# Patient Record
Sex: FEMALE | Race: WHITE | NOT HISPANIC OR LATINO | Employment: OTHER | URBAN - METROPOLITAN AREA
[De-identification: names, ages, dates, MRNs, and addresses within clinical notes are randomized per-mention and may not be internally consistent; named-entity substitution may affect disease eponyms.]

---

## 2017-01-05 ENCOUNTER — OFFICE VISIT (OUTPATIENT)
Dept: INTERNAL MEDICINE | Facility: CLINIC | Age: 67
End: 2017-01-05
Payer: MEDICARE

## 2017-01-05 ENCOUNTER — TELEPHONE (OUTPATIENT)
Dept: INTERNAL MEDICINE | Facility: CLINIC | Age: 67
End: 2017-01-05

## 2017-01-05 VITALS
WEIGHT: 175.25 LBS | BODY MASS INDEX: 29.92 KG/M2 | HEART RATE: 70 BPM | SYSTOLIC BLOOD PRESSURE: 120 MMHG | HEIGHT: 64 IN | DIASTOLIC BLOOD PRESSURE: 66 MMHG

## 2017-01-05 DIAGNOSIS — Z23 NEED FOR VACCINATION WITH 13-POLYVALENT PNEUMOCOCCAL CONJUGATE VACCINE: ICD-10-CM

## 2017-01-05 DIAGNOSIS — R42 LOSS OF EQUILIBRIUM: ICD-10-CM

## 2017-01-05 DIAGNOSIS — M81.0 OSTEOPOROSIS: ICD-10-CM

## 2017-01-05 DIAGNOSIS — E66.9 OBESITY (BMI 30.0-34.9): ICD-10-CM

## 2017-01-05 DIAGNOSIS — Z12.31 ENCOUNTER FOR SCREENING MAMMOGRAM FOR BREAST CANCER: ICD-10-CM

## 2017-01-05 DIAGNOSIS — Z71.84 TRAVEL ADVICE ENCOUNTER: ICD-10-CM

## 2017-01-05 DIAGNOSIS — E11.9 TYPE 2 DIABETES MELLITUS WITHOUT RETINOPATHY: Primary | ICD-10-CM

## 2017-01-05 DIAGNOSIS — Z23 NEED FOR TDAP VACCINATION: ICD-10-CM

## 2017-01-05 PROCEDURE — 99213 OFFICE O/P EST LOW 20 MIN: CPT | Mod: PBBFAC | Performed by: INTERNAL MEDICINE

## 2017-01-05 PROCEDURE — 99204 OFFICE O/P NEW MOD 45 MIN: CPT | Mod: S$PBB,,, | Performed by: INTERNAL MEDICINE

## 2017-01-05 PROCEDURE — 90670 PCV13 VACCINE IM: CPT | Mod: PBBFAC | Performed by: INTERNAL MEDICINE

## 2017-01-05 PROCEDURE — 99999 PR PBB SHADOW E&M-EST. PATIENT-LVL III: CPT | Mod: PBBFAC,,, | Performed by: INTERNAL MEDICINE

## 2017-01-05 NOTE — MR AVS SNAPSHOT
[facility]  [address]  [facility] [address]  Phone: [phone]  Fax: [phone]                  Linda Fong   2017 10:40 AM   Office Visit    Description:  Female : 1950   Provider:  Manuel Yanes MD   Department:  [facility]           Reason for Visit     Establish Care           Diagnoses this Visit        Comments    Need for Tdap vaccination    -  Primary     Need for vaccination with 13-polyvalent pneumococcal conjugate vaccine         Osteoporosis         Encounter for screening mammogram for breast cancer         Obesity (BMI 30.0-34.9)         Type 2 diabetes mellitus without retinopathy                To Do List           Future Appointments        Provider Department Dept Phone    2/10/2017 2:00 PM LOVE Bautista,ANP-[facility] [phone]      Goals (5 Years of Data)     None      Follow-Up and Disposition     Return in about 6 months (around 2017).       These Medications        Disp Refills Start End    diphth,pertus,acell,,tetanus (BOOSTRIX) 2.5-8-5 Lf-mcg-Lf/0.5mL Susp 0.5 mL 0 2017    Inject 0.5 mLs into the muscle once. - Intramuscular    Pharmacy: [facility] - [address] #: [phone]         [facility] On Call     [facility] On Call Nurse Care Line - / Assistance  Registered nurses in the [facility] On Call Center provide clinical advisement, health education, appointment booking, and other advisory services.  Call for this free service at [phone].             Medications           Message regarding Medications     Verify the changes and/or additions to your medication regime listed below are the same as discussed with your clinician today.  If any of these changes or additions are incorrect, please notify your healthcare provider.        START taking these NEW medications        Refills    diphth,pertus,acell,,tetanus  "(BOOSTRIX) 2.5-8-5 Lf-mcg-Lf/0.5mL Susp 0    Sig: Inject 0.5 mLs into the muscle once.    Class: Print    Route: Intramuscular      STOP taking these medications     labetalol (NORMODYNE) 100 MG tablet TAKE 1 TABLET DAILY           Verify that the below list of medications is an accurate representation of the medications you are currently taking.  If none reported, the list may be blank. If incorrect, please contact your healthcare provider. Carry this list with you in case of emergency.           Current Medications     aspirin (ECOTRIN) 81 MG EC tablet Take 81 mg by mouth once daily.      atorvastatin (LIPITOR) 20 MG tablet TAKE 1 TABLET DAILY    BD INSULIN PEN NEEDLE UF MINI 31 gauge x 3/16" Ndle USE FOUR TIMES A DAY WITH MEALS AND NIGHTLY    blood sugar diagnostic Strp 1 strip by Misc.(Non-Drug; Combo Route) route 4 (four) times daily before meals and nightly. 2-3 times daily    insulin glargine (LANTUS SOLOSTAR) 100 unit/mL (3 mL) InPn pen Inject 34 Units into the skin every evening.    losartan-hydrochlorothiazide 50-12.5 mg (HYZAAR) 50-12.5 mg per tablet TAKE 1 TABLET DAILY    metformin (GLUCOPHAGE) 1000 MG tablet TAKE 1 TABLET TWICE A DAY WITH MEALS    multivitamin capsule Take 1 capsule by mouth once daily.      NOVOLOG FLEXPEN 100 unit/mL InPn pen 10 units with meals    VITAMIN D2 50,000 unit capsule TAKE 1 CAPSULE ONCE A MONTH    ciclopirox (PENLAC) 8 % Soln Apply to toenails daily and remove every 3 days with alcohol and filing.    diclofenac sodium 1 % Gel Apply 2 g topically once daily.    diphth,pertus,acell,,tetanus (BOOSTRIX) 2.5-8-5 Lf-mcg-Lf/0.5mL Susp Inject 0.5 mLs into the muscle once.           Clinical Reference Information           Vital Signs - Last Recorded  Most recent update: 1/5/2017 10:50 AM by Libertad Hernandez MA    BP Pulse Ht Wt BMI    120/66 (BP Location: Right arm, Patient Position: Sitting, BP Method: Manual) 70 5' 4" (1.626 m) 79.5 kg (175 lb 4.3 oz) 30.08 kg/m2      Blood " Pressure          Most Recent Value    BP  120/66      Allergies as of 1/5/2017     Penicillins      Immunizations Administered on Date of Encounter - 1/5/2017     Name Date Dose VIS Date Route    Pneumococcal Conjugate - 13 Valent  Incomplete 0.5 mL 11/5/2015 Intramuscular      Orders Placed During Today's Visit      Normal Orders This Visit    Ambulatory Referral to [facility]     Pneumococcal Conjugate Vaccine (13 Valent) (IM)     Future Labs/Procedures Expected by Expires    DXA Bone Density Spine And Hip_Axial Skeleton  1/5/2017 4/5/2017    Mammo Digital Screening Bilat with CAD  1/5/2017 (Approximate) 3/6/2018

## 2017-01-05 NOTE — TELEPHONE ENCOUNTER
..Patient referred by Dr. Yanes for Health coaching (weight loss, lifestyle changes).  Called patient and gave an explanation about Health Coaching program and invited participation.   Patient states she would like to participate.  Set appointment for 1/25/17 at 1000.   Gave direct contact number to call if he/ she has any questions [phone].   Mago Jenkins RN  Health

## 2017-01-05 NOTE — TELEPHONE ENCOUNTER
----- Message from Manuel Yanes MD sent at 1/5/2017 11:54 AM CST -----  Regarding: Please contact for health coaching  Evan Mercedes,    Please contact this patient to set up an appointment to meet for Health Coaching. Their major goal is weight loss; her  Juan Jose would like to meet with you as well. Please let me know if there are other questions. Thanks!    Manuel

## 2017-01-05 NOTE — PROGRESS NOTES
"Subjective:       Patient ID: Linda Fong is a 66 y.o. female.    Chief Complaint: Establish Care    HPI    Patient is accompanied by her .    Seen in past by Endocrinology and Optometry. Seen in past by Neurology for history of TBI. Upcoming appointment with Endocrinology.    DM is well controlled with metformin and insulin. occasionally feels off balance but no traumatic falls.    Review of Systems   All other systems reviewed and are negative.     Objective:      Physical Exam   Constitutional: She is oriented to person, place, and time. No distress.    man whose Body mass index is 30.08 kg/(m^2).    HENT:   Head: Atraumatic.   Right Ear: Tympanic membrane normal.   Left Ear: Tympanic membrane normal.   Mouth/Throat: Oropharynx is clear and moist. No oropharyngeal exudate.   Eyes: Pupils are equal, round, and reactive to light. Right eye exhibits no discharge. Left eye exhibits no discharge.   Neck: No thyromegaly present.   Cardiovascular: Normal rate, regular rhythm and normal heart sounds.    Pulmonary/Chest: Effort normal and breath sounds normal. She has no wheezes. She has no rales.   Abdominal: Soft. She exhibits no distension and no mass. There is no hepatosplenomegaly. There is no tenderness. There is no guarding and negative Raygoza's sign.   Musculoskeletal: She exhibits no edema or tenderness.   Lymphadenopathy:     She has no cervical adenopathy.   Neurological: She is alert and oriented to person, place, and time.   Skin: Skin is warm and dry. No rash noted.   Psychiatric: She has a normal mood and affect. Her behavior is normal.   Nursing note and vitals reviewed.      Vitals:    01/05/17 1048   BP: 120/66   BP Location: Right arm   Patient Position: Sitting   BP Method: Manual   Pulse: 70   Weight: 79.5 kg (175 lb 4.3 oz)   Height: 5' 4" (1.626 m)     Body mass index is 30.08 kg/(m^2).    RESULTS: Reviewed labs from last 6 months    Assessment:       1. Type 2 diabetes mellitus " without retinopathy    2. Osteoporosis    3. Loss of equilibrium    4. Need for Tdap vaccination    5. Need for vaccination with 13-polyvalent pneumococcal conjugate vaccine    6. Encounter for screening mammogram for breast cancer    7. Obesity (BMI 30.0-34.9)    8. Travel advice encounter        Plan:   Linda was seen today for establish care.    Diagnoses and all orders for this visit:    Type 2 diabetes mellitus without retinopathy:  Prior diagnosis, not well controlled on current management. continue follow up with Endocrinology. Increase exercise, meet with Med fitness.  -     Ambulatory Referral to Medical Fitness    Osteoporosis:  Increase exercise. Continue Vit D supplement. Recheck DXA.  -     DXA Bone Density Spine And Hip_Axial Skeleton; Future    Loss of equilibrium:  Discussed PT for balance training, both she and  defer at this at this time because of expense concerns.  If falls begin to occur will reevaluate.    Need for Tdap vaccination  -     diphth,pertus,acell,,tetanus (BOOSTRIX) 2.5-8-5 Lf-mcg-Lf/0.5mL Susp; Inject 0.5 mLs into the muscle once.    Need for vaccination with 13-polyvalent pneumococcal conjugate vaccine  -     Pneumococcal Conjugate Vaccine (13 Valent) (IM)    Encounter for screening mammogram for breast cancer  -     Mammo Digital Screening Bilat with CAD; Future    Obesity (BMI 30.0-34.9):  Also refer to Health .  -     Ambulatory Referral to Medical [facility]    Travel advice encounter:  Will review vaccinations for Darrick and Vivek and [place].    Return in about 6 months (around 7/5/2017).     Manuel Yanes MD  Internal Medicine    Portions of this note were completed using Access[employer] dictation software. Please excuse typographical or syntax errors.

## 2017-01-09 ENCOUNTER — PATIENT MESSAGE (OUTPATIENT)
Dept: INTERNAL MEDICINE | Facility: CLINIC | Age: 67
End: 2017-01-09

## 2017-01-16 ENCOUNTER — CLINICAL SUPPORT (OUTPATIENT)
Dept: INFECTIOUS DISEASES | Facility: CLINIC | Age: 67
End: 2017-01-16
Payer: MEDICARE

## 2017-01-16 ENCOUNTER — TELEPHONE (OUTPATIENT)
Dept: INTERNAL MEDICINE | Facility: CLINIC | Age: 67
End: 2017-01-16

## 2017-01-16 DIAGNOSIS — Z23 NEED FOR HEPATITIS VACCINATION: Primary | ICD-10-CM

## 2017-01-16 PROCEDURE — 90636 HEP A/HEP B VACC ADULT IM: CPT | Mod: PBBFAC

## 2017-01-16 PROCEDURE — 99999 PR PBB SHADOW E&M-EST. PATIENT-LVL I: CPT | Mod: PBBFAC,,,

## 2017-01-16 PROCEDURE — 99211 OFF/OP EST MAY X REQ PHY/QHP: CPT | Mod: PBBFAC

## 2017-01-16 NOTE — TELEPHONE ENCOUNTER
----- Message from Nissa Mejia sent at 1/14/2017 11:17 AM CST -----  Contact: patient [phone]  Marvin Osorio is calling in response to 's email message about vaccines. She said that you will know what she is referring to. Please call pt.

## 2017-01-16 NOTE — TELEPHONE ENCOUNTER
She wants to get the vaccine that you mentioned except the rabies vaccine. She will need to be scheduled in the I.D. Dept. Please place the orders.

## 2017-01-18 ENCOUNTER — TELEPHONE (OUTPATIENT)
Dept: INTERNAL MEDICINE | Facility: CLINIC | Age: 67
End: 2017-01-18

## 2017-01-18 ENCOUNTER — PATIENT MESSAGE (OUTPATIENT)
Dept: INTERNAL MEDICINE | Facility: CLINIC | Age: 67
End: 2017-01-18

## 2017-01-18 NOTE — TELEPHONE ENCOUNTER
----- Message from Danny Oliver sent at 1/17/2017  2:02 PM CST -----  Contact: Self [phone]  The above called and stated, can your assistant please read the reply to a  message you send me, please advice    Thanks

## 2017-01-25 ENCOUNTER — CLINICAL SUPPORT (OUTPATIENT)
Dept: INTERNAL MEDICINE | Facility: CLINIC | Age: 67
End: 2017-01-25
Payer: MEDICARE

## 2017-01-25 VITALS — BODY MASS INDEX: 30.16 KG/M2 | WEIGHT: 175.69 LBS

## 2017-01-25 NOTE — PROGRESS NOTES
Date:  1/25/17                    Time: 1000  Initial Health  Contact - [x]Clinic visit  []  Phone Visit  ASSEMENT: Information obtained through combination of chart review prior to seeing patient, patient self-report.   Primary Referral diagnosis/reason for referral:    weight loss     Other:    (See physician progress note for complete list of diagnoses.)  PCP:    Dr Yanes    Referent :  [x] PCP       [] Transition Navigator    []  E.DEstrella    []  APRN  []  Other:     Supports:   Juan Jose       Preferred Learning Style  (may be a combination)  [x]  Visual      [] Auditory     [x]  Reading    [x]  Hands-on    []  1:1    []  Group        []  Alone       []  No preference   []  Combination  []  Other:         Presenting issues from patients point of view:  []  Improve nutrition/increase healthy choices.                    [x]  Improve /maintain current functioning.  []  Obtain and maintain healthy blood pressure.                  []  Stop smoking.  [x]  Improved weight management.                                       []  Lower cholesterol.  [x]  Improved blood glucose management.                            []  Improve breathing.  [x]  Increase energy level.                                                      []  Increase/maintain independence.  []  Improve sleep.                                                                []  Decrease stress.  []  Improve pain management.                                             []  Improve mood.  []  Other:                  Pt. Education Level:   14         Disease specific education services  attended:    Diabetes Education class       Patient Employed:  []yes    [x] No  Days and Hours:                Current Self-help Behaviors  []  Checks glucose level        times a day.  [x]  Tries to modify meals so more healthy.  [x]  Exercises by working around the house        []  Takes BP        times a       (insert frequency)  []  Weighs self         (how often)  [x]  Takes all  medications as prescribed.  []  Rarely misses health care appointments.  [x]  Sleeps 8 hours without waking more than twice.  [x]  Consistently wears/uses functioning aids as recommended  (ie glasses, hearing  aid, prosthesis,  walker,  wheelchair etc.)  []  Regularly engages in stress management activities      I.e.:  []  Quit smoking   []  Purposefully educates self about health issues.  []  Pt did bring glucose log with him/her.  []  Other  (explain)           []  Comments:       []  Reported Blood Sugar Readings:          Health screenings   Last PCP visit:   17   GYN/Pap:   12                       MM/5/12                                   DEXA: scheduled               CMP:           Colon: 08   HgA1C: 10/12/16   Urine Microalbumin-Creatinine:  14           Eye Exam:   5/3/16   Foot Exam:  16   Lipids: 16    Strengths:  [x]  Confident     []  Creative    []  Determined/persistent    []  Enthusiastic         [x]  Flexible  []  Good problem solving         [x]  Good self-control            []  Hard working        [x]  Hopeful/optimistic               []  Intelligent                         []  Pos support network  []  Self-aware        []  Sense of humor                                []  Open/willing       [x]  Spiritual            [x] Values health    []  Successful overcoming difficult challenges in the past.                  []   Health literate (The degree to which individuals have the capacity to obtain, process, and    understand basic health information and services needed to make appropriate health decisions.- Dept. of Health and Human services.)  []   Other Comments:             Change Markers  Scale 1-10 with 10 representing most important, most confident.     [] NA at this time.   Readiness:  10   ;  Importance: 10    ;  Confidence:  8      INTERVENTIONS:  [x] Given an explanation about health coaching program and invited participation.  [x]  Listened reflectively; provided support, encouragement, and validation; respected  and maintained patient self-direction; explored pros/cons of change; personalized health risks of maintaining the status quo; and facilitated recognition of discrepancy between current behaviors and patients goals and values.  [x] Assessed stage of change and employed appropriate motivational interviewing strategies to facilitate movement toward the next stage of change and healthy behavior modification.  [x] Normalized occurrence of relapse, helped patient recognize outcome of new self-learning as a result of the relapse such as triggers, and helped focus on factors to reduce chances of returning to previous behaviors .  [x] Used readiness scale ratings to guide assessment of importance and confidence of successfully reaching goals.  [x] Assisted patient to develop goal, action plan, and address potential barriers to goal     achievement.  [] Patient was given a new (insert glucose meter or other supplies), taught to use, and      successfully demonstrated ability to carry out essential steps of process.    [] Rx for ( monitor/supplies, pen needles, etc.) was obtained.  [x] Provided educational review, written materials/handouts (Meal Planning Counting Carbs, Healthy Plate, 10 Rules for Eating Safely for Life, 10 Tips to Cutting Your Cravings).   [x] Topics discussed/provided:    GI of foods and how it affects your metabolism and helps you burn fat, carb counting    [x] Facilitated completion of needed exams and screenings by reviewing Diabetic/Health Maintenance Report Card with patient.  [x] Provided pt with my business card.  [x] Informed of/reviewed how to access health  and after hours assistance.  [x] Discussed, planned, and scheduled future contacts.  [x]Challenges/Barriers identified discussed as identified by patient.  [x] Needs identified by this Health :    Education and support     [] Other:            For  additional care management support patient was referred to:        [x]  Community resources for they are going to [place] for medical evaluation                       []  Medication assistance programs               []  Dietician              []  Diabetes  [employer]                                        []  Mental health services                           []                  []  Diabetes [place]                                              []  Home health services                                []  Complex case management              []  PCP/covering provider due to                 []  Emergency Dept.                                  []  GYN              []  Optometrist/ Ophthalmologist                          []  Podiatrist                                                      [x]  N/A        []  Other:             RESPONSE/IMPRESSION  Behavior is consistent with the following stage of change:  []  Pre-contemplation  []  Contemplation  [x]  Preparation  []  Action  []  Maintenance  []  Relapse    Level of participation:  []  Refused to participate  []  Guarded / resistant  []  Passive participant  [x]  Active participant/interactive  [x]  Interested/receptive  [x]  Self-motivated  []  Other          Goal developed with patient today:    My ultimate goal is to be more energetic, agile and healthy  Follow glycemic load- lose weight  Exercise with weights- gain strength  Follow/ monitor blood sugar more closely    Plan (needed actions to accomplish goal):          [x] Pt will work on action plan as stated above.        [x] Pt will follow up with Health  on 2/17/17 @  730      [x] Health  will remain available.  [x] Health  will maintain regular contacts with patient to educate, support, encourage; help problem-solve; assist with development of self-advocacy/increasing independent health management; and provide resources as needed.  [] Pt has declined Health  Program at this time.  Provided pt with Health  Program information should they decide to participate at a later time.        [] Pt to facilitate contact with Health        [] Health  to facilitate contact with patient.        [] Other:          Notes:   Met with patient and her , she is motivated to lose weight her main reasons are to be healthier, decrease bulk in abdomen to increase balance and to have more energy.   She is insulin dependent diabetic. Her goal is to get to around 130 lbs where she felt good at.   Goals set with patient and will work with patient to assist to reach her goals.     Best Method of Contact:  [] email:   [x] Phone:   [] Mail  [] Office     Mago Jenkins RN

## 2017-02-10 ENCOUNTER — HOSPITAL ENCOUNTER (OUTPATIENT)
Dept: RADIOLOGY | Facility: HOSPITAL | Age: 67
Discharge: HOME | End: 2017-02-10
Attending: INTERNAL MEDICINE
Payer: MEDICARE

## 2017-02-10 ENCOUNTER — HOSPITAL ENCOUNTER (OUTPATIENT)
Dept: RADIOLOGY | Facility: CLINIC | Age: 67
Discharge: HOME | End: 2017-02-10
Attending: INTERNAL MEDICINE
Payer: MEDICARE

## 2017-02-10 ENCOUNTER — OFFICE VISIT (OUTPATIENT)
Dept: ENDOCRINOLOGY | Facility: CLINIC | Age: 67
End: 2017-02-10
Payer: MEDICARE

## 2017-02-10 VITALS
SYSTOLIC BLOOD PRESSURE: 113 MMHG | HEART RATE: 82 BPM | WEIGHT: 177 LBS | DIASTOLIC BLOOD PRESSURE: 74 MMHG | HEIGHT: 64 IN | BODY MASS INDEX: 30.22 KG/M2

## 2017-02-10 DIAGNOSIS — M81.0 OSTEOPOROSIS: ICD-10-CM

## 2017-02-10 DIAGNOSIS — I15.2 HYPERTENSION ASSOCIATED WITH DIABETES: ICD-10-CM

## 2017-02-10 DIAGNOSIS — E11.69 HYPERLIPIDEMIA ASSOCIATED WITH TYPE 2 DIABETES MELLITUS: ICD-10-CM

## 2017-02-10 DIAGNOSIS — Z79.4 TYPE 2 DIABETES MELLITUS WITH DIABETIC POLYNEUROPATHY, WITH LONG-TERM CURRENT USE OF INSULIN: Primary | ICD-10-CM

## 2017-02-10 DIAGNOSIS — Z12.31 ENCOUNTER FOR SCREENING MAMMOGRAM FOR BREAST CANCER: ICD-10-CM

## 2017-02-10 DIAGNOSIS — E55.9 VITAMIN D DEFICIENCY DISEASE: ICD-10-CM

## 2017-02-10 DIAGNOSIS — E11.59 HYPERTENSION ASSOCIATED WITH DIABETES: ICD-10-CM

## 2017-02-10 DIAGNOSIS — E66.9 OBESITY (BMI 30.0-34.9): ICD-10-CM

## 2017-02-10 DIAGNOSIS — E11.42 TYPE 2 DIABETES MELLITUS WITH DIABETIC POLYNEUROPATHY, WITH LONG-TERM CURRENT USE OF INSULIN: Primary | ICD-10-CM

## 2017-02-10 DIAGNOSIS — E78.5 HYPERLIPIDEMIA ASSOCIATED WITH TYPE 2 DIABETES MELLITUS: ICD-10-CM

## 2017-02-10 LAB
CREAT UR-MCNC: 65 MG/DL
MICROALBUMIN UR DL<=1MG/L-MCNC: 13 UG/ML
MICROALBUMIN/CREATININE RATIO: 20 UG/MG

## 2017-02-10 PROCEDURE — 99214 OFFICE O/P EST MOD 30 MIN: CPT | Mod: S$PBB,,, | Performed by: NURSE PRACTITIONER

## 2017-02-10 PROCEDURE — 99999 PR PBB SHADOW E&M-EST. PATIENT-LVL III: CPT | Mod: PBBFAC,,, | Performed by: NURSE PRACTITIONER

## 2017-02-10 PROCEDURE — 99213 OFFICE O/P EST LOW 20 MIN: CPT | Mod: PBBFAC | Performed by: NURSE PRACTITIONER

## 2017-02-10 PROCEDURE — 77080 DXA BONE DENSITY AXIAL: CPT | Mod: 26,,, | Performed by: INTERNAL MEDICINE

## 2017-02-10 PROCEDURE — 77067 SCR MAMMO BI INCL CAD: CPT | Mod: 26,,, | Performed by: RADIOLOGY

## 2017-02-10 PROCEDURE — 82570 ASSAY OF URINE CREATININE: CPT

## 2017-02-10 RX ORDER — ATORVASTATIN CALCIUM 20 MG/1
20 TABLET, FILM COATED ORAL DAILY
Qty: 90 TABLET | Refills: 2
Start: 2017-02-10 | End: 2017-03-25 | Stop reason: SDUPTHER

## 2017-02-10 NOTE — PROGRESS NOTES
"CC: Patient is here for management of Type 2 diabetes and review of medical conditions as listed in visit diagnosis.      HPI: Ms. Linda Fong is a 66 y.o. White female who was diagnosed with Type 2 in 1993. She has never been hospitalized r/t DM. She suffered a ICH after a fall years ago and has had decreased memory since then.     At her last visit with me in June 2016, her Lantus dose was increased. We discussed a V-Go, but she had no interest in this. Today she expresses interest in trying new medications as she is frustrated with her weight gain with insulin.     Monitors BG 1-2x/day. She brought logs from home today for review.   FBG- 120-160, 73, 89, 90  RANJANA- occasional checks- variable 72,106,185  DIN-rarely checks 144,183  Bedtime-119,113, 180-190s.     No hypoglycemia. Lowest BG was 73. S/s with BG in the 60's.     Denies missed doses of medications or insulin.     Eats 3 meals per day Snacks between meals and bedtime in the morning and afternoon (fruit or popcorn).     CURRENT DIABETIC MEDS: Lantus 34 units nightly, Novolog 10 units AC, Metformin 1,000 mg bid  Uses Vials or Pens: Pens  Type of Glucose Meter: Accu-Chek Compact    Last Eye Exam: May 2016  Last Podiatry Exam: January 2015    REVIEW OF SYSTEMS  General: no weakness or fatigue.   Eyes: no visual disturbances.   Cardiac: no chest pain.   Respiratory: no cough or dyspnea.   GI: no abdominal pain or nausea.   Skin: no rashes or itching.   Neuro: no numbness or tingling.   Endocrine: no polyuria, polydipsia, polyphagia.   Psych: sleeping well, no anxiety or depression.   Denies personal history of pancreatitis or pancreatic cancer. Denies family hx of thyroid cancer.     Vital Signs  Visit Vitals    /74    Pulse 82    Ht 5' 4" (1.626 m)    Wt 80.3 kg (177 lb)    BMI 30.38 kg/m2       Hemoglobin A1C   Date Value Ref Range Status   10/12/2016 8.6 (H) 4.5 - 6.2 % Final     Comment:     According to ADA guidelines, hemoglobin A1C <7.0% " represents  optimal control in non-pregnant diabetic patients.  Different  metrics may apply to specific populations.   Standards of Medical Care in Diabetes - 2016.  For the purpose of screening for the presence of diabetes:  <5.7%     Consistent with the absence of diabetes  5.7-6.4%  Consistent with increasing risk for diabetes   (prediabetes)  >or=6.5%  Consistent with diabetes  Currently no consensus exists for use of hemoglobin A1C  for diagnosis of diabetes for children.     05/30/2016 8.3 (H) 4.5 - 6.2 % Final   02/16/2016 9.2 (H) 4.5 - 6.2 % Final       Chemistry        Component Value Date/Time     10/12/2016 0931    K 4.8 10/12/2016 0931     10/12/2016 0931    CO2 31 (H) 10/12/2016 0931    BUN 14 10/12/2016 0931    CREATININE 0.7 10/12/2016 0931     (H) 10/12/2016 0931        Component Value Date/Time    CALCIUM 9.3 10/12/2016 0931    ALKPHOS 77 10/12/2016 0931    AST 21 10/12/2016 0931    ALT 23 10/12/2016 0931    BILITOT 0.9 10/12/2016 0931          Lab Results   Component Value Date    CHOL 151 02/16/2016    CHOL 152 03/17/2015    CHOL 114 (L) 12/03/2013     Lab Results   Component Value Date    HDL 52 02/16/2016    HDL 52 03/17/2015    HDL 34 (L) 12/03/2013     Lab Results   Component Value Date    LDLCALC 69.0 02/16/2016    LDLCALC 74.4 03/17/2015    LDLCALC 50.4 (L) 12/03/2013     Lab Results   Component Value Date    TRIG 150 02/16/2016    TRIG 128 03/17/2015    TRIG 148 12/03/2013     Lab Results   Component Value Date    CHOLHDL 34.4 02/16/2016    CHOLHDL 34.2 03/17/2015    CHOLHDL 29.8 12/03/2013     Lab Results   Component Value Date    TSH 2.286 02/16/2016     Lab Results   Component Value Date    MICALBCREAT 20.0 11/21/2014     Component      Latest Ref Rng 2/16/2016   Vit D, 1,25-Dihydroxy      20-79 pg/mL 35     PHYSICAL EXAMINATION  Constitutional: Appears well, no distress  Neck: Supple, trachea midline.   Respiratory: CTA without wheezes, even and  unlabored.  Cardiovascular: RRR, S1 and S2 with no murmurs or carotid bruits.  Lymph: DP pulses  2+ bilaterally; no edema.   Skin: warm and dry; no insulin site reactions observed.  Neuro: patient alert and cooperative.  Feet: no open wounds. Appropriate footwear.       Assessment/Plan    1. Diabetic peripheral neuropathy associated with type 2 diabetes mellitus   -A1c today.  -Discussed diagnosis of DM, progression of disease, long term complications and tx options such as GLP1 and SGLT2.  -Reviewed A1c and BG goals. Reviewed hypoglycemia, s/s and appropriate tx options. Instructed to monitor BG as directed, bring logs/ meter to clinic visits.     -Continue Metformin 1000 mg BID.     -Start Trulicity 0.75 mg weekly. Discussed MOA, side effects, including n/v/pancreatitis/medullary thyroid cancer. Instructed patient to notify me of any n/v/abdominal pain. Discussed proper dosing and administration. Discussed with patient that GLP-1 agonists and prandial insulin have not been studied together. Patient aware that the combination may increase the risk of hypoglycemia and agrees to proceed with current treatment regimen. Send BG Logs in 2-3 weeks after starting Trulicity for possible insulin dose changes.     -For now, Continue Lantus 34 units qhs and Novolog 10 units AC.     -Monitor BG 4x/day. Bring logs to next visit.     -Urine MAC today.      -On ASA/Statin/ARB     2. Essential hypertension: BP at goal. Continue Hyzaar.      3. Hyperlipidemia:LDL at goal. LFTs WNL.  continue Atorvastatin 20 mg daily.      4 Vitamin D deficiency - Continue supplement. Check Vitamin D level with RTC.   5 Obesity - Body mass index is 30.38 kg/(m^2). Discussed diet and exercise. Increases insulin resistance.        FOLLOW UP  Return in about 3 months (around 5/10/2017).     Orders Placed This Encounter   Procedures    Hemoglobin A1c     Standing Status:   Future     Standing Expiration Date:   4/11/2018    Hemoglobin A1c     Standing  Status:   Future     Standing Expiration Date:   4/11/2018    Vitamin D     Standing Status:   Future     Standing Expiration Date:   4/11/2018    TSH     Standing Status:   Future     Standing Expiration Date:   4/11/2018    Microalbumin/creatinine urine ratio     Order Specific Question:   Specimen Source     Answer:   Urine    Lipid panel     Standing Status:   Future     Standing Expiration Date:   4/11/2018     Pt seen in conjunction with JEANNETTE Rueda

## 2017-02-10 NOTE — MR AVS SNAPSHOT
[facility]  [address] [facility] [address]  Phone: [phone]                  Linda Fong   2/10/2017 2:00 PM   Office Visit    Description:  Female : 1950   Provider:  LOVE Bautista,ANP-C   Department:  [facility] - Endocrinology           Reason for Visit     Diabetes Mellitus           Diagnoses this Visit        Comments    Type 2 diabetes mellitus with diabetic polyneuropathy, with long-term current use of insulin    -  Primary     Hypertension associated with diabetes         Hyperlipidemia associated with type 2 diabetes mellitus         Vitamin D deficiency disease         Obesity (BMI 30.0-34.9)                To Do List           Future Appointments        Provider Department Dept Phone    2017 7:30 AM [facility] - Internal Medicine [phone]    2017 9:00 AM INJECTION, INFECTIOUS DISEASES [facility] ID Injection Room [phone]    5/15/2017 7:10 AM LAB, APPOINTMENT [place] [facility] [phone]    2017 11:00 AM LOVE Bautista,ANP-C [facility] Endocrinology [phone]    2017 8:40 AM INJECTION, INFECTIOUS DISEASES [facility] Injection Room [phone]      Goals (5 Years of Data)     None      Follow-Up and Disposition     Return in about 3 months (around 5/10/2017).       These Medications        Disp Refills Start End    atorvastatin (LIPITOR) 20 MG tablet 90 tablet 2 2/10/2017     Take 1 tablet (20 mg total) by mouth once daily. - Oral    Pharmacy: [facility] - [address] Ph #: [phone]       dulaglutide (TRULICITY) 0.75 mg/0.5 mL PnIj 12 Syringe 2 2/10/2017     Inject 0.75 mg into the skin once a week. - Subcutaneous    Pharmacy: [facility] - [address] Ph #: [phone]         [facility]     [facility] Nurse Care Line -  Assistance  Registered  "nurses in the [facility] provide clinical advisement, health education, appointment booking, and other advisory services.  Call for this free service at [phone].             Medications           Message regarding Medications     Verify the changes and/or additions to your medication regime listed below are the same as discussed with your clinician today.  If any of these changes or additions are incorrect, please notify your healthcare provider.        START taking these NEW medications        Refills    dulaglutide (TRULICITY) 0.75 mg/0.5 mL PnIj 2    Sig: Inject 0.75 mg into the skin once a week.    Class: Normal    Route: Subcutaneous      CHANGE how you are taking these medications     Start Taking Instead of    atorvastatin (LIPITOR) 20 MG tablet atorvastatin (LIPITOR) 20 MG tablet    Dosage:  Take 1 tablet (20 mg total) by mouth once daily. Dosage:  TAKE 1 TABLET DAILY           Verify that the below list of medications is an accurate representation of the medications you are currently taking.  If none reported, the list may be blank. If incorrect, please contact your healthcare provider. Carry this list with you in case of emergency.           Current Medications     aspirin (ECOTRIN) 81 MG EC tablet Take 81 mg by mouth once daily.      atorvastatin (LIPITOR) 20 MG tablet Take 1 tablet (20 mg total) by mouth once daily.    BD INSULIN PEN NEEDLE UF MINI 31 gauge x 3/16" Ndle USE FOUR TIMES A DAY WITH MEALS AND NIGHTLY    blood sugar diagnostic Strp 1 strip by Misc.(Non-Drug; Combo Route) route 4 (four) times daily before meals and nightly. 2-3 times daily    ciclopirox (PENLAC) 8 % Soln Apply to toenails daily and remove every 3 days with alcohol and filing.    diclofenac sodium 1 % Gel Apply 2 g topically once daily.    insulin glargine (LANTUS SOLOSTAR) 100 unit/mL (3 mL) InPn pen Inject 34 Units into the skin every evening.    losartan-hydrochlorothiazide 50-12.5 mg (HYZAAR) 50-12.5 mg " "per tablet TAKE 1 TABLET DAILY    metformin (GLUCOPHAGE) 1000 MG tablet TAKE 1 TABLET TWICE A DAY WITH MEALS    multivitamin capsule Take 1 capsule by mouth once daily.      NOVOLOG FLEXPEN 100 unit/mL InPn pen 10 units with meals    VITAMIN D2 50,000 unit capsule TAKE 1 CAPSULE ONCE A MONTH    dulaglutide (TRULICITY) 0.75 mg/0.5 mL PnIj Inject 0.75 mg into the skin once a week.           Clinical Reference Information           Your Vitals Were     BP Pulse Height Weight BMI    113/74 82 5' 4" (1.626 m) 80.3 kg (177 lb) 30.38 kg/m2      Blood Pressure          Most Recent Value    BP  113/74      Allergies as of 2/10/2017     Penicillins      Immunizations Administered on Date of Encounter - 2/10/2017     None      Orders Placed During Today's Visit      Normal Orders This Visit    Microalbumin/creatinine urine ratio     Future Labs/Procedures Expected by Expires    Hemoglobin A1c  2/10/2017 4/11/2018    Hemoglobin A1c  2/10/2017 4/11/2018    Lipid panel  2/10/2017 4/11/2018    TSH  2/10/2017 4/11/2018    Vitamin D  2/10/2017 4/11/2018      Instructions      Dulaglutide injection  What is this medicine?  DULAGLUTIDE (DOO la GLOO tide) is used to improve blood sugar control in adults with type 2 diabetes. This medicine may be used with other oral diabetes medicines.  How should I use this medicine?  This medicine is for injection under the skin of your upper leg (thigh), stomach area, or upper arm. It is usually given once every week (every 7 days). You will be taught how to prepare and give this medicine. Use exactly as directed. Take your medicine at regular intervals. Do not take it more often than directed.  If you use this medicine with insulin, you should inject this medicine and the insulin separately. Do not mix them together. Do not give the injections right next to each other. Change (rotate) injection sites with each injection.  It is important that you put your used needles and syringes in a special " sharps container. Do not put them in a trash can. If you do not have a sharps container, call your pharmacist or healthcare provider to get one.  A special MedGuide will be given to you by the pharmacist with each prescription and refill. Be sure to read this information carefully each time.  Talk to your pediatrician regarding the use of this medicine in children. Special care may be needed.  What side effects may I notice from receiving this medicine?  Side effects that you should report to your doctor or health care professional as soon as possible:  · allergic reactions like skin rash, itching or hives, swelling of the face, lips, or tongue  · breathing problems  · signs and symptoms of low blood sugar such as feeling anxious, confusion, dizziness, increased hunger, unusually weak or tired, sweating, shakiness, cold, irritable, headache, blurred vision, fast heartbeat, loss of consciousness  · unusual stomach upset or pain  · vomiting  Side effects that usually do not require medical attention (Report these to your doctor or health care professional if they continue or are bothersome.):diarrhea  · heartburn  · loss of appetite  · nausea  · pain, redness, or irritation at site where injected  What may interact with this medicine?  Do not take this medicine with any of the following medications:  · gatifloxacin  Many medications may cause changes in blood sugar, these include:  · alcohol containing beverages  · aspirin and aspirin-like drugs  · chloramphenicol  · chromium  · diuretics  · female hormones, such as estrogens or progestins, birth control pills  · heart medicines  · isoniazid  · male hormones or anabolic steroids  · medications for weight loss  · medicines for allergies, asthma, cold, or cough  · medicines for mental problems  · medicines called MAO inhibitors - Nardil, Parnate, Marplan, Eldepryl  · niacin  · NSAIDS, such as ibuprofen  · pentamidine  · phenytoin  · probenecid  · quinolone antibiotics  such as ciprofloxacin, levofloxacin, ofloxacin  · some herbal dietary supplements  · steroid medicines such as prednisone or cortisone  · thyroid hormonesSome medications can hide the warning symptoms of low blood sugar (hypoglycemia). You may need to monitor your blood sugar more closely if you are taking one of these medications. These include:  · beta-blockers, often used for high blood pressure or heart problems (examples include atenolol, metoprolol, propranolol)  · clonidine  · guanethidine  · reserpine  What if I miss a dose?  If you miss a dose, take it as soon as you can within 3 days after the missed dose. Then take your next dose at your regular weekly time. If it has been longer than 3 days after the missed dose, do not take the missed dose. Take the next dose at your regular time. Do not take double or extra doses. If you have questions about a missed dose, contact your health care provider for advice.  Where should I keep my medicine?  Keep out of the reach of children.  Store this medicine in a refrigerator between 2 and 8 degrees C (36 and 46 degrees F). Do not freeze or use if the medicine has been frozen. Protect from light and excessive heat. Each single-dose pen or prefilled syringe can be kept at room temperature, not to exceed 30 degrees C (86 degrees F) for a total of 14 days, if needed. Store in the carton until use. Throw away any unused medicine after the expiration date.  What should I tell my health care provider before I take this medicine?  They need to know if you have any of these conditions:  · endocrine tumors (MEN 2) or if someone in your family had these tumors  · history of pancreatitis  · kidney disease  · liver disease  · stomach problems  · thyroid cancer or if someone in your family had thyroid cancer  · an unusual or allergic reaction to dulaglutide, other medicines, foods, dyes, or preservatives  · pregnant or trying to get pregnant  · breast-feeding  What should I watch  for while using this medicine?  Visit your doctor or health care professional for regular checks on your progress.  A test called the HbA1C (A1C) will be monitored. This is a simple blood test. It measures your blood sugar control over the last 2 to 3 months. You will receive this test every 3 to 6 months.  Learn how to check your blood sugar. Learn the symptoms of low and high blood sugar and how to manage them.  Always carry a quick-source of sugar with you in case you have symptoms of low blood sugar. Examples include hard sugar candy or glucose tablets. Make sure others know that you can choke if you eat or drink when you develop serious symptoms of low blood sugar, such as seizures or unconsciousness. They must get medical help at once.  Tell your doctor or health care professional if you have high blood sugar. You might need to change the dose of your medicine. If you are sick or exercising more than usual, you might need to change the dose of your medicine.  Do not skip meals. Ask your doctor or health care professional if you should avoid alcohol. Many nonprescription cough and cold products contain sugar or alcohol. These can affect blood sugar.  Wear a medical ID bracelet or chain, and carry a card that describes your disease and details of your medicine and dosage times.  Date Last Reviewed:   NOTE:This sheet is a summary. It may not cover all possible information. If you have questions about this medicine, talk to your doctor, pharmacist, or health care provider. Copyright© 2016 [employer]             Language Assistance Services     ATTENTION: Language assistance services are available, free of charge. Please call [phone].      ATENCIÓN: Si habla español, tiene a mckeon disposición servicios gratuitos de asistencia lingüística. Llame al [phone].     [facility] Ý: N?u b?n nói Ti?ng Vi?t, có các d?ch v? h? tr? ngôn ng? mi?n phí dành cho b?n. G?i s? [phone].         [facility]  complies with applicable Federal civil rights laws and does not discriminate on the basis of race, color, national origin, age, disability, or sex.

## 2017-02-15 ENCOUNTER — PATIENT MESSAGE (OUTPATIENT)
Dept: ENDOCRINOLOGY | Facility: CLINIC | Age: 67
End: 2017-02-15

## 2017-02-15 DIAGNOSIS — Z79.4 TYPE 2 DIABETES MELLITUS WITH DIABETIC POLYNEUROPATHY, WITH LONG-TERM CURRENT USE OF INSULIN: ICD-10-CM

## 2017-02-15 DIAGNOSIS — E11.42 TYPE 2 DIABETES MELLITUS WITH DIABETIC POLYNEUROPATHY, WITH LONG-TERM CURRENT USE OF INSULIN: ICD-10-CM

## 2017-02-16 ENCOUNTER — PATIENT MESSAGE (OUTPATIENT)
Dept: ENDOCRINOLOGY | Facility: CLINIC | Age: 67
End: 2017-02-16

## 2017-02-17 ENCOUNTER — CLINICAL SUPPORT (OUTPATIENT)
Dept: INTERNAL MEDICINE | Facility: CLINIC | Age: 67
End: 2017-02-17
Payer: MEDICARE

## 2017-02-17 ENCOUNTER — CLINICAL SUPPORT (OUTPATIENT)
Dept: INFECTIOUS DISEASES | Facility: CLINIC | Age: 67
End: 2017-02-17
Payer: MEDICARE

## 2017-02-17 VITALS — BODY MASS INDEX: 30.92 KG/M2 | WEIGHT: 180.13 LBS

## 2017-02-17 PROCEDURE — 99211 OFF/OP EST MAY X REQ PHY/QHP: CPT | Mod: PBBFAC

## 2017-02-17 PROCEDURE — 99999 PR PBB SHADOW E&M-EST. PATIENT-LVL I: CPT | Mod: PBBFAC,,,

## 2017-02-17 PROCEDURE — 90636 HEP A/HEP B VACC ADULT IM: CPT | Mod: PBBFAC

## 2017-02-17 NOTE — PROGRESS NOTES
Health  Follow-Up Note   Date: 2/17/17                  Time: 0730  [x] Office  [] Phone  Notes from previous session reviewed.   [x] Previous Session Goals unchanged.   [] Patient/Caregiver Change in Goals.  Goals added or changed by Patient/Caregiver since program participation:  1.     Started [place] going to meet with      Additional/Changed support that patient/caregiver has experienced/sought?  (Indicate readiness, support from family, friends, others, community groups, etc)  1.   and [place]    Additional/Changed obstacles that could prevent patient/caregiver from reaching their goals?  1.  Not enough exercise    Feedback provided:  1.  Praised for continued effort    Diagnostic values/Desriptors for follow-up as needed for chronic condition(s)   Weight: 81.7 kg 180 lb 1.9 oz  Blood Glucose: starting on Trulicity stopping Novalog on Saturday per FARZANEH Tomas    Interventions:   1. Health  listened reflectively, validated thoughts and feelings, offered support and encouragement.   2. Allowed patient to express themselves in a non-biased atmosphere.  3. Health  assisted pt to problem-solve obstacles such as being in a challenging environment and dealing with these challenges.   4. Motivational Interviewed interventions utilized (OARS).   5. Patient responded favorably to interventions and remained actively engaged in the session.   6. Health  will remain available and connected for patient by phone and/or office visits.   7. Positive reinforcement, emotional support and encouragement provided.   8. Focused Education: MI, processed foods, increase exercise, checking glucose after eating to see what foods making elevated    Plan:  [x] Pt will work on goals as stated above.   [x] Pt will contact Health  for any questions, concerns or needs.  [x] Pt will follow up with Health  in office on 3/16/17 at 0930.        [] Pt will follow up with Health  on phone in:        [x]  Health  will remain available.   [] Health  will contact patient by phone in:        [] Health  will consult:        [] Health  will inform Provider via EPIC messaging.     Impression:  1. Behavior is consistent with  Action     Stage of Change.   2. Participation level:       [x] Receptive      [x] Interactive      [] Guarded and Resistant      [x] Self Motivated      [] Refused/Declined to participate   3. [x] Pt voiced understanding of all information presented.       [] Pt voiced needing further information/education. This will be arranged.       [x] Pt would benefit from further education/information as identified by this health . This will be arranged.     Mago Jenkins RN HC

## 2017-03-16 ENCOUNTER — CLINICAL SUPPORT (OUTPATIENT)
Dept: INTERNAL MEDICINE | Facility: CLINIC | Age: 67
End: 2017-03-16
Payer: MEDICARE

## 2017-03-16 VITALS — BODY MASS INDEX: 30.35 KG/M2 | WEIGHT: 176.81 LBS

## 2017-03-16 NOTE — PROGRESS NOTES
Health  Follow-Up Note   [x] Office  [] Phone  Notes from previous session reviewed.   [x] Previous Session Goals unchanged.   [] Patient/Caregiver Change in Goals.  Goals added or changed by Patient/Caregiver since program participation:  1.     Started on Trulicity 1 x week last month     Additional/Changed support that patient/caregiver has experienced/sought?  (Indicate readiness, support from family, friends, others, community groups, etc)  1.  [employer] gym 3 x week    Additional/Changed obstacles that could prevent patient/caregiver from reaching their goals?  1.  sugar    Feedback provided:  1.  Praised for great job down 3 lbs    Diagnostic values/Desriptors for follow-up as needed for chronic condition(s)   Weight: 80.2 176 lb 12.9 oz  Blood Glucose:     Interventions:   1. Health  listened reflectively, validated thoughts and feelings, offered support and encouragement.   2. Allowed patient to express themselves in a non-biased atmosphere.  3. Health  assisted pt to problem-solve obstacles such as being in a challenging environment and dealing with these challenges.   4. Motivational Interviewed interventions utilized (OARS).   5. Patient responded favorably to interventions and remained actively engaged in the session.   6. Health  will remain available and connected for patient by phone and/or office visits.   7. Positive reinforcement, emotional support and encouragement provided.   8. Focused Education: MI, sugar    Plan:  [x] Pt will work on goals as stated above.   [x] Pt will contact Health  for any questions, concerns or needs.  [x] Pt will follow up with Health  in office on 4/13/17 at 0930.        [] Pt will follow up with Health  on phone in:        [x] Health  will remain available.   [] Health  will contact patient by phone in:        [] Health  will consult:        [] Health  will inform Provider via EPIC messaging.      Impression:  1. Behavior is consistent with  Action     Stage of Change.   2. Participation level:       [x] Receptive      [x] Interactive      [] Guarded and Resistant      [x] Self Motivated      [] Refused/Declined to participate   3. [x] Pt voiced understanding of all information presented.       [] Pt voiced needing further information/education. This will be arranged.       [x] Pt would benefit from further education/information as identified by this health . This will be arranged.     Mago Jenkins RN HC

## 2017-03-27 RX ORDER — ATORVASTATIN CALCIUM 20 MG/1
TABLET, FILM COATED ORAL
Qty: 90 TABLET | Refills: 3 | Status: SHIPPED | OUTPATIENT
Start: 2017-03-27 | End: 2018-09-17 | Stop reason: SDUPTHER

## 2017-03-27 RX ORDER — PEN NEEDLE, DIABETIC 31 GX5/16"
NEEDLE, DISPOSABLE MISCELLANEOUS
Qty: 400 EACH | Refills: 3 | Status: SHIPPED | OUTPATIENT
Start: 2017-03-27 | End: 2020-03-13 | Stop reason: SDUPTHER

## 2017-03-27 RX ORDER — LABETALOL 100 MG/1
TABLET, FILM COATED ORAL
Qty: 90 TABLET | Refills: 3 | Status: SHIPPED | OUTPATIENT
Start: 2017-03-27 | End: 2017-05-08

## 2017-04-07 NOTE — PATIENT INSTRUCTIONS
Dulaglutide injection  What is this medicine?  DULAGLUTIDE (DOO la GLOO tide) is used to improve blood sugar control in adults with type 2 diabetes. This medicine may be used with other oral diabetes medicines.  How should I use this medicine?  This medicine is for injection under the skin of your upper leg (thigh), stomach area, or upper arm. It is usually given once every week (every 7 days). You will be taught how to prepare and give this medicine. Use exactly as directed. Take your medicine at regular intervals. Do not take it more often than directed.  If you use this medicine with insulin, you should inject this medicine and the insulin separately. Do not mix them together. Do not give the injections right next to each other. Change (rotate) injection sites with each injection.  It is important that you put your used needles and syringes in a special sharps container. Do not put them in a trash can. If you do not have a sharps container, call your pharmacist or healthcare provider to get one.  A special MedGuide will be given to you by the pharmacist with each prescription and refill. Be sure to read this information carefully each time.  Talk to your pediatrician regarding the use of this medicine in children. Special care may be needed.  What side effects may I notice from receiving this medicine?  Side effects that you should report to your doctor or health care professional as soon as possible:  · allergic reactions like skin rash, itching or hives, swelling of the face, lips, or tongue  · breathing problems  · signs and symptoms of low blood sugar such as feeling anxious, confusion, dizziness, increased hunger, unusually weak or tired, sweating, shakiness, cold, irritable, headache, blurred vision, fast heartbeat, loss of consciousness  · unusual stomach upset or pain  · vomiting  Side effects that usually do not require medical attention (Report these to your doctor or health care professional if they  continue or are bothersome.):diarrhea  · heartburn  · loss of appetite  · nausea  · pain, redness, or irritation at site where injected  What may interact with this medicine?  Do not take this medicine with any of the following medications:  · gatifloxacin  Many medications may cause changes in blood sugar, these include:  · alcohol containing beverages  · aspirin and aspirin-like drugs  · chloramphenicol  · chromium  · diuretics  · female hormones, such as estrogens or progestins, birth control pills  · heart medicines  · isoniazid  · male hormones or anabolic steroids  · medications for weight loss  · medicines for allergies, asthma, cold, or cough  · medicines for mental problems  · medicines called MAO inhibitors - Nardil, Parnate, Marplan, Eldepryl  · niacin  · NSAIDS, such as ibuprofen  · pentamidine  · phenytoin  · probenecid  · quinolone antibiotics such as ciprofloxacin, levofloxacin, ofloxacin  · some herbal dietary supplements  · steroid medicines such as prednisone or cortisone  · thyroid hormonesSome medications can hide the warning symptoms of low blood sugar (hypoglycemia). You may need to monitor your blood sugar more closely if you are taking one of these medications. These include:  · beta-blockers, often used for high blood pressure or heart problems (examples include atenolol, metoprolol, propranolol)  · clonidine  · guanethidine  · reserpine  What if I miss a dose?  If you miss a dose, take it as soon as you can within 3 days after the missed dose. Then take your next dose at your regular weekly time. If it has been longer than 3 days after the missed dose, do not take the missed dose. Take the next dose at your regular time. Do not take double or extra doses. If you have questions about a missed dose, contact your health care provider for advice.  Where should I keep my medicine?  Keep out of the reach of children.  Store this medicine in a refrigerator between 2 and 8 degrees C (36 and 46  degrees F). Do not freeze or use if the medicine has been frozen. Protect from light and excessive heat. Each single-dose pen or prefilled syringe can be kept at room temperature, not to exceed 30 degrees C (86 degrees F) for a total of 14 days, if needed. Store in the carton until use. Throw away any unused medicine after the expiration date.  What should I tell my health care provider before I take this medicine?  They need to know if you have any of these conditions:  · endocrine tumors (MEN 2) or if someone in your family had these tumors  · history of pancreatitis  · kidney disease  · liver disease  · stomach problems  · thyroid cancer or if someone in your family had thyroid cancer  · an unusual or allergic reaction to dulaglutide, other medicines, foods, dyes, or preservatives  · pregnant or trying to get pregnant  · breast-feeding  What should I watch for while using this medicine?  Visit your doctor or health care professional for regular checks on your progress.  A test called the HbA1C (A1C) will be monitored. This is a simple blood test. It measures your blood sugar control over the last 2 to 3 months. You will receive this test every 3 to 6 months.  Learn how to check your blood sugar. Learn the symptoms of low and high blood sugar and how to manage them.  Always carry a quick-source of sugar with you in case you have symptoms of low blood sugar. Examples include hard sugar candy or glucose tablets. Make sure others know that you can choke if you eat or drink when you develop serious symptoms of low blood sugar, such as seizures or unconsciousness. They must get medical help at once.  Tell your doctor or health care professional if you have high blood sugar. You might need to change the dose of your medicine. If you are sick or exercising more than usual, you might need to change the dose of your medicine.  Do not skip meals. Ask your doctor or health care professional if you should avoid alcohol. Many  nonprescription cough and cold products contain sugar or alcohol. These can affect blood sugar.  Wear a medical ID bracelet or chain, and carry a card that describes your disease and details of your medicine and dosage times.  Date Last Reviewed:   NOTE:This sheet is a summary. It may not cover all possible information. If you have questions about this medicine, talk to your doctor, pharmacist, or health care provider. Copyright© 2016 [employer]         n/a

## 2017-04-17 ENCOUNTER — PATIENT MESSAGE (OUTPATIENT)
Dept: ENDOCRINOLOGY | Facility: CLINIC | Age: 67
End: 2017-04-17

## 2017-04-17 ENCOUNTER — TELEPHONE (OUTPATIENT)
Dept: ENDOCRINOLOGY | Facility: CLINIC | Age: 67
End: 2017-04-17

## 2017-04-17 NOTE — TELEPHONE ENCOUNTER
Pt came in lobby regards her Trulicity is cost her 250$. Pt cant josem coupon cause she have Medicare part d and [place] in [place]. Pt want a know if you want to go back on Novolog and lantus . Other brand name does cover but reason of [place]  Cost is high. Please advise.

## 2017-04-26 ENCOUNTER — TELEPHONE (OUTPATIENT)
Dept: INTERNAL MEDICINE | Facility: CLINIC | Age: 67
End: 2017-04-26

## 2017-04-26 NOTE — TELEPHONE ENCOUNTER
Pt is going to [place] at the end of next month  She has been having rt hip problems and wants to know if you would give her a handicap parking tag for her car?

## 2017-04-28 NOTE — TELEPHONE ENCOUNTER
I didn't discuss hip pain with her at our visit, and don't see any visits with Orthopedics or PT. I would like her to be seen in clinic prior to determining if a handicap sticker is indicated.

## 2017-05-08 ENCOUNTER — OFFICE VISIT (OUTPATIENT)
Dept: INTERNAL MEDICINE | Facility: CLINIC | Age: 67
End: 2017-05-08
Payer: MEDICARE

## 2017-05-08 VITALS
HEIGHT: 63 IN | HEART RATE: 64 BPM | SYSTOLIC BLOOD PRESSURE: 120 MMHG | WEIGHT: 168.88 LBS | DIASTOLIC BLOOD PRESSURE: 76 MMHG | BODY MASS INDEX: 29.92 KG/M2

## 2017-05-08 DIAGNOSIS — M70.61 TROCHANTERIC BURSITIS, RIGHT HIP: Primary | ICD-10-CM

## 2017-05-08 PROCEDURE — 99999 PR PBB SHADOW E&M-EST. PATIENT-LVL III: CPT | Mod: PBBFAC,,, | Performed by: INTERNAL MEDICINE

## 2017-05-08 PROCEDURE — 99213 OFFICE O/P EST LOW 20 MIN: CPT | Mod: S$PBB,,, | Performed by: INTERNAL MEDICINE

## 2017-05-08 PROCEDURE — 99213 OFFICE O/P EST LOW 20 MIN: CPT | Mod: PBBFAC | Performed by: INTERNAL MEDICINE

## 2017-05-08 NOTE — MR AVS SNAPSHOT
[facility] - [facility] Medicine  [address]  [facility] [address]  Phone: [phone]  Fax: [phone]                  Linda Fong   2017 11:40 AM   Office Visit    Description:  Female : 1950   Provider:  Manuel Yanes MD   Department:  [facility]           Reason for Visit     Follow-up           Diagnoses this Visit        Comments    Trochanteric bursitis, right hip    -  Primary            To Do List           Future Appointments        Provider Department Dept Phone    5/15/2017 7:10 AM LAB, APPOINTMENT [place] [facility] [phone]    2017 11:00 AM LOVE Bautista,ANP-[facility] - Endocrinology [phone]    2017 8:40 AM INJECTION, INFECTIOUS DISEASES [facility] ID Injection Room [phone]      Goals (5 Years of Data)     None      Follow-Up and Disposition     Return in about 8 weeks (around 7/3/2017).      [facility]     [facility] -  Assistance  Unless otherwise directed by your provider, please contact [facility], our nurse care line that is available for  assistance.     Registered nurses in the [facility] provide: appointment scheduling, clinical advisement, health education, and other advisory services.  Call: [phone] (toll free)               Medications           Message regarding Medications     Verify the changes and/or additions to your medication regime listed below are the same as discussed with your clinician today.  If any of these changes or additions are incorrect, please notify your healthcare provider.        STOP taking these medications     labetalol (NORMODYNE) 100 MG tablet TAKE 1 TABLET DAILY           Verify that the below list of medications is an accurate representation of the medications you are currently taking.  If none reported, the list may be blank. If incorrect, please contact your healthcare provider. Carry this list  "with you in case of emergency.           Current Medications     ACCU-CHEK COMPACT TEST Strp     aspirin (ECOTRIN) 81 MG EC tablet Take 81 mg by mouth once daily.      atorvastatin (LIPITOR) 20 MG tablet TAKE 1 TABLET DAILY    BD INSULIN PEN NEEDLE UF MINI 31 gauge x 3/16" Ndle USE FOUR TIMES A DAY WITH MEALS AND NIGHTLY    blood sugar diagnostic Strp 1 strip by Misc.(Non-Drug; Combo Route) route 4 (four) times daily before meals and nightly. 2-3 times daily    diclofenac sodium 1 % Gel Apply 2 g topically once daily.    dulaglutide (TRULICITY) 0.75 mg/0.5 mL PnIj Inject 0.75 mg into the skin once a week.    insulin glargine (LANTUS SOLOSTAR) 100 unit/mL (3 mL) InPn pen Inject 34 Units into the skin every evening.    losartan-hydrochlorothiazide 50-12.5 mg (HYZAAR) 50-12.5 mg per tablet TAKE 1 TABLET DAILY    metformin (GLUCOPHAGE) 1000 MG tablet TAKE 1 TABLET TWICE A DAY WITH MEALS    multivitamin capsule Take 1 capsule by mouth once daily.      NOVOLOG FLEXPEN 100 unit/mL InPn pen 10 units with meals    VITAMIN D2 50,000 unit capsule TAKE 1 CAPSULE ONCE A MONTH    ciclopirox (PENLAC) 8 % Soln Apply to toenails daily and remove every 3 days with alcohol and filing.           Clinical Reference Information           Your Vitals Were     BP Pulse Height Weight BMI    120/76 (BP Location: Left arm, Patient Position: Sitting, BP Method: Manual) 64 5' 3" (1.6 m) 76.6 kg (168 lb 14 oz) 29.91 kg/m2      Blood Pressure          Most Recent Value    BP  120/76      Allergies as of 5/8/2017     Penicillins      Immunizations Administered on Date of Encounter - 5/8/2017     None      Language Assistance Services     ATTENTION: Language assistance services are available, free of charge. Please call [phone].      ATENCIÓN: Si lionel gould, tiene a mckeon disposición servicios gratuitos de asistencia lingüística. Llame al [phone].     CHÚ Ý: N?u b?n nói Ti?ng Vi?t, có các d?ch v? h? tr? ngôn ng? mi?n phí dành cho b?n. " G?i s? [phone].         [facility] complies with applicable Federal civil rights laws and does not discriminate on the basis of race, color, national origin, age, disability, or sex.

## 2017-05-08 NOTE — PROGRESS NOTES
"Subjective:       Patient ID: Linda Fong is a 67 y.o. female.    Chief Complaint: Follow-up    HPI    Patient is accompanied by her .  For about 1 month patient was having right hip pain.  She reports pain when sleeping on that side and when moving the leg.  Started up after doing regular exercising at the gym.  A few days ago the pain resolved.  Did not use Tylenol or ibuprofen while the pain was going on.  She was still able to complete exercises at the gym.    Review of Systems   Constitutional: Negative for activity change and unexpected weight change.   HENT: Negative for hearing loss, rhinorrhea and trouble swallowing.    Eyes: Negative for discharge and visual disturbance.   Respiratory: Negative for chest tightness and wheezing.    Cardiovascular: Negative for chest pain and palpitations.   Gastrointestinal: Negative for blood in stool, constipation, diarrhea and vomiting.   Endocrine: Negative for polydipsia and polyuria.   Genitourinary: Negative for difficulty urinating, dysuria, hematuria and menstrual problem.   Musculoskeletal: Positive for arthralgias. Negative for joint swelling.   Neurological: Negative for weakness and headaches.   Psychiatric/Behavioral: Negative for confusion and dysphoric mood.       Objective:      Physical Exam   Constitutional: No distress.    woman whose Body mass index is 29.91 kg/(m^2).    Musculoskeletal: Normal range of motion. She exhibits no tenderness.   No tenderness to palpation over right trochanter.   Nursing note and vitals reviewed.      Vitals:    05/08/17 1201   BP: 120/76   BP Location: Left arm   Patient Position: Sitting   BP Method: Manual   Pulse: 64   Weight: 76.6 kg (168 lb 14 oz)   Height: 5' 3" (1.6 m)     Body mass index is 29.91 kg/(m^2).    Assessment:       1. Trochanteric bursitis, right hip        Plan:   Linda was seen today for follow-up.    Diagnoses and all orders for this visit:    Trochanteric bursitis, right hip:  " Improving, discussed with  at gym for modifications of exercise and stretches to help limit hip pain.  Today completed temporary handicap parking sticker for use on upcoming travel.  If pain continues, I will refer to physical therapy.  Use of ibuprofen or Aleve as needed.    Return in about 8 weeks (around 7/3/2017). Routine follow up visit.  Manuel Yanes MD  Internal Medicine    Portions of this note were completed using Dragon medical dictation software. Please excuse typographical or syntax errors.

## 2017-05-15 ENCOUNTER — LAB VISIT (OUTPATIENT)
Dept: LAB | Facility: HOSPITAL | Age: 67
End: 2017-05-15
Payer: MEDICARE

## 2017-05-15 DIAGNOSIS — E55.9 VITAMIN D DEFICIENCY DISEASE: ICD-10-CM

## 2017-05-15 DIAGNOSIS — E11.42 TYPE 2 DIABETES MELLITUS WITH DIABETIC POLYNEUROPATHY, WITH LONG-TERM CURRENT USE OF INSULIN: ICD-10-CM

## 2017-05-15 DIAGNOSIS — Z79.4 TYPE 2 DIABETES MELLITUS WITH DIABETIC POLYNEUROPATHY, WITH LONG-TERM CURRENT USE OF INSULIN: ICD-10-CM

## 2017-05-15 LAB
25(OH)D3+25(OH)D2 SERPL-MCNC: 45 NG/ML
CHOLEST/HDLC SERPL: 2.9 {RATIO}
ESTIMATED AVG GLUCOSE: 197 MG/DL
HBA1C MFR BLD HPLC: 8.5 %
HDL/CHOLESTEROL RATIO: 34.5 %
HDLC SERPL-MCNC: 142 MG/DL
HDLC SERPL-MCNC: 49 MG/DL
LDLC SERPL CALC-MCNC: 64.2 MG/DL
NONHDLC SERPL-MCNC: 93 MG/DL
T4 FREE SERPL-MCNC: 0.99 NG/DL
TRIGL SERPL-MCNC: 144 MG/DL
TSH SERPL DL<=0.005 MIU/L-ACNC: 4.41 UIU/ML

## 2017-05-15 PROCEDURE — 82306 VITAMIN D 25 HYDROXY: CPT

## 2017-05-15 PROCEDURE — 80061 LIPID PANEL: CPT

## 2017-05-15 PROCEDURE — 83036 HEMOGLOBIN GLYCOSYLATED A1C: CPT

## 2017-05-15 PROCEDURE — 36415 COLL VENOUS BLD VENIPUNCTURE: CPT

## 2017-05-15 PROCEDURE — 84443 ASSAY THYROID STIM HORMONE: CPT

## 2017-05-15 PROCEDURE — 84439 ASSAY OF FREE THYROXINE: CPT

## 2017-05-22 ENCOUNTER — OFFICE VISIT (OUTPATIENT)
Dept: ENDOCRINOLOGY | Facility: CLINIC | Age: 67
End: 2017-05-22
Payer: MEDICARE

## 2017-05-22 VITALS
WEIGHT: 172 LBS | SYSTOLIC BLOOD PRESSURE: 128 MMHG | HEIGHT: 62 IN | HEART RATE: 82 BPM | DIASTOLIC BLOOD PRESSURE: 82 MMHG | BODY MASS INDEX: 31.65 KG/M2

## 2017-05-22 DIAGNOSIS — E11.42 DIABETIC PERIPHERAL NEUROPATHY ASSOCIATED WITH TYPE 2 DIABETES MELLITUS: Primary | ICD-10-CM

## 2017-05-22 DIAGNOSIS — E11.69 HYPERLIPIDEMIA ASSOCIATED WITH TYPE 2 DIABETES MELLITUS: ICD-10-CM

## 2017-05-22 DIAGNOSIS — E66.9 OBESITY (BMI 30.0-34.9): ICD-10-CM

## 2017-05-22 DIAGNOSIS — E78.5 HYPERLIPIDEMIA ASSOCIATED WITH TYPE 2 DIABETES MELLITUS: ICD-10-CM

## 2017-05-22 DIAGNOSIS — E11.59 HYPERTENSION ASSOCIATED WITH DIABETES: ICD-10-CM

## 2017-05-22 DIAGNOSIS — I15.2 HYPERTENSION ASSOCIATED WITH DIABETES: ICD-10-CM

## 2017-05-22 PROCEDURE — 99999 PR PBB SHADOW E&M-EST. PATIENT-LVL III: CPT | Mod: PBBFAC,,, | Performed by: NURSE PRACTITIONER

## 2017-05-22 PROCEDURE — 99214 OFFICE O/P EST MOD 30 MIN: CPT | Mod: S$PBB,,, | Performed by: NURSE PRACTITIONER

## 2017-05-22 PROCEDURE — 99213 OFFICE O/P EST LOW 20 MIN: CPT | Mod: PBBFAC | Performed by: NURSE PRACTITIONER

## 2017-05-22 NOTE — PROGRESS NOTES
"CC: Patient is here for management of Type 2 diabetes and review of medical conditions as listed in visit diagnosis.      HPI: Ms. Linda Fong is a 67 y.o. White female who was diagnosed with Type 2 in 1993. She has never been hospitalized r/t DM. She suffered a ICH after a fall years ago and has had decreased memory since then.     At her last visit she was started on Trulicity. However it was stopped temporarily r/t cost for a couple of weeks. She has been back on the Trulicity for the past 3 weeks.   A1C has trended up.   On Trulicity fasting readings are 220s, with Novolog only, reports readings are ~280s.   States overall she feels better when on Trulicity.   No hypoglycemia  Eats 3 meals per day Snacks on fruit or granola bars.     Exercises 3x/week. Member of [facility] [employer].     CURRENT DIABETIC MEDS: Lantus 36 units nightly, Metformin 1,000 mg bid, Trulicity 0.75 mg weekly  Uses Vials or Pens: Pens  Type of Glucose Meter: Accu-Chek Compact    Last Eye Exam: May 2016  Last Podiatry Exam: January 2015    REVIEW OF SYSTEMS  General: no weakness or fatigue.   Eyes: no blurry vision or eye pain.    Cardiac: no chest pain or palpitations.   Respiratory: no cough or dyspnea.   GI: no abdominal pain or nausea.   Skin: no rashes, wounds, or insulin injection site reactions.   Neuro: no numbness or tingling.   Endocrine: no polyuria, polydipsia, polyphagia.   Psych: sleeping well, no anxiety or depression.   Denies personal history of pancreatitis or pancreatic cancer. Denies family hx of thyroid cancer.     Vital Signs  /82   Pulse 82   Ht 5' 2" (1.575 m)   Wt 78 kg (172 lb)   BMI 31.46 kg/m²      Hemoglobin A1C   Date Value Ref Range Status   05/15/2017 8.5 (H) 4.5 - 6.2 % Final     Comment:     According to ADA guidelines, hemoglobin A1C <7.0% represents  optimal control in non-pregnant diabetic patients.  Different  metrics may apply to specific populations.   Standards of Medical Care in " Diabetes - 2016.  For the purpose of screening for the presence of diabetes:  <5.7%     Consistent with the absence of diabetes  5.7-6.4%  Consistent with increasing risk for diabetes   (prediabetes)  >or=6.5%  Consistent with diabetes  Currently no consensus exists for use of hemoglobin A1C  for diagnosis of diabetes for children.     02/10/2017 8.3 (H) 4.5 - 6.2 % Final     Comment:     According to ADA guidelines, hemoglobin A1C <7.0% represents  optimal control in non-pregnant diabetic patients.  Different  metrics may apply to specific populations.   Standards of Medical Care in Diabetes - 2016.  For the purpose of screening for the presence of diabetes:  <5.7%     Consistent with the absence of diabetes  5.7-6.4%  Consistent with increasing risk for diabetes   (prediabetes)  >or=6.5%  Consistent with diabetes  Currently no consensus exists for use of hemoglobin A1C  for diagnosis of diabetes for children.     10/12/2016 8.6 (H) 4.5 - 6.2 % Final     Comment:     According to ADA guidelines, hemoglobin A1C <7.0% represents  optimal control in non-pregnant diabetic patients.  Different  metrics may apply to specific populations.   Standards of Medical Care in Diabetes - 2016.  For the purpose of screening for the presence of diabetes:  <5.7%     Consistent with the absence of diabetes  5.7-6.4%  Consistent with increasing risk for diabetes   (prediabetes)  >or=6.5%  Consistent with diabetes  Currently no consensus exists for use of hemoglobin A1C  for diagnosis of diabetes for children.         Chemistry        Component Value Date/Time     10/12/2016 0931    K 4.8 10/12/2016 0931     10/12/2016 0931    CO2 31 (H) 10/12/2016 0931    BUN 14 10/12/2016 0931    CREATININE 0.7 10/12/2016 0931     (H) 10/12/2016 0931        Component Value Date/Time    CALCIUM 9.3 10/12/2016 0931    ALKPHOS 77 10/12/2016 0931    AST 21 10/12/2016 0931    ALT 23 10/12/2016 0931    BILITOT 0.9 10/12/2016 0931           Lab Results   Component Value Date    CHOL 142 05/15/2017    CHOL 151 02/16/2016    CHOL 152 03/17/2015     Lab Results   Component Value Date    HDL 49 05/15/2017    HDL 52 02/16/2016    HDL 52 03/17/2015     Lab Results   Component Value Date    LDLCALC 64.2 05/15/2017    LDLCALC 69.0 02/16/2016    LDLCALC 74.4 03/17/2015     Lab Results   Component Value Date    TRIG 144 05/15/2017    TRIG 150 02/16/2016    TRIG 128 03/17/2015     Lab Results   Component Value Date    CHOLHDL 34.5 05/15/2017    CHOLHDL 34.4 02/16/2016    CHOLHDL 34.2 03/17/2015     Lab Results   Component Value Date    TSH 4.408 (H) 05/15/2017     Lab Results   Component Value Date    MICALBCREAT 20.0 02/10/2017     Component      Latest Ref Rng 2/16/2016   Vit D, 1,25-Dihydroxy      20-79 pg/mL 35     PHYSICAL EXAMINATION  Constitutional: Appears well, no distress  Neck: Supple, trachea midline.   Respiratory: CTA without wheezes, even and unlabored.  Cardiovascular: RRR, S1 and S2 with no murmurs or carotid bruits.  Lymph: no edema.   Skin: warm and dry; no insulin site reactions observed.  Neuro: patient alert and cooperative.  Feet: refused foot exam today. Appropriate footwear.     Assessment and Plan  Problem List Items Addressed This Visit     Diabetic peripheral neuropathy associated with type 2 diabetes mellitus - Primary     DM uncontrolled.   Suspect r/t lack of Trulicity.   Despite cost of Trulicity she prefers to continue this.   Discussed increasing Trulicity to 1.5 mg dose, but she declines.   Continue current insulin doses as she is going out of town and will be engaging in more physical activity.   Anticipate lowered BG with physical activity.   Monitor BG 4x/day. Send logs in 2 weeks for review.                   Relevant Orders    Hemoglobin A1c    Comprehensive metabolic panel    Hypertension associated with diabetes     BP at goal. Continue Hyzaar.          Hyperlipidemia associated with type 2 diabetes mellitus      Continue Atorvastatin 20 mg daily.          Obesity (BMI 30.0-34.9)     Body mass index is 31.46 kg/m².   Discussed diet and exercise.   Increases insulin resistance.   Plans to increase physical activity.            Other Visit Diagnoses    None.         FOLLOW UP  Return in about 3 months (around 8/22/2017).

## 2017-05-22 NOTE — ASSESSMENT & PLAN NOTE
DM uncontrolled.   Suspect r/t lack of Trulicity.   Despite cost of Trulicity she prefers to continue this.   Discussed increasing Trulicity to 1.5 mg dose, but she declines.   Continue current insulin doses as she is going out of town and will be engaging in more physical activity.   Anticipate lowered BG with physical activity.   Monitor BG 4x/day. Send logs in 2 weeks for review.

## 2017-05-22 NOTE — ASSESSMENT & PLAN NOTE
Body mass index is 31.46 kg/m².   Discussed diet and exercise.   Increases insulin resistance.   Plans to increase physical activity.

## 2017-06-05 RX ORDER — LOSARTAN POTASSIUM AND HYDROCHLOROTHIAZIDE 12.5; 5 MG/1; MG/1
TABLET ORAL
Qty: 90 TABLET | Refills: 2 | Status: SHIPPED | OUTPATIENT
Start: 2017-06-05 | End: 2018-03-01 | Stop reason: SDUPTHER

## 2017-06-25 RX ORDER — METFORMIN HYDROCHLORIDE 1000 MG/1
TABLET ORAL
Qty: 180 TABLET | Refills: 2 | Status: SHIPPED | OUTPATIENT
Start: 2017-06-25 | End: 2019-02-06 | Stop reason: SDUPTHER

## 2017-07-06 ENCOUNTER — TELEPHONE (OUTPATIENT)
Dept: ENDOCRINOLOGY | Facility: CLINIC | Age: 67
End: 2017-07-06

## 2017-07-06 ENCOUNTER — OFFICE VISIT (OUTPATIENT)
Dept: ENDOCRINOLOGY | Facility: CLINIC | Age: 67
End: 2017-07-06
Payer: MEDICARE

## 2017-07-06 VITALS
DIASTOLIC BLOOD PRESSURE: 78 MMHG | HEIGHT: 64 IN | WEIGHT: 168.88 LBS | BODY MASS INDEX: 28.83 KG/M2 | SYSTOLIC BLOOD PRESSURE: 124 MMHG | HEART RATE: 80 BPM

## 2017-07-06 DIAGNOSIS — E66.3 OVERWEIGHT (BMI 25.0-29.9): ICD-10-CM

## 2017-07-06 DIAGNOSIS — E11.42 DIABETIC PERIPHERAL NEUROPATHY ASSOCIATED WITH TYPE 2 DIABETES MELLITUS: ICD-10-CM

## 2017-07-06 DIAGNOSIS — E11.59 HYPERTENSION ASSOCIATED WITH DIABETES: ICD-10-CM

## 2017-07-06 DIAGNOSIS — E78.5 HYPERLIPIDEMIA ASSOCIATED WITH TYPE 2 DIABETES MELLITUS: ICD-10-CM

## 2017-07-06 DIAGNOSIS — E55.9 VITAMIN D DEFICIENCY DISEASE: ICD-10-CM

## 2017-07-06 DIAGNOSIS — M85.80 OSTEOPENIA, UNSPECIFIED LOCATION: ICD-10-CM

## 2017-07-06 DIAGNOSIS — Z79.4 TYPE 2 DIABETES MELLITUS WITH HYPERGLYCEMIA, WITH LONG-TERM CURRENT USE OF INSULIN: Primary | ICD-10-CM

## 2017-07-06 DIAGNOSIS — I15.2 HYPERTENSION ASSOCIATED WITH DIABETES: ICD-10-CM

## 2017-07-06 DIAGNOSIS — R41.3 IMPAIRED MEMORY: ICD-10-CM

## 2017-07-06 DIAGNOSIS — E11.69 HYPERLIPIDEMIA ASSOCIATED WITH TYPE 2 DIABETES MELLITUS: ICD-10-CM

## 2017-07-06 DIAGNOSIS — E11.65 TYPE 2 DIABETES MELLITUS WITH HYPERGLYCEMIA, WITH LONG-TERM CURRENT USE OF INSULIN: Primary | ICD-10-CM

## 2017-07-06 PROCEDURE — 4010F ACE/ARB THERAPY RXD/TAKEN: CPT | Mod: ,,, | Performed by: INTERNAL MEDICINE

## 2017-07-06 PROCEDURE — 1126F AMNT PAIN NOTED NONE PRSNT: CPT | Mod: ,,, | Performed by: INTERNAL MEDICINE

## 2017-07-06 PROCEDURE — 99213 OFFICE O/P EST LOW 20 MIN: CPT | Mod: PBBFAC | Performed by: INTERNAL MEDICINE

## 2017-07-06 PROCEDURE — 99999 PR PBB SHADOW E&M-EST. PATIENT-LVL III: CPT | Mod: PBBFAC,,, | Performed by: INTERNAL MEDICINE

## 2017-07-06 PROCEDURE — 1159F MED LIST DOCD IN RCRD: CPT | Mod: ,,, | Performed by: INTERNAL MEDICINE

## 2017-07-06 PROCEDURE — 3045F PR MOST RECENT HEMOGLOBIN A1C LEVEL 7.0-9.0%: CPT | Mod: ,,, | Performed by: INTERNAL MEDICINE

## 2017-07-06 PROCEDURE — 99214 OFFICE O/P EST MOD 30 MIN: CPT | Mod: S$PBB,,, | Performed by: INTERNAL MEDICINE

## 2017-07-06 RX ORDER — INSULIN GLARGINE 100 [IU]/ML
38 INJECTION, SOLUTION SUBCUTANEOUS NIGHTLY
Qty: 3 BOX | Refills: 3 | Status: SHIPPED | OUTPATIENT
Start: 2017-07-06 | End: 2017-08-28 | Stop reason: SDUPTHER

## 2017-07-06 RX ORDER — INSULIN GLARGINE 100 [IU]/ML
38 INJECTION, SOLUTION SUBCUTANEOUS NIGHTLY
Qty: 3 BOX | Refills: 3 | Status: SHIPPED | OUTPATIENT
Start: 2017-07-06 | End: 2017-07-06 | Stop reason: SDUPTHER

## 2017-07-06 NOTE — PROGRESS NOTES
"CC: Patient is here for an urgent visit with c/o hyperglycemia which started this am     HPI: Ms. Linda Fong is a 67 y.o. White female who was diagnosed with Type 2 in 1993. She has never been hospitalized r/t DM. She suffered a ICH after a fall years ago and has had decreased memory since then.    She is a prior patient of FARZANEH Tomas NP with last documented office visit in 05/2017   This is the patient's initial visit with me today     Current Regimen:  Trulicity 0.75 mg once weekly   Lantus 36 units at bedtime   Metformin 1000 mg bid     Type of Glucose Meter: Accu-Chek Compact    This morning she reports eating a banana, bowl of sugar frosted cheerios with milk and coffee with milk and artificial sweetner. She checked BG after eating and it was over 400 mg/dL. In office glucose was 330 mg/dL     She states fasting sugars are normally between 125-130's     She denies missing or skipping doses of her medication     She denies polyuria, polyphagia or blurred vision      She denies nausea, vomiting or abdominal pain     Denies recent exposure to steroids   Denies UTI   Denies recent viral illness   Denies fever      Exercises 3x/week. Member of [employer] [employer].       Last Eye Exam: May 2016  Last Podiatry Exam: January 2015    REVIEW OF SYSTEMS  General: no weakness or fatigue.   Eyes: no blurry vision or eye pain.    Cardiac: no chest pain or palpitations.   Respiratory: no cough or dyspnea.   GI: no abdominal pain or nausea.   Skin: no rashes, wounds, or insulin injection site reactions.   Neuro: no numbness or tingling.   Endocrine: no polyuria, polydipsia, polyphagia.   Psych: sleeping well, no anxiety or depression.   Denies personal history of pancreatitis or pancreatic cancer. Denies family hx of thyroid cancer.     Vital Signs  /78   Pulse 80   Ht 5' 4" (1.626 m)   Wt 76.6 kg (168 lb 14 oz)   BMI 28.99 kg/m²     Hemoglobin A1C   Date Value Ref Range Status   05/15/2017 8.5 (H) 4.5 - 6.2 % " Final     Comment:     According to ADA guidelines, hemoglobin A1C <7.0% represents  optimal control in non-pregnant diabetic patients.  Different  metrics may apply to specific populations.   Standards of Medical Care in Diabetes - 2016.  For the purpose of screening for the presence of diabetes:  <5.7%     Consistent with the absence of diabetes  5.7-6.4%  Consistent with increasing risk for diabetes   (prediabetes)  >or=6.5%  Consistent with diabetes  Currently no consensus exists for use of hemoglobin A1C  for diagnosis of diabetes for children.     02/10/2017 8.3 (H) 4.5 - 6.2 % Final     Comment:     According to ADA guidelines, hemoglobin A1C <7.0% represents  optimal control in non-pregnant diabetic patients.  Different  metrics may apply to specific populations.   Standards of Medical Care in Diabetes - 2016.  For the purpose of screening for the presence of diabetes:  <5.7%     Consistent with the absence of diabetes  5.7-6.4%  Consistent with increasing risk for diabetes   (prediabetes)  >or=6.5%  Consistent with diabetes  Currently no consensus exists for use of hemoglobin A1C  for diagnosis of diabetes for children.     10/12/2016 8.6 (H) 4.5 - 6.2 % Final     Comment:     According to ADA guidelines, hemoglobin A1C <7.0% represents  optimal control in non-pregnant diabetic patients.  Different  metrics may apply to specific populations.   Standards of Medical Care in Diabetes - 2016.  For the purpose of screening for the presence of diabetes:  <5.7%     Consistent with the absence of diabetes  5.7-6.4%  Consistent with increasing risk for diabetes   (prediabetes)  >or=6.5%  Consistent with diabetes  Currently no consensus exists for use of hemoglobin A1C  for diagnosis of diabetes for children.         Chemistry        Component Value Date/Time     10/12/2016 0931    K 4.8 10/12/2016 0931     10/12/2016 0931    CO2 31 (H) 10/12/2016 0931    BUN 14 10/12/2016 0931    CREATININE 0.7  10/12/2016 0931     (H) 10/12/2016 0931        Component Value Date/Time    CALCIUM 9.3 10/12/2016 0931    ALKPHOS 77 10/12/2016 0931    AST 21 10/12/2016 0931    ALT 23 10/12/2016 0931    BILITOT 0.9 10/12/2016 0931          Lab Results   Component Value Date    CHOL 142 05/15/2017    CHOL 151 02/16/2016    CHOL 152 03/17/2015     Lab Results   Component Value Date    HDL 49 05/15/2017    HDL 52 02/16/2016    HDL 52 03/17/2015     Lab Results   Component Value Date    LDLCALC 64.2 05/15/2017    LDLCALC 69.0 02/16/2016    LDLCALC 74.4 03/17/2015     Lab Results   Component Value Date    TRIG 144 05/15/2017    TRIG 150 02/16/2016    TRIG 128 03/17/2015     Lab Results   Component Value Date    CHOLHDL 34.5 05/15/2017    CHOLHDL 34.4 02/16/2016    CHOLHDL 34.2 03/17/2015     Lab Results   Component Value Date    TSH 4.408 (H) 05/15/2017     Lab Results   Component Value Date    MICALBCREAT 20.0 02/10/2017     Component      Latest Ref Rng 2/16/2016   Vit D, 1,25-Dihydroxy      20-79 pg/mL 35     Physical Exam   Constitutional: She appears well-developed. No distress.   Neck: No thyromegaly present.   Cardiovascular: Normal rate and regular rhythm.    Pulses:       Dorsalis pedis pulses are 2+ on the right side, and 2+ on the left side.   Pulmonary/Chest: Effort normal.   Abdominal: Soft.   Musculoskeletal: She exhibits no edema.        Right foot: There is no deformity.        Left foot: There is no deformity.   Feet:   Right Foot:   Protective Sensation: 6 sites tested. 6 sites sensed.   Skin Integrity: Negative for ulcer, blister, skin breakdown, callus or dry skin.   Left Foot:   Protective Sensation: 6 sites tested. 6 sites sensed.   Skin Integrity: Negative for ulcer, blister, skin breakdown, callus or dry skin.   Neurological: She is alert.   Vibratory sensation intact bilaterally    Skin: Skin is warm and dry.   injection sites are ok. No lipo hypertropthy or atrophy      Feet   Shoes  appropriate  sensation intact to vibration and monofilament     Psychiatric: She has a normal mood and affect.   Nursing note and vitals reviewed.    Assessment and Plan  1. T2DM with hyperglycemia   - increase Lantus 38 units once daily   - continue Metformin and Trulicity   - notify office on Monday with glucose readings   - ER prompts reviewed   - advised to stay well hydrated   - discuss use of Novolog, pt declined at this time   - continue dietary and lifestyle modifications     2.. Impaired memory   - refer to Neurology for further evaluation     3. Hypertension   - stable and controlled   - follow a low salt diet   - followed by PCP     4. Osteopenia   - stable BMD   - encouraged fall safety and fall precautions   - weight bearing exercises as tolerated   - RDA of calcium and vitamin d , calcium from food sources are preferred       Problem List Items Addressed This Visit        Neuro    Impaired memory    Relevant Orders    Ambulatory consult to Neurology       Cardiac    Hypertension associated with diabetes       Endocrine    Type 2 diabetes mellitus with hyperglycemia, with long-term current use of insulin - Primary    Relevant Medications    insulin glargine (LANTUS SOLOSTAR) 100 unit/mL (3 mL) InPn pen       Fluids/Electrolytes/Nutrition/GI    Hyperlipidemia associated with type 2 diabetes mellitus    Vitamin D deficiency disease    Overweight (BMI 25.0-29.9)       Musculoskeletal and Integument    Osteopenia      Other Visit Diagnoses    None.          FOLLOW UP  Keep follow up appointment scheduled with Diabetes NP

## 2017-07-06 NOTE — TELEPHONE ENCOUNTER
Left a message that FARZANEH Tomas is out of the office today , but we do have an appointment with Becca RIVERA Today at 1:30 if she could come in.

## 2017-07-06 NOTE — TELEPHONE ENCOUNTER
----- Message from Fariha Godinez sent at 7/6/2017  9:42 AM CDT -----  Contact: Linda   tel:  [phone]  Caller says :   Wants to see Clarence today.   Sugar is 400 and something .   It was ok last night.    Asking if you can see her today.    Pls call .

## 2017-07-06 NOTE — TELEPHONE ENCOUNTER
----- Message from Fariha Godinez sent at 7/6/2017  9:42 AM CDT -----  Contact: Linda   tel:  [phone]  Caller says :   Wants to see Clarence today.   Sugar is 400 and something .   It was ok last night.    Asking if you can see her today.    Pls call .

## 2017-07-27 ENCOUNTER — CLINICAL SUPPORT (OUTPATIENT)
Dept: INFECTIOUS DISEASES | Facility: CLINIC | Age: 67
End: 2017-07-27
Payer: MEDICARE

## 2017-07-27 DIAGNOSIS — Z23 NEED FOR VACCINATION: Primary | ICD-10-CM

## 2017-07-27 PROCEDURE — 99212 OFFICE O/P EST SF 10 MIN: CPT | Mod: PBBFAC

## 2017-07-27 PROCEDURE — 99999 PR PBB SHADOW E&M-EST. PATIENT-LVL II: CPT | Mod: PBBFAC,,,

## 2017-07-27 PROCEDURE — 90636 HEP A/HEP B VACC ADULT IM: CPT | Mod: PBBFAC

## 2017-08-22 ENCOUNTER — LAB VISIT (OUTPATIENT)
Dept: LAB | Facility: HOSPITAL | Age: 67
End: 2017-08-22
Payer: MEDICARE

## 2017-08-22 DIAGNOSIS — E11.42 DIABETIC PERIPHERAL NEUROPATHY ASSOCIATED WITH TYPE 2 DIABETES MELLITUS: ICD-10-CM

## 2017-08-22 LAB
ALBUMIN SERPL BCP-MCNC: 3.4 G/DL
ALP SERPL-CCNC: 80 U/L
ALT SERPL W/O P-5'-P-CCNC: 21 U/L
ANION GAP SERPL CALC-SCNC: 11 MMOL/L
AST SERPL-CCNC: 21 U/L
BILIRUB SERPL-MCNC: 1.7 MG/DL
BUN SERPL-MCNC: 15 MG/DL
CALCIUM SERPL-MCNC: 9.8 MG/DL
CHLORIDE SERPL-SCNC: 101 MMOL/L
CO2 SERPL-SCNC: 29 MMOL/L
CREAT SERPL-MCNC: 0.8 MG/DL
EST. GFR  (AFRICAN AMERICAN): >60 ML/MIN/1.73 M^2
EST. GFR  (NON AFRICAN AMERICAN): >60 ML/MIN/1.73 M^2
ESTIMATED AVG GLUCOSE: 186 MG/DL
GLUCOSE SERPL-MCNC: 128 MG/DL
HBA1C MFR BLD HPLC: 8.1 %
POTASSIUM SERPL-SCNC: 4.6 MMOL/L
PROT SERPL-MCNC: 7.2 G/DL
SODIUM SERPL-SCNC: 141 MMOL/L

## 2017-08-22 PROCEDURE — 83036 HEMOGLOBIN GLYCOSYLATED A1C: CPT

## 2017-08-22 PROCEDURE — 36415 COLL VENOUS BLD VENIPUNCTURE: CPT

## 2017-08-22 PROCEDURE — 80053 COMPREHEN METABOLIC PANEL: CPT

## 2017-08-28 ENCOUNTER — OFFICE VISIT (OUTPATIENT)
Dept: ENDOCRINOLOGY | Facility: CLINIC | Age: 67
End: 2017-08-28
Payer: MEDICARE

## 2017-08-28 VITALS
HEART RATE: 80 BPM | BODY MASS INDEX: 28.88 KG/M2 | HEIGHT: 64 IN | DIASTOLIC BLOOD PRESSURE: 76 MMHG | SYSTOLIC BLOOD PRESSURE: 119 MMHG | WEIGHT: 169.19 LBS

## 2017-08-28 DIAGNOSIS — E78.5 HYPERLIPIDEMIA ASSOCIATED WITH TYPE 2 DIABETES MELLITUS: ICD-10-CM

## 2017-08-28 DIAGNOSIS — E11.59 HYPERTENSION ASSOCIATED WITH DIABETES: ICD-10-CM

## 2017-08-28 DIAGNOSIS — I15.2 HYPERTENSION ASSOCIATED WITH DIABETES: ICD-10-CM

## 2017-08-28 DIAGNOSIS — E11.42 DIABETIC PERIPHERAL NEUROPATHY ASSOCIATED WITH TYPE 2 DIABETES MELLITUS: Primary | ICD-10-CM

## 2017-08-28 DIAGNOSIS — E11.69 HYPERLIPIDEMIA ASSOCIATED WITH TYPE 2 DIABETES MELLITUS: ICD-10-CM

## 2017-08-28 PROCEDURE — 99214 OFFICE O/P EST MOD 30 MIN: CPT | Mod: S$PBB,,, | Performed by: NURSE PRACTITIONER

## 2017-08-28 PROCEDURE — 3074F SYST BP LT 130 MM HG: CPT | Mod: ,,, | Performed by: NURSE PRACTITIONER

## 2017-08-28 PROCEDURE — 4010F ACE/ARB THERAPY RXD/TAKEN: CPT | Mod: ,,, | Performed by: NURSE PRACTITIONER

## 2017-08-28 PROCEDURE — 1126F AMNT PAIN NOTED NONE PRSNT: CPT | Mod: ,,, | Performed by: NURSE PRACTITIONER

## 2017-08-28 PROCEDURE — 99999 PR PBB SHADOW E&M-EST. PATIENT-LVL IV: CPT | Mod: PBBFAC,,, | Performed by: NURSE PRACTITIONER

## 2017-08-28 PROCEDURE — 99214 OFFICE O/P EST MOD 30 MIN: CPT | Mod: PBBFAC | Performed by: NURSE PRACTITIONER

## 2017-08-28 PROCEDURE — 1159F MED LIST DOCD IN RCRD: CPT | Mod: ,,, | Performed by: NURSE PRACTITIONER

## 2017-08-28 PROCEDURE — 3045F PR MOST RECENT HEMOGLOBIN A1C LEVEL 7.0-9.0%: CPT | Mod: ,,, | Performed by: NURSE PRACTITIONER

## 2017-08-28 PROCEDURE — 3078F DIAST BP <80 MM HG: CPT | Mod: ,,, | Performed by: NURSE PRACTITIONER

## 2017-08-28 RX ORDER — INSULIN GLARGINE 100 [IU]/ML
42 INJECTION, SOLUTION SUBCUTANEOUS NIGHTLY
Qty: 3 BOX | Refills: 3
Start: 2017-08-28 | End: 2018-08-29

## 2017-08-28 NOTE — PROGRESS NOTES
"CC: Patient is here for management of Type 2 diabetes and review of medical conditions as listed in visit diagnosis.      HPI: Ms. Linda oFng is a 67 y.o. White female who was diagnosed with Type 2 in 1993. She has never been hospitalized r/t DM. She suffered a ICH after a fall years ago and has had decreased memory since then.Denies personal history of pancreatitis or pancreatic cancer. Denies family hx of thyroid cancer.      Since last visit she reports having symptoms of confusion and memory loss with hyperglycemia in 400s. Saw ELADIO Irby NP at that time and is also scheduled to see Neurology in October 2017.     BG logs presented for review.     Average . Logs reviewed show readings of 119-143 in the past week, but previous readings this month have been up to 200s. States in July she had BGs in 300s and 400s, which was during her episode of confusion and memory loss as documented. These symptoms have since resolved.     No missed doses of diabetes medications.     Good appetite.     Exercises at [employer] [employer] [facility].     CURRENT DIABETIC MEDS: Lantus 42 units nightly, Metformin 1,000 mg bid, Trulicity 0.75 mg weekly  Uses Vials or Pens: Pens  Type of Glucose Meter: Accu-Chek Compact    Last Eye Exam: 2016  Last Podiatry Exam: 2015    REVIEW OF SYSTEMS  General: no weakness or fatigue.   Eyes: no blurry vision or eye pain.    Cardiac: no chest pain or palpitations.   Respiratory: no cough or dyspnea.   GI: no abdominal pain or nausea.   Skin: no rashes, wounds, or insulin injection site reactions.   Neuro: no numbness or tingling; no dizziness  Endocrine: no polyuria, polydipsia, polyphagia.   Psych: sleeping well, no anxiety or depression.       Vital Signs  /76 (BP Location: Right arm, Patient Position: Sitting)   Pulse 80   Ht 5' 4" (1.626 m)   Wt 76.7 kg (169 lb 3.2 oz)   BMI 29.04 kg/m²     Hemoglobin A1C   Date Value Ref Range Status   08/22/2017 8.1 (H) 4.0 - 5.6 % Final    "  Comment:     According to ADA guidelines, hemoglobin A1c <7.0% represents  optimal control in non-pregnant diabetic patients. Different  metrics may apply to specific patient populations.   Standards of Medical Care in Diabetes-2016.  For the purpose of screening for the presence of diabetes:  <5.7%     Consistent with the absence of diabetes  5.7-6.4%  Consistent with increasing risk for diabetes   (prediabetes)  >or=6.5%  Consistent with diabetes  Currently, no consensus exists for use of hemoglobin A1c  for diagnosis of diabetes for children.  This Hemoglobin A1c assay has significant interference with fetal   hemoglobin   (HbF). The results are invalid for patients with abnormal amounts of   HbF,   including those with known Hereditary Persistence   of Fetal Hemoglobin. Heterozygous hemoglobin variants (HbAS, HbAC,   HbAD, HbAE, HbA2) do not significantly interfere with this assay;   however, presence of multiple variants in a sample may impact the %   interference.     05/15/2017 8.5 (H) 4.5 - 6.2 % Final     Comment:     According to ADA guidelines, hemoglobin A1C <7.0% represents  optimal control in non-pregnant diabetic patients.  Different  metrics may apply to specific populations.   Standards of Medical Care in Diabetes - 2016.  For the purpose of screening for the presence of diabetes:  <5.7%     Consistent with the absence of diabetes  5.7-6.4%  Consistent with increasing risk for diabetes   (prediabetes)  >or=6.5%  Consistent with diabetes  Currently no consensus exists for use of hemoglobin A1C  for diagnosis of diabetes for children.     02/10/2017 8.3 (H) 4.5 - 6.2 % Final     Comment:     According to ADA guidelines, hemoglobin A1C <7.0% represents  optimal control in non-pregnant diabetic patients.  Different  metrics may apply to specific populations.   Standards of Medical Care in Diabetes - 2016.  For the purpose of screening for the presence of diabetes:  <5.7%     Consistent with the absence  of diabetes  5.7-6.4%  Consistent with increasing risk for diabetes   (prediabetes)  >or=6.5%  Consistent with diabetes  Currently no consensus exists for use of hemoglobin A1C  for diagnosis of diabetes for children.         Chemistry        Component Value Date/Time     08/22/2017 1014    K 4.6 08/22/2017 1014     08/22/2017 1014    CO2 29 08/22/2017 1014    BUN 15 08/22/2017 1014    CREATININE 0.8 08/22/2017 1014     (H) 08/22/2017 1014        Component Value Date/Time    CALCIUM 9.8 08/22/2017 1014    ALKPHOS 80 08/22/2017 1014    AST 21 08/22/2017 1014    ALT 21 08/22/2017 1014    BILITOT 1.7 (H) 08/22/2017 1014          Lab Results   Component Value Date    CHOL 142 05/15/2017    CHOL 151 02/16/2016    CHOL 152 03/17/2015     Lab Results   Component Value Date    HDL 49 05/15/2017    HDL 52 02/16/2016    HDL 52 03/17/2015     Lab Results   Component Value Date    LDLCALC 64.2 05/15/2017    LDLCALC 69.0 02/16/2016    LDLCALC 74.4 03/17/2015     Lab Results   Component Value Date    TRIG 144 05/15/2017    TRIG 150 02/16/2016    TRIG 128 03/17/2015     Lab Results   Component Value Date    CHOLHDL 34.5 05/15/2017    CHOLHDL 34.4 02/16/2016    CHOLHDL 34.2 03/17/2015     Lab Results   Component Value Date    TSH 4.408 (H) 05/15/2017     Lab Results   Component Value Date    MICALBCREAT 20.0 02/10/2017     Component      Latest Ref Rng 2/16/2016   Vit D, 1,25-Dihydroxy      20-79 pg/mL 35     PHYSICAL EXAMINATION  Constitutional: Appears well, no distress  Neck: Supple, trachea midline.   Respiratory: CTA without wheezes, even and unlabored.  Cardiovascular: RRR, S1 and S2 with no murmurs or carotid bruits.  Lymph: no edema.   Skin: warm and dry; no insulin site reactions observed.  Neuro: patient alert and cooperative.  Feet:  Protective Sensation (w/ 10 gram monofilament and tuning fork):  Right: Decreased  Left: Decreased    Visual Inspection:  Normal -  Bilateral    Pedal Pulses (DP):    Right: Present  Left: Present      Assessment and Plan    Diabetic peripheral neuropathy associated with Type 2 DM  DM improving  Increase Trulicity to 1.5 mg weekly--okay to start in 3 weeks when she is due for a refill  Continue current doses of Lantus and Metformin  Monitor 2x/daily and bring logs to next visit    Hypertension  BP at goal  Continue current medications    Hyperlipidemia  Lipids at goal  Continue statin        FOLLOW UP  Return in about 4 months (around 12/28/2017).

## 2017-09-05 ENCOUNTER — OFFICE VISIT (OUTPATIENT)
Dept: OPTOMETRY | Facility: CLINIC | Age: 67
End: 2017-09-05
Payer: MEDICARE

## 2017-09-05 DIAGNOSIS — H52.7 REFRACTIVE ERROR: ICD-10-CM

## 2017-09-05 DIAGNOSIS — Z79.4 TYPE 2 DIABETES MELLITUS WITH HYPERGLYCEMIA, WITH LONG-TERM CURRENT USE OF INSULIN: Primary | ICD-10-CM

## 2017-09-05 DIAGNOSIS — G43.109 OCULAR MIGRAINE: ICD-10-CM

## 2017-09-05 DIAGNOSIS — H04.123 BILATERAL DRY EYES: ICD-10-CM

## 2017-09-05 DIAGNOSIS — E11.65 TYPE 2 DIABETES MELLITUS WITH HYPERGLYCEMIA, WITH LONG-TERM CURRENT USE OF INSULIN: Primary | ICD-10-CM

## 2017-09-05 DIAGNOSIS — H25.13 NUCLEAR SCLEROTIC CATARACT OF BOTH EYES: ICD-10-CM

## 2017-09-05 DIAGNOSIS — Z79.84 LONG TERM CURRENT USE OF ORAL HYPOGLYCEMIC DRUG: ICD-10-CM

## 2017-09-05 PROCEDURE — 99212 OFFICE O/P EST SF 10 MIN: CPT | Mod: PBBFAC | Performed by: OPTOMETRIST

## 2017-09-05 PROCEDURE — 92014 COMPRE OPH EXAM EST PT 1/>: CPT | Mod: S$PBB,,, | Performed by: OPTOMETRIST

## 2017-09-05 PROCEDURE — 99999 PR PBB SHADOW E&M-EST. PATIENT-LVL II: CPT | Mod: PBBFAC,,, | Performed by: OPTOMETRIST

## 2017-09-05 NOTE — PATIENT INSTRUCTIONS
"DRY EYES     Dry eyes is a common condition. It can be caused by an insufficient volume of tears, or by tears that do not spread evenly over the cornea. An uneven tear film can also cause vision to blur intermittently.   Dry eyes are often associated with burning, stinging, and/or red eyes.As we age, the eyes usually produce fewer tears and result in decreased normal eye lubrication. Paradoxically, dry eye can make eyes water. When they water, that watery production actually makes the dry eye situation worse because the watery tears do not lubricate the eye well at all. Watery tears really serve to flush foreign material out of the eye, not to lubricate. Unfortunately, when the eyes get really dry they become irritated and stimulate the formation of watery tears.   Lubricants, in the form of drops or ointments, are the principal treatment for dry eyes. The following are recommendations for managing your dry eye condition:    1) Use over-the-counter artificial tears or lubricants (such as Systane Balance, Soothe XP, Refresh Optive, Blink, or Genteal), 1 drop in each eye 3 to 4 times a day. Please avoid drops that advertise redness relief since these can be unhealthy for your eyes and can cause permanent redness if used long-term.     It is best to take artificial tears on a schedule, like you would for a medication. Taking them routinely at breakfast, lunch, and dinner for example will provide better relief and better use of the tear drop than taking them whenever your eyes "feel" dry.    2) Optional use of over-the-counter lubricating gels (such as Genteal Gel, Systane Gel, or Refresh PM) applied to the inside of the lower lid of both eyes at bedtime. This gel is very thick and may cause blurred vision, so we recommend you use it before bedtime. In the morning you can gently wipe your eyes with a damp washcloth to remove any residual gel.    3) Warm compresses applied to the closed eyelids twice per day, followed " with lid massage.    4) Always drink an adequate amount of water daily (4-6 cups a day).    5) 1000 mg of good quality fish oil (labeled DHA and/or EPA). Flaxseed oil can also be beneficial. Do not take fish oil if you are currently taking a medication called warfarin or coumadin.    6) Use a humidifier in your bedroom.    7) If the dryness continues there may be additional options of punctal plugs, or prescription dry eye drops such as Restasis or Xiidra. Punctal plugs are medical devices inserted into the puncta or the drain of the eye to block some of your natural tears from draining off the eye. Restasis and Xiidra are prescription eye drops that, over time, may help your body make more tears naturally.    It is important to maintain this treatment plan until your symptoms have improved. Once improved, you can reduce the frequency of lubricants and warm compresses. If the symptoms recur, then apply as needed for comfort.    ==============================================    OCULAR MIGRAINE    While the word migraine conjures up thoughts of excruciating headaches (and rightfully so), an ophthalmic or ocular migraine is often unaccompanied by any pain. It is, however, a variety of migraine. Its hallmark is a set of unique visual disturbances.    First you might notice a shimmering around the periphery of your vision. This flickering progresses inward, often with jagged edges. The aura continues to shut in, closing off or blurring your side vision. Interestingly enough, you can see the aura even with your eyes closed. Eventually the disturbance begins to open back up until it is gone. Most episodes last 10 to 50 minutes, with full visual recovery. An ocular migraine may occur in one eye or both.    It has been suggested that ocular migraine may be triggered by stress, certain foods (for example:  chocolate, william, aged cheeses, cashew nuts), flashing lights, and hormonal changes. The visual display is not caused by  anything in the eye per se, but rather by disturbances in the vasculature of the brain (as are migraine headaches). No treatment is generally given unless the patient begins to experience headaches during or after the aura.    ==============================================    CATARACT    Symptoms and Signs:  A cataract starts out small, and at first has little effect on your vision. You may notice that your vision is blurred a little, like looking through a cloudy piece of glass or viewing an impressionist painting. A cataract may make light from the sun or a lamp seem too bright or glaring. Or you may notice when you drive at night that the oncoming headlights cause more glare than before. Colors may not appear as bright as they once did.  The type of cataract you have will affect exactly which symptoms you experience and how soon they will occur. When a nuclear cataract first develops it can bring about a temporary improvement in your near vision, called second sight. Unfortunately, the improved vision is short-lived and will disappear as the cataract worsens. Meanwhile, a sub-capsular cataract may not produce any symptoms until it's well-developed.    Causes:  No one knows for sure why the eye's lens changes as we age, forming cataracts. Researchers are gradually identifying factors that may cause cataracts - and information that may help to prevent them.  Many studies suggest that exposure to ultraviolet light is associated with cataracts, so eye care practitioners recommend wearing sunglasses and a wide-brimmed hat to lessen your exposure.  Other studies suggest people with diabetes are at risk for developing a cataract.   Some eye care practitioners believe that a diet high in antioxidants, such as beta-carotene (vitamin A), selenium and vitamins C and E, may forestall cataracts.  The most important of these is probably vitamin C; it might be helpful to supplement the diet with an extra Vitamin C tablet.   Meanwhile, eating a lot of salt may increase your risk.  Other risk factors include cigarette smoke, air pollution and heavy alcohol consumption.  We simply recommend that you be careful to use sunglasses and to take Vitamin C.    Treatment:  When symptoms begin to appear, we can improve your vision for a while using new glasses, strong bifocals, magnification, appropriate lighting or other visual aids.  This is true in your case; your cataract does not impact your vision very much at this time. If you experience any of the symptoms we described you can return at any time. Otherwise it is fine to see you in 1 year.

## 2017-09-05 NOTE — PROGRESS NOTES
HPI     Ms. Linda Fong is here today for her annual diabetic eye exam    Pt comes in today with the concerns of having what she thinks may have   been an ocular migraine yesterday. For about 30 minutes she saw a bright   white light in her central vision with no distortion in the periphery, pt   states that vision returned to normal after episode. Unsure if it was in   one or both eyes. No previous occurrences.     Pt also states that she broke her glasses and used her lens form the   prescription from 5/3/16 and put them into a new frame in Summer of 2017,   and since then she has had trouble with her vision, pt states it looks as   if everything has a halo around it.     (+)drops: used Visine a few times this week to relief the crusting in eyes   in the morning   (-)flashes  (-)floaters  (-)diplopia    Diabetic yes   She says blood glucose has been unstable and dose of Trulicity will be   increased in a few weeks.  Fasting blood glucose was 93 mg/dL this morning, but averages around 154   mg/dL.   Hemoglobin A1C       Date                     Value               Ref Range             Status                08/22/2017               8.1 (H)             4.0 - 5.6 %           Final                 05/15/2017               8.5 (H)             4.5 - 6.2 %           Final                 02/10/2017               8.3 (H)             4.5 - 6.2 %           Final                OCULAR HISTORY  Last Eye Exam 05/03/16 with Dr. Mcdoanld   (-)eye surgery   Cataracts OU  Mild background diabetic retinopathy OU (noted in 09/2014, no retinopathy   OU in 2016)    FAMILY HISTORY  (?)Glaucoma: not sure, pt's father may have had glaucoma       Last edited by Tiarra Mcdonald, OD on 9/5/2017  4:11 PM. (History)            Assessment /Plan     For exam results, see Encounter Report.    Type 2 diabetes mellitus with hyperglycemia, with long-term current use of insulin  Long term current use of oral hypoglycemic drug   No retinopathy noted OU.  Continue management of DM as directed by PCP. Monitor with DFE in 1 year, or RTC immediately with any vision changes.     Ocular migraine   Pt reassured. Monitor.    Nuclear sclerotic cataract of both eyes   Cause of halos. Surgery not yet indicated. Monitor yearly.    Bilateral dry eyes   Cause of crusting upon waking. Discontinue Visine. Recommended oil-based artificial tears BID-TID OU long-term.    Refractive error   Refraction deferred today due to high/unstable blood glucose (dose of Trulicity will be increased in a few weeks). Patient educated that refractive error changes with blood glucose fluctuations. RTC once blood glucose lower/stable for refraction in the future if needed.         RTC 1 year, or sooner prn

## 2017-09-11 RX ORDER — ERGOCALCIFEROL 1.25 MG/1
CAPSULE ORAL
Qty: 3 CAPSULE | Refills: 2 | Status: SHIPPED | OUTPATIENT
Start: 2017-09-11 | End: 2018-08-29 | Stop reason: SDUPTHER

## 2017-10-04 ENCOUNTER — OFFICE VISIT (OUTPATIENT)
Dept: NEUROLOGY | Facility: CLINIC | Age: 67
End: 2017-10-04
Payer: MEDICARE

## 2017-10-04 VITALS
HEART RATE: 87 BPM | DIASTOLIC BLOOD PRESSURE: 83 MMHG | BODY MASS INDEX: 29.28 KG/M2 | HEIGHT: 64 IN | WEIGHT: 171.5 LBS | SYSTOLIC BLOOD PRESSURE: 128 MMHG

## 2017-10-04 DIAGNOSIS — R41.89 SPELL OF ALTERED COGNITION: Primary | ICD-10-CM

## 2017-10-04 DIAGNOSIS — I67.89 OTHER CEREBROVASCULAR DISEASE: ICD-10-CM

## 2017-10-04 PROCEDURE — 99999 PR PBB SHADOW E&M-EST. PATIENT-LVL IV: CPT | Mod: PBBFAC,,, | Performed by: PSYCHIATRY & NEUROLOGY

## 2017-10-04 PROCEDURE — 99204 OFFICE O/P NEW MOD 45 MIN: CPT | Mod: S$PBB,,, | Performed by: PSYCHIATRY & NEUROLOGY

## 2017-10-04 PROCEDURE — 99214 OFFICE O/P EST MOD 30 MIN: CPT | Mod: PBBFAC | Performed by: PSYCHIATRY & NEUROLOGY

## 2017-10-04 NOTE — PROGRESS NOTES
"Name: Linda Fong  MRN: [id number]   CSN: [id number]      Date: 10/04/2017    Referring physician:  Carmel Irby, ALEXANDER  [address] [place] [address]    Chief Complaint / Interval History: No chief complaint on file.      History of Present Illness (HPI):    On , awoke and "felt confused".  Thinks awoke a little later than usual.  Usual night before, except she had been having more recent stressors (financially - having issues with the checkbook, worried about expenses).  Felt disoriented, "knew something was different".  Recognized herself and , and knew it was her house.  But looking at the checkbook, she couldn't figure it out.  Felt like words were not recognizable.  Did feel strong, no numbness.  Was afraid to drive.    The spell lasted for "a couple of days".  Didn't tell her  (Jaison).  Returned to her baseline then.  Feels about "normal" for age, except some mild baseline word-finding difficulties and some short-term issues (though can usually be reminded well).    On , she had "ocular migraine".  Was just sitting and talking to Jaison and there was a "bright light" in the center of her vision with both eyes open.  Did NOT think it was along one side of her meridian or the other.  Lasted for 30 minutes.  Had no headache.  On , optometry proposed ocular migraine.    Fell once in , anosmia since then.  Thinks it showed up on a scan.       50+ years!  Have three adults kids, nine grands.  Grandfather  in his 50s (presumed heart disease and skin disease), father had multiple strokes in his 70s.    Has insulin dependent diabetes.  Feels it is under good control.    Nonmotor/Premotor ROS:  Hyposmia (HENT)?Yes  RBD/sleep issues (Constitutional)?No  Depression/anxiety (Psychiatric)?No  Fatigue (Constitutional)?No  Constipation (GI)?No  Urinary issues ()?Yes - some stress incontinence, but usually can hold it  Sexual dysfunction ()?No - " "appropiate  Orthostasis (Cardiovascular)?No  Leg swelling (Cardiovascular)? Yes - left ankle is a bit more swollen, comes and goes  Falls (Musculoskeletal)?Yes - feels "her ear rocks get out of place" and she does the Epley maneuver  Cognitive impairment (Neurologic)?Yes  Psychoses (Psychiatric)?No  Pain/Paresthesia (Neurologic)?No  Visual changes (Eyes)?No  Moles / skin changes (Skin)?No  Stridor / SOB (Pulm)?No  Bruising (Heme)?No    Past Medical History: The patient  has a past medical history of Diabetic nephropathy; DJD (degenerative joint disease); Goiter; HTN (hypertension); Hyperlipidemia; Intracranial bleeding; Osteopenia; PN (peripheral neuropathy); Traumatic brain injury (Dec 2010); and Type II or unspecified type diabetes mellitus with renal manifestations, uncontrolled(250.42).    Social History: The patient  reports that she has never smoked. She has never used smokeless tobacco. She reports that she does not drink alcohol or use drugs.    Family History: Their family history includes Breast cancer in her mother; Cancer in her mother; Cataracts in her father; Diabetes in her father, maternal grandmother, mother, and paternal grandmother; Hypertension in her father, maternal grandfather, maternal grandmother, mother, paternal grandfather, and paternal grandmother; Stroke in her father, maternal grandfather, maternal grandmother, mother, paternal grandfather, and paternal grandmother; Thyroid disease in her daughter and mother.    Allergies: Penicillins     Meds:   Current Outpatient Prescriptions on File Prior to Visit   Medication Sig Dispense Refill    ACCU-CHEK COMPACT TEST Strp       aspirin (ECOTRIN) 81 MG EC tablet Take 81 mg by mouth once daily.        atorvastatin (LIPITOR) 20 MG tablet TAKE 1 TABLET DAILY 90 tablet 3    BD INSULIN PEN NEEDLE UF MINI 31 gauge x 3/16" Ndle USE FOUR TIMES A DAY WITH MEALS AND NIGHTLY 400 each 3    diclofenac sodium 1 % Gel Apply 2 g topically once daily. 100 " "g 2    dulaglutide (TRULICITY) 1.5 mg/0.5 mL PnIj Inject 1.5 mg into the skin every 7 days. 4 Syringe 11    insulin glargine (LANTUS SOLOSTAR) 100 unit/mL (3 mL) InPn pen Inject 42 Units into the skin every evening. 3 Box 3    losartan-hydrochlorothiazide 50-12.5 mg (HYZAAR) 50-12.5 mg per tablet TAKE 1 TABLET DAILY 90 tablet 2    metformin (GLUCOPHAGE) 1000 MG tablet TAKE 1 TABLET TWICE A DAY WITH MEALS 180 tablet 2    multivitamin capsule Take 1 capsule by mouth once daily.        VITAMIN D2 50,000 unit capsule TAKE 1 CAPSULE ONCE A MONTH 3 capsule 2    ciclopirox (PENLAC) 8 % Soln Apply to toenails daily and remove every 3 days with alcohol and filing. 1 Bottle 3    dulaglutide (TRULICITY) 0.75 mg/0.5 mL PnIj Inject 0.75 mg into the skin once a week. 12 Syringe 2     No current facility-administered medications on file prior to visit.        Exam:  /83   Pulse 87   Ht 5' 4" (1.626 m)   Wt 77.8 kg (171 lb 8.3 oz)   BMI 29.44 kg/m²     Constitutional  Well-developed, well-nourished, appears stated age   Ophthalmoscopic  No papilledema with no hemorrhages or exudates bilaterally   Cardiovascular  Radial pulses 2+ and symmetric, no LE edema bilaterally   Neurological    * Mental status  MOCA deferred, partial     - Orientation  Oriented to person, place, time, and situation     - Memory   Sl off recent and remote, 5/5 --> 2/5 (5/5 with hints)     - Attention/concentration  Attentive, vigilant during exam, TEMI, 49-63-12-88-85, makes 41 cents     - Language  Naming & repetition intact, +2-step commands     - Fund of knowledge  Aware of current events     - Executive  Well-organized thoughts     - Other     * Cranial nerves       - CN II  PERRL, visual fields full to confrontation     - CN III, IV, VI  Extraocular movements full, normal pursuits and saccades     - CN V  Sensation V1 - V3 intact     - CN VII  Face strong and symmetric bilaterally     - CN VIII  Hearing intact bilaterally     - CN IX, " X  Palate raises midline and symmetric     - CN XI  SCM and trapezius 5/5 bilaterally     - CN XII  Tongue midline   * Motor  Muscle bulk normal, strength 5/5 throughout   * Sensory   Intact to temperature and vibration throughout   * Coordination  No dysmetria with finger-to-nose or heel-to-shin   * Gait  See below.   * Deep tendon reflexes  2+ and symmetric throughout   Babinski downgoing bilaterally   * Specialized movement exam  No hypophonic speech.    No facial masking.   No cogwheel rigidity.     No bradykinesia.   No tremor with rest, posture, kinesis, or intention.    No other dystonia, chorea, athetosis, myoclonus, or tics.   No motor impersistence.   Normal-based gait.   No shortened stride length.   No abnormal arm swing.     No postural instability.      Laboratory/Radiological:  - Results:  Lab Visit on 08/22/2017   Component Date Value Ref Range Status    Hemoglobin A1C 08/22/2017 8.1* 4.0 - 5.6 % Final    Estimated Avg Glucose 08/22/2017 186* 68 - 131 mg/dL Final    Sodium 08/22/2017 141  136 - 145 mmol/L Final    Potassium 08/22/2017 4.6  3.5 - 5.1 mmol/L Final    Chloride 08/22/2017 101  95 - 110 mmol/L Final    CO2 08/22/2017 29  23 - 29 mmol/L Final    Glucose 08/22/2017 128* 70 - 110 mg/dL Final    BUN, Bld 08/22/2017 15  8 - 23 mg/dL Final    Creatinine 08/22/2017 0.8  0.5 - 1.4 mg/dL Final    Calcium 08/22/2017 9.8  8.7 - 10.5 mg/dL Final    Total Protein 08/22/2017 7.2  6.0 - 8.4 g/dL Final    Albumin 08/22/2017 3.4* 3.5 - 5.2 g/dL Final    Total Bilirubin 08/22/2017 1.7* 0.1 - 1.0 mg/dL Final    Alkaline Phosphatase 08/22/2017 80  55 - 135 U/L Final    AST 08/22/2017 21  10 - 40 U/L Final    ALT 08/22/2017 21  10 - 44 U/L Final    Anion Gap 08/22/2017 11  8 - 16 mmol/L Final    eGFR if African American 08/22/2017 >60.0  >60 mL/min/1.73 m^2 Final    eGFR if non African American 08/22/2017 >60.0  >60 mL/min/1.73 m^2 Final     - Independent review of  "images:    Diagnoses:          Query vascular event/s (TIA versus perfusion including second visual attack), TGA or noctural seizure  History of head trauma, DM => may have been provoked.  Do NOT see any signs of dementia.    Medical Decision Making:  - "vascular events" - could be from low flow or little clot event (both the confusion and vision spells)  - "electrical event (seizure)" - like a shutdown and reboot  - "migraine - a combo of vascular and electrical" - sometimes without headache    Plan:  - EEG  - MRI brain  - MRA brain/neck (blood vessels)    Follow-up in 4-6 weeks.    Toy Briones MD, MPH  Division of Movement and Memory Disorders  [facility]  [phone]    "

## 2017-10-04 NOTE — LETTER
October 10, 2017      Carmel Irby, NP  [address]  [place] [address]           [facility]  [address]  [facility] [address]  Phone: [phone]  Fax: [phone]          Patient: Linda Fong   MR Number: [id number]   YOB: 1950   Date of Visit: 10/4/2017       Dear Carmel Irby:    Thank you for referring Lnida Fong to me for evaluation. Attached you will find relevant portions of my assessment and plan of care.    If you have questions, please do not hesitate to call me. I look forward to following Linda Fong along with you.    Sincerely,    Toy Briones MD    Enclosure  CC:  No Recipients    If you would like to receive this communication electronically, please contact [email or web address] or [phone] to request more information on [facility] Link access.    For providers and/or their staff who would like to refer a patient to [facility], please contact us through our one-stop-shop provider referral line, [facility], at [phone].    If you feel you have received this communication in error or would no longer like to receive these types of communications, please e-mail [email or web address]

## 2017-10-04 NOTE — PATIENT INSTRUCTIONS
"Possibilities:    - "vascular events" - could be from low flow or little clot event (both the confusion and vision spells)  - "electrical event (seizure)" - like a shutdown and reboot  - "migraine - a combo of vascular and electrical" - sometimes without headache    Plan:  - EEG  - MRI brain  - MRA brain/neck (blood vessels)    Follow-up in 4-6 weeks.    * kegel exercises for bladder control  * your vision loss is called a "scotoma"   "

## 2017-10-11 ENCOUNTER — HOSPITAL ENCOUNTER (OUTPATIENT)
Dept: NEUROLOGY | Facility: CLINIC | Age: 67
Discharge: HOME | End: 2017-10-11
Payer: MEDICARE

## 2017-10-11 DIAGNOSIS — R41.89 SPELL OF ALTERED COGNITION: ICD-10-CM

## 2017-10-11 PROCEDURE — 95812 EEG 41-60 MINUTES: CPT | Mod: PBBFAC | Performed by: PSYCHIATRY & NEUROLOGY

## 2017-10-12 ENCOUNTER — HOSPITAL ENCOUNTER (OUTPATIENT)
Dept: RADIOLOGY | Facility: HOSPITAL | Age: 67
Discharge: HOME | End: 2017-10-12
Attending: PSYCHIATRY & NEUROLOGY
Payer: MEDICARE

## 2017-10-12 DIAGNOSIS — R41.89 SPELL OF ALTERED COGNITION: ICD-10-CM

## 2017-10-12 DIAGNOSIS — I67.89 OTHER CEREBROVASCULAR DISEASE: ICD-10-CM

## 2017-10-12 PROCEDURE — 70553 MRI BRAIN STEM W/O & W/DYE: CPT | Mod: TC

## 2017-10-12 PROCEDURE — 95819 PR EEG,W/AWAKE & ASLEEP RECORD: ICD-10-PCS | Mod: 26,S$PBB,, | Performed by: PSYCHIATRY & NEUROLOGY

## 2017-10-12 PROCEDURE — 95819 EEG AWAKE AND ASLEEP: CPT | Mod: 26,S$PBB,, | Performed by: PSYCHIATRY & NEUROLOGY

## 2017-10-12 PROCEDURE — A9585 GADOBUTROL INJECTION: HCPCS | Performed by: PSYCHIATRY & NEUROLOGY

## 2017-10-12 PROCEDURE — 70548 MR ANGIOGRAPHY NECK W/DYE: CPT | Mod: TC

## 2017-10-12 PROCEDURE — 70544 MR ANGIOGRAPHY HEAD W/O DYE: CPT | Mod: TC

## 2017-10-12 PROCEDURE — 70553 MRI BRAIN STEM W/O & W/DYE: CPT | Mod: 26,,, | Performed by: RADIOLOGY

## 2017-10-12 PROCEDURE — 70548 MR ANGIOGRAPHY NECK W/DYE: CPT | Mod: 26,,, | Performed by: RADIOLOGY

## 2017-10-12 PROCEDURE — 25500020 PHARM REV CODE 255: Performed by: PSYCHIATRY & NEUROLOGY

## 2017-10-12 RX ORDER — GADOBUTROL 604.72 MG/ML
10 INJECTION INTRAVENOUS
Status: COMPLETED | OUTPATIENT
Start: 2017-10-12 | End: 2017-10-12

## 2017-10-12 RX ADMIN — GADOBUTROL 10 ML: 604.72 INJECTION INTRAVENOUS at 12:10

## 2017-10-12 NOTE — PROCEDURES
DATE OF PROCEDURE:  10/12/2017    EEG #:  OC-.    REFERRING PHYSICIAN:  Dr. Briones.    This EEG was performed to assess for evidence of underlying epilepsy.    ELECTROENCEPHALOGRAM REPORT     METHODOLOGY:  Electroencephalographic (EEG) recording is recorded with   electrodes placed according to the International 10-20 placement system.  Thirty   two (32) channels of digital signal (sampling rate of 512/sec), including T1   and T2, were simultaneously recorded from the scalp and may include EKG, EMG,   and/or eye monitors.  Recording band pass was 0.1 to 512 Hz.  Digital video   recording of the patient is simultaneously recorded with the EEG.  The patient   is instructed to report clinical symptoms which may occur during the recording   session.  EEG and video recording are stored and archived in digital format.    Activation procedures, which include photic stimulation, hyperventilation and   instructing patients to perform simple tasks, are done in selected patients  The EEG is displayed on a monitor screen and can be reviewed using different   montages.  Computer assisted-analysis is employed to detect spike and   electrographic seizure activity.   The entire record is submitted for computer   analysis.  The entire recording is visually reviewed, and the times identified   by computer analysis as being spikes or seizures are reviewed again.    Compressed spectral analysis (CSA) is also performed on the activity recorded   from each individual channel.  This is displayed as a power display of   frequencies from 0 to 30 Hz over time.   The CSA is reviewed looking for   asymmetries in power between homologous areas of the scalp, then compared with   the original EEG recording.    [employer] software was also utilized in the review of this study.  This software   suite analyzes the EEG recording in multiple domains.  Coherence and rhythmicity   are computed to identify EEG sections which may contain organized  seizures.    Each channel undergoes analysis to detect the presence of spike and sharp waves   which have special and morphological characteristics of epileptic activity.  The   routine EEG recording is converted from special into frequency domain.  This is   then displayed comparing homologous areas to identify areas of significant   asymmetry.  Algorithm to identify non-cortically generated artifact is used to   separate artifact from the EEG.      EEG FINDINGS:  The recording was obtained with a number of standard bipolar and   referential montages during wakefulness, drowsiness and sleep.  In the alert   state, the posterior background rhythm was a symmetric, well-modulated 9 to 10   Hz alpha rhythm, which reacted symmetrically to eye opening.  Intermittent   photic stimulation evoked symmetric posterior driving responses.    Hyperventilation produced physiological slowing.  No abnormalities were   activated by photic stimulation or hyperventilation.  During drowsiness, the   background rhythm waxed and waned and there were periods of slowing.  During   stage II sleep, symmetric V waves, K complexes and sleep spindles were noted.    There were no focal abnormalities.  There were no interictal epileptiform   abnormalities and no clinical or electrographic seizures were recorded.  Of   note, the patient's sleep was fragmented.    The EKG channel revealed sinus rhythm.    IMPRESSION:  This is a normal EEG during wakefulness, drowsiness and sleep.    CLINICAL CORRELATION:  The patient is a 67-year-old female who is being   evaluated for episodes of confusion.  The patient is currently not maintained on   any anti-seizure medications.  This is a normal EEG during wakefulness,   drowsiness and sleep.  There is no evidence for either cortical dysfunction nor   an epileptic process on this recording.  No seizures were recorded during this   study.  Of note, the patient's sleep was significantly fragmented, which may    contribute to nonspecific cognitive symptoms.  Further correlation with a sleep   study is recommended.      FAK/HN  dd: 10/12/2017 14:40:35 (CDT)  td: 10/12/2017 14:56:02 (CDT)  Doc ID   #[id number]  Job ID #[id number]    CC:

## 2017-11-09 ENCOUNTER — OFFICE VISIT (OUTPATIENT)
Dept: NEUROLOGY | Facility: CLINIC | Age: 67
End: 2017-11-09
Payer: MEDICARE

## 2017-11-09 VITALS
SYSTOLIC BLOOD PRESSURE: 130 MMHG | HEIGHT: 64 IN | BODY MASS INDEX: 29.24 KG/M2 | DIASTOLIC BLOOD PRESSURE: 79 MMHG | HEART RATE: 96 BPM | WEIGHT: 171.31 LBS

## 2017-11-09 DIAGNOSIS — G45.4 TGA (TRANSIENT GLOBAL AMNESIA): Primary | ICD-10-CM

## 2017-11-09 PROCEDURE — 99213 OFFICE O/P EST LOW 20 MIN: CPT | Mod: PBBFAC | Performed by: PSYCHIATRY & NEUROLOGY

## 2017-11-09 PROCEDURE — 99999 PR PBB SHADOW E&M-EST. PATIENT-LVL III: CPT | Mod: PBBFAC,,, | Performed by: PSYCHIATRY & NEUROLOGY

## 2017-11-09 PROCEDURE — 99213 OFFICE O/P EST LOW 20 MIN: CPT | Mod: S$PBB,,, | Performed by: PSYCHIATRY & NEUROLOGY

## 2017-11-09 NOTE — LETTER
November 13, 2017      Manuel Yanes MD  [address]  [facility] [address]           [facility]  [address]  [facility] [address]  Phone: [phone]  Fax: [phone]          Patient: Linda Fong   MR Number: [id number]   YOB: 1950   Date of Visit: 11/9/2017       Dear Dr. Manuel Yanes:    Thank you for referring Linda Fong to me for evaluation. Attached you will find relevant portions of my assessment and plan of care.    If you have questions, please do not hesitate to call me. I look forward to following Linda Fong along with you.    Sincerely,    Jagdeep Reyes  CC:  No Recipients    If you would like to receive this communication electronically, please contact [email or web address] or [phone] to request more information on [email or web address] Link access.    For providers and/or their staff who would like to refer a patient to [facility], please contact us through our one-stop-shop provider referral line, [facility], at [phone].    If you feel you have received this communication in error or would no longer like to receive these types of communications, please e-mail [email or web address]

## 2017-11-09 NOTE — PROGRESS NOTES
"Name: Linda Fong  MRN: [id number]   CSN: [id number]      Date: 2017    Referring physician:  Manuel Yanes MD  [address] [place] [address]    Chief Complaint / Interval History:   - reviewed results of studies  - no new spells    Since last visit, son and oldest granddaughter have had worsening migraine headaches.      History of Present Illness (HPI):    On , awoke and "felt confused".  Thinks awoke a little later than usual.  Usual night before, except she had been having more recent stressors (financially - having issues with the checkbook, worried about expenses).  Felt disoriented, "knew something was different".  Recognized herself and , and knew it was her house.  But looking at the checkbook, she couldn't figure it out.  Felt like words were not recognizable.  Did feel strong, no numbness.  Was afraid to drive.    The spell lasted for "a couple of days".  Didn't tell her  (Jaiosn).  Returned to her baseline then.  Feels about "normal" for age, except some mild baseline word-finding difficulties and some short-term issues (though can usually be reminded well).    On , she had "ocular migraine".  Was just sitting and talking to Jaison and there was a "bright light" in the center of her vision with both eyes open.  Did NOT think it was along one side of her meridian or the other.  Lasted for 30 minutes.  Had no headache.  On , optometry proposed ocular migraine.    Fell once in , anosmia since then.  Thinks it showed up on a scan.       50+ years!  Have three adults kids, nine grands.  Grandfather  in his 50s (presumed heart disease and skin disease), father had multiple strokes in his 70s.    Has insulin dependent diabetes.  Feels it is under good control.    Nonmotor/Premotor ROS:  Hyposmia (HENT)?Yes  RBD/sleep issues (Constitutional)?No  Depression/anxiety (Psychiatric)?No  Fatigue (Constitutional)?No  Constipation (GI)?No  Urinary " "issues ()?Yes - some stress incontinence, but usually can hold it  Sexual dysfunction ()?No - appropiate  Orthostasis (Cardiovascular)?No  Leg swelling (Cardiovascular)? Yes - left ankle is a bit more swollen, comes and goes  Falls (Musculoskeletal)?Yes - feels "her ear rocks get out of place" and she does the Epley maneuver  Cognitive impairment (Neurologic)?Yes  Psychoses (Psychiatric)?No  Pain/Paresthesia (Neurologic)?No  Visual changes (Eyes)?No  Moles / skin changes (Skin)?No  Stridor / SOB (Pulm)?No  Bruising (Heme)?No    Past Medical History: The patient  has a past medical history of Diabetic nephropathy; DJD (degenerative joint disease); Goiter; HTN (hypertension); Hyperlipidemia; Intracranial bleeding; Osteopenia; PN (peripheral neuropathy); Traumatic brain injury (Dec 2010); and Type II or unspecified type diabetes mellitus with renal manifestations, uncontrolled(250.42).    Social History: The patient  reports that she has never smoked. She has never used smokeless tobacco. She reports that she does not drink alcohol or use drugs.    Family History: Their family history includes Breast cancer in her mother; Cancer in her mother; Cataracts in her father; Diabetes in her father, maternal grandmother, mother, and paternal grandmother; Hypertension in her father, maternal grandfather, maternal grandmother, mother, paternal grandfather, and paternal grandmother; Stroke in her father, maternal grandfather, maternal grandmother, mother, paternal grandfather, and paternal grandmother; Thyroid disease in her daughter and mother.    Allergies: Penicillins     Meds:   Current Outpatient Prescriptions on File Prior to Visit   Medication Sig Dispense Refill    ACCU-CHEK COMPACT TEST Strp       aspirin (ECOTRIN) 81 MG EC tablet Take 81 mg by mouth once daily.        atorvastatin (LIPITOR) 20 MG tablet TAKE 1 TABLET DAILY 90 tablet 3    BD INSULIN PEN NEEDLE UF MINI 31 gauge x 3/16" Ndle USE FOUR TIMES A DAY " "WITH MEALS AND NIGHTLY 400 each 3    dulaglutide (TRULICITY) 0.75 mg/0.5 mL PnIj Inject 0.75 mg into the skin once a week. 12 Syringe 2    dulaglutide (TRULICITY) 1.5 mg/0.5 mL PnIj Inject 1.5 mg into the skin every 7 days. 4 Syringe 11    insulin glargine (LANTUS SOLOSTAR) 100 unit/mL (3 mL) InPn pen Inject 42 Units into the skin every evening. 3 Box 3    losartan-hydrochlorothiazide 50-12.5 mg (HYZAAR) 50-12.5 mg per tablet TAKE 1 TABLET DAILY 90 tablet 2    metformin (GLUCOPHAGE) 1000 MG tablet TAKE 1 TABLET TWICE A DAY WITH MEALS 180 tablet 2    multivitamin capsule Take 1 capsule by mouth once daily.        VITAMIN D2 50,000 unit capsule TAKE 1 CAPSULE ONCE A MONTH 3 capsule 2    ciclopirox (PENLAC) 8 % Soln Apply to toenails daily and remove every 3 days with alcohol and filing. 1 Bottle 3    diclofenac sodium 1 % Gel Apply 2 g topically once daily. 100 g 2     No current facility-administered medications on file prior to visit.        Exam:  /79   Pulse 96   Ht 5' 4" (1.626 m)   Wt 77.7 kg (171 lb 4.8 oz)   BMI 29.40 kg/m²     Constitutional  Well-developed, well-nourished, appears stated age   Ophthalmoscopic  No papilledema with no hemorrhages or exudates bilaterally   Cardiovascular  Radial pulses 2+ and symmetric, no LE edema bilaterally   Neurological    * Mental status  MOCA deferred, partial     - Orientation  Oriented to person, place, time, and situation     - Memory   Sl off recent and remote, 5/5 --> 2/5 (5/5 with hints)     - Attention/concentration  Attentive, vigilant during exam, TEMI, 30-44-80-88-85, makes 41 cents     - Language  Naming & repetition intact, +2-step commands     - Fund of knowledge  Aware of current events     - Executive  Well-organized thoughts     - Other     * Cranial nerves       - CN II  PERRL, visual fields full to confrontation     - CN III, IV, VI  Extraocular movements full, normal pursuits and saccades     - CN V  Sensation V1 - V3 intact     - " CN VII  Face strong and symmetric bilaterally     - CN VIII  Hearing intact bilaterally     - CN IX, X  Palate raises midline and symmetric     - CN XI  SCM and trapezius 5/5 bilaterally     - CN XII  Tongue midline   * Motor  Muscle bulk normal, strength 5/5 throughout   * Sensory   Intact to temperature and vibration throughout   * Coordination  No dysmetria with finger-to-nose or heel-to-shin   * Gait  See below.   * Deep tendon reflexes  2+ and symmetric throughout   Babinski downgoing bilaterally   * Specialized movement exam  No hypophonic speech.    No facial masking.   No cogwheel rigidity.     No bradykinesia.   No tremor with rest, posture, kinesis, or intention.    No other dystonia, chorea, athetosis, myoclonus, or tics.   No motor impersistence.   Normal-based gait.   No shortened stride length.   No abnormal arm swing.     No postural instability.      Laboratory/Radiological:  - Results:  Lab Visit on 08/22/2017   Component Date Value Ref Range Status    Hemoglobin A1C 08/22/2017 8.1* 4.0 - 5.6 % Final    Estimated Avg Glucose 08/22/2017 186* 68 - 131 mg/dL Final    Sodium 08/22/2017 141  136 - 145 mmol/L Final    Potassium 08/22/2017 4.6  3.5 - 5.1 mmol/L Final    Chloride 08/22/2017 101  95 - 110 mmol/L Final    CO2 08/22/2017 29  23 - 29 mmol/L Final    Glucose 08/22/2017 128* 70 - 110 mg/dL Final    BUN, Bld 08/22/2017 15  8 - 23 mg/dL Final    Creatinine 08/22/2017 0.8  0.5 - 1.4 mg/dL Final    Calcium 08/22/2017 9.8  8.7 - 10.5 mg/dL Final    Total Protein 08/22/2017 7.2  6.0 - 8.4 g/dL Final    Albumin 08/22/2017 3.4* 3.5 - 5.2 g/dL Final    Total Bilirubin 08/22/2017 1.7* 0.1 - 1.0 mg/dL Final    Alkaline Phosphatase 08/22/2017 80  55 - 135 U/L Final    AST 08/22/2017 21  10 - 40 U/L Final    ALT 08/22/2017 21  10 - 44 U/L Final    Anion Gap 08/22/2017 11  8 - 16 mmol/L Final    eGFR if African American 08/22/2017 >60.0  >60 mL/min/1.73 m^2 Final    eGFR if non African  American 08/22/2017 >60.0  >60 mL/min/1.73 m^2 Final     - Independent review of images:    Diagnoses:          1) Spell of confusion, may have been a migrainous equivalent in the setting structural brain agnml    Plan:  - conservative management  - secondary stroke preevention already on boardw ith asa, statin, arb, and DM management  - could consider TPM as preventive for migraine/seizure, but HA u nder control and this single event is still not convincing for seizure    Toy Briones MD, MPH  Division of Movement and Memory Disorders  [facility]  [phone]

## 2017-11-16 ENCOUNTER — PATIENT MESSAGE (OUTPATIENT)
Dept: INTERNAL MEDICINE | Facility: CLINIC | Age: 67
End: 2017-11-16

## 2017-11-21 ENCOUNTER — LAB VISIT (OUTPATIENT)
Dept: LAB | Facility: HOSPITAL | Age: 67
End: 2017-11-21
Payer: MEDICARE

## 2017-11-21 DIAGNOSIS — E11.42 DIABETIC PERIPHERAL NEUROPATHY ASSOCIATED WITH TYPE 2 DIABETES MELLITUS: ICD-10-CM

## 2017-11-21 LAB
ESTIMATED AVG GLUCOSE: 183 MG/DL
HBA1C MFR BLD HPLC: 8 %

## 2017-11-21 PROCEDURE — 36415 COLL VENOUS BLD VENIPUNCTURE: CPT

## 2017-11-21 PROCEDURE — 83036 HEMOGLOBIN GLYCOSYLATED A1C: CPT

## 2017-12-01 ENCOUNTER — OFFICE VISIT (OUTPATIENT)
Dept: ENDOCRINOLOGY | Facility: CLINIC | Age: 67
End: 2017-12-01
Payer: MEDICARE

## 2017-12-01 VITALS
HEART RATE: 86 BPM | WEIGHT: 169.31 LBS | SYSTOLIC BLOOD PRESSURE: 110 MMHG | DIASTOLIC BLOOD PRESSURE: 64 MMHG | HEIGHT: 64 IN | BODY MASS INDEX: 28.91 KG/M2

## 2017-12-01 DIAGNOSIS — E78.5 HYPERLIPIDEMIA ASSOCIATED WITH TYPE 2 DIABETES MELLITUS: ICD-10-CM

## 2017-12-01 DIAGNOSIS — E11.42 DIABETIC PERIPHERAL NEUROPATHY ASSOCIATED WITH TYPE 2 DIABETES MELLITUS: Primary | ICD-10-CM

## 2017-12-01 DIAGNOSIS — E11.59 HYPERTENSION ASSOCIATED WITH DIABETES: ICD-10-CM

## 2017-12-01 DIAGNOSIS — I15.2 HYPERTENSION ASSOCIATED WITH DIABETES: ICD-10-CM

## 2017-12-01 DIAGNOSIS — E11.69 HYPERLIPIDEMIA ASSOCIATED WITH TYPE 2 DIABETES MELLITUS: ICD-10-CM

## 2017-12-01 PROCEDURE — 99999 PR PBB SHADOW E&M-EST. PATIENT-LVL III: CPT | Mod: PBBFAC,,, | Performed by: NURSE PRACTITIONER

## 2017-12-01 PROCEDURE — 99213 OFFICE O/P EST LOW 20 MIN: CPT | Mod: PBBFAC | Performed by: NURSE PRACTITIONER

## 2017-12-01 PROCEDURE — 99214 OFFICE O/P EST MOD 30 MIN: CPT | Mod: S$PBB,,, | Performed by: NURSE PRACTITIONER

## 2017-12-01 RX ORDER — GLIMEPIRIDE 1 MG/1
1 TABLET ORAL
Qty: 90 TABLET | Refills: 3 | Status: SHIPPED | OUTPATIENT
Start: 2017-12-01 | End: 2018-05-31

## 2017-12-01 NOTE — PROGRESS NOTES
"CC: Patient is here for management of Type 2 diabetes and review of medical conditions as listed in visit diagnosis.      HPI: Ms. Linda Fong is a 67 y.o. female who was diagnosed with Type 2 in 1993. She has never been hospitalized r/t DM. She suffered a ICH after a fall years ago and has had decreased memory since then.Denies personal history of pancreatitis or pancreatic cancer. Denies family hx of thyroid cancer.      Trulicity increased to 1.5 mg at last visit.     A1C remains uncontrolled.     Checks BG once daily, fasting only. States readings are 140s average, down to 120s.     No hypoglycemia.     No missed doses of diabetes medications.     Good appetite, but reports she has cut back.     Not currently exercising, but has plans to restart.     CURRENT DIABETIC MEDS: Lantus 42 units nightly, Metformin 1,000 mg bid, Trulicity 1.5 mg weekly  Uses Vials or Pens: Pens  Type of Glucose Meter: Accu-Chek Compact    Last Eye Exam: September 2017  Last Podiatry Exam: 2015    REVIEW OF SYSTEMS  General: no weakness or fatigue.   Eyes: no blurry vision or eye pain.    Cardiac: no chest pain or palpitations.   Respiratory: no cough or dyspnea.   GI: no abdominal pain or nausea.   Skin: no rashes, wounds, or insulin injection site reactions.   Neuro: no numbness or tingling; no dizziness  Endocrine: no polyuria, polydipsia, polyphagia.   Psych: sleeping well, no anxiety or depression.   MS: chronic right hip, lower back pain at times.     Vital Signs  /64 (BP Location: Left arm, Patient Position: Sitting, BP Method: Medium (Manual))   Pulse 86   Ht 5' 4" (1.626 m)   Wt 76.8 kg (169 lb 5 oz)   BMI 29.06 kg/m²     Hemoglobin A1C   Date Value Ref Range Status   11/21/2017 8.0 (H) 4.0 - 5.6 % Final     Comment:     According to ADA guidelines, hemoglobin A1c <7.0% represents  optimal control in non-pregnant diabetic patients. Different  metrics may apply to specific patient populations.   Standards of Medical " Care in Diabetes-2016.  For the purpose of screening for the presence of diabetes:  <5.7%     Consistent with the absence of diabetes  5.7-6.4%  Consistent with increasing risk for diabetes   (prediabetes)  >or=6.5%  Consistent with diabetes  Currently, no consensus exists for use of hemoglobin A1c  for diagnosis of diabetes for children.  This Hemoglobin A1c assay has significant interference with fetal   hemoglobin   (HbF). The results are invalid for patients with abnormal amounts of   HbF,   including those with known Hereditary Persistence   of Fetal Hemoglobin. Heterozygous hemoglobin variants (HbAS, HbAC,   HbAD, HbAE, HbA2) do not significantly interfere with this assay;   however, presence of multiple variants in a sample may impact the %   interference.     08/22/2017 8.1 (H) 4.0 - 5.6 % Final     Comment:     According to ADA guidelines, hemoglobin A1c <7.0% represents  optimal control in non-pregnant diabetic patients. Different  metrics may apply to specific patient populations.   Standards of Medical Care in Diabetes-2016.  For the purpose of screening for the presence of diabetes:  <5.7%     Consistent with the absence of diabetes  5.7-6.4%  Consistent with increasing risk for diabetes   (prediabetes)  >or=6.5%  Consistent with diabetes  Currently, no consensus exists for use of hemoglobin A1c  for diagnosis of diabetes for children.  This Hemoglobin A1c assay has significant interference with fetal   hemoglobin   (HbF). The results are invalid for patients with abnormal amounts of   HbF,   including those with known Hereditary Persistence   of Fetal Hemoglobin. Heterozygous hemoglobin variants (HbAS, HbAC,   HbAD, HbAE, HbA2) do not significantly interfere with this assay;   however, presence of multiple variants in a sample may impact the %   interference.     05/15/2017 8.5 (H) 4.5 - 6.2 % Final     Comment:     According to ADA guidelines, hemoglobin A1C <7.0% represents  optimal control in  non-pregnant diabetic patients.  Different  metrics may apply to specific populations.   Standards of Medical Care in Diabetes - 2016.  For the purpose of screening for the presence of diabetes:  <5.7%     Consistent with the absence of diabetes  5.7-6.4%  Consistent with increasing risk for diabetes   (prediabetes)  >or=6.5%  Consistent with diabetes  Currently no consensus exists for use of hemoglobin A1C  for diagnosis of diabetes for children.         Chemistry        Component Value Date/Time     08/22/2017 1014    K 4.6 08/22/2017 1014     08/22/2017 1014    CO2 29 08/22/2017 1014    BUN 15 08/22/2017 1014    CREATININE 0.8 08/22/2017 1014     (H) 08/22/2017 1014        Component Value Date/Time    CALCIUM 9.8 08/22/2017 1014    ALKPHOS 80 08/22/2017 1014    AST 21 08/22/2017 1014    ALT 21 08/22/2017 1014    BILITOT 1.7 (H) 08/22/2017 1014          Lab Results   Component Value Date    CHOL 142 05/15/2017    CHOL 151 02/16/2016    CHOL 152 03/17/2015     Lab Results   Component Value Date    HDL 49 05/15/2017    HDL 52 02/16/2016    HDL 52 03/17/2015     Lab Results   Component Value Date    LDLCALC 64.2 05/15/2017    LDLCALC 69.0 02/16/2016    LDLCALC 74.4 03/17/2015     Lab Results   Component Value Date    TRIG 144 05/15/2017    TRIG 150 02/16/2016    TRIG 128 03/17/2015     Lab Results   Component Value Date    CHOLHDL 34.5 05/15/2017    CHOLHDL 34.4 02/16/2016    CHOLHDL 34.2 03/17/2015     Lab Results   Component Value Date    TSH 4.408 (H) 05/15/2017     Lab Results   Component Value Date    MICALBCREAT 20.0 02/10/2017       PHYSICAL EXAMINATION  Constitutional: Appears well, no distress  Neck: Supple, trachea midline.   Respiratory: CTA without wheezes, even and unlabored.  Cardiovascular: RRR, S1 and S2 with no murmurs or carotid bruits.  Lymph: no edema.   Skin: warm and dry; no insulin site reactions observed.  Neuro: patient alert and cooperative.  Feet: appropriate footwear;  see foot exam from note dated 8/28/17    Assessment and Plan    Diabetic peripheral neuropathy associated with Type 2 DM  DM uncontrolled  Continue current doses as documented above  Add in Glimepiride 1 mg daily  Monitor 2x/daily and bring logs to next visit    Hypertension  BP at goal  Continue Losartan-HCTZ    Hyperlipidemia  Lipids stable  Continue Atorvastatin 20 mg daily        FOLLOW UP  Return in about 6 months (around 6/1/2018).

## 2018-01-03 ENCOUNTER — OFFICE VISIT (OUTPATIENT)
Dept: DERMATOLOGY | Facility: CLINIC | Age: 68
End: 2018-01-03
Payer: MEDICARE

## 2018-01-03 DIAGNOSIS — L57.0 AK (ACTINIC KERATOSIS): Primary | ICD-10-CM

## 2018-01-03 DIAGNOSIS — L82.1 SK (SEBORRHEIC KERATOSIS): ICD-10-CM

## 2018-01-03 DIAGNOSIS — D22.9 NEVUS: ICD-10-CM

## 2018-01-03 DIAGNOSIS — D18.00 ANGIOMA: ICD-10-CM

## 2018-01-03 DIAGNOSIS — L81.4 LENTIGO: ICD-10-CM

## 2018-01-03 PROCEDURE — 17000 DESTRUCT PREMALG LESION: CPT | Mod: S$PBB,,, | Performed by: DERMATOLOGY

## 2018-01-03 PROCEDURE — 99212 OFFICE O/P EST SF 10 MIN: CPT | Mod: PBBFAC,PO,25 | Performed by: DERMATOLOGY

## 2018-01-03 PROCEDURE — 17000 DESTRUCT PREMALG LESION: CPT | Mod: PBBFAC,PO | Performed by: DERMATOLOGY

## 2018-01-03 PROCEDURE — 99203 OFFICE O/P NEW LOW 30 MIN: CPT | Mod: 25,S$PBB,, | Performed by: DERMATOLOGY

## 2018-01-03 PROCEDURE — 99999 PR PBB SHADOW E&M-EST. PATIENT-LVL II: CPT | Mod: PBBFAC,,, | Performed by: DERMATOLOGY

## 2018-01-03 NOTE — PROGRESS NOTES
"  Subjective:       Patient ID:  Linda Fong is a 67 y.o. female who presents for   Chief Complaint   Patient presents with    Lesion     Pt here today for a TBSE. Pt c/o brown lesions on arms x a few years. No bleeding or pain. NO pre vtx.    Patient is here today for a "mole" check.   Pt has a history of normal sun exposure in the past.   Pt recalls several blistering sunburns in the past- no  Pt has history of tanning bed use- no  Pt has had moles removed in the past- no  Pt has history of melanoma in first degree relatives- no          Lesion         Review of Systems   Skin: Positive for activity-related sunscreen use. Negative for daily sunscreen use and recent sunburn.   Hematologic/Lymphatic: Does not bruise/bleed easily.        Objective:    Physical Exam   Constitutional: She appears well-developed and well-nourished. No distress.   Neurological: She is alert and oriented to person, place, and time. She is not disoriented.   Psychiatric: She has a normal mood and affect.   Skin:   Areas Examined (abnormalities noted in diagram):   Scalp / Hair Palpated and Inspected  Head / Face Inspection Performed  Neck Inspection Performed  Chest / Axilla Inspection Performed  Abdomen Inspection Performed  Genitals / Buttocks / Groin Inspection Performed  Back Inspection Performed  RUE Inspected  LUE Inspection Performed  RLE Inspected  LLE Inspection Performed  Nails and Digits Inspection Performed                   Diagram Legend     Erythematous scaling macule/papule c/w actinic keratosis       Vascular papule c/w angioma      Pigmented verrucoid papule/plaque c/w seborrheic keratosis      Yellow umbilicated papule c/w sebaceous hyperplasia      Irregularly shaped tan macule c/w lentigo     1-2 mm smooth white papules consistent with Milia      Movable subcutaneous cyst with punctum c/w epidermal inclusion cyst      Subcutaneous movable cyst c/w pilar cyst      Firm pink to brown papule c/w dermatofibroma      " Pedunculated fleshy papule(s) c/w skin tag(s)      Evenly pigmented macule c/w junctional nevus     Mildly variegated pigmented, slightly irregular-bordered macule c/w mildly atypical nevus      Flesh colored to evenly pigmented papule c/w intradermal nevus       Pink pearly papule/plaque c/w basal cell carcinoma      Erythematous hyperkeratotic cursted plaque c/w SCC      Surgical scar with no sign of skin cancer recurrence      Open and closed comedones      Inflammatory papules and pustules      Verrucoid papule consistent consistent with wart     Erythematous eczematous patches and plaques     Dystrophic onycholytic nail with subungual debris c/w onychomycosis     Umbilicated papule    Erythematous-base heme-crusted tan verrucoid plaque consistent with inflamed seborrheic keratosis     Erythematous Silvery Scaling Plaque c/w Psoriasis     See annotation      Assessment / Plan:        AK (actinic keratosis)  Cryosurgery Procedure Note    Verbal consent from the patient is obtained and the patient is aware of the precancerous quality and need for treatment of these lesions. Liquid nitrogen cryosurgery is applied to the 1 actinic keratoses, as detailed in the physical exam, to produce a freeze injury. The patient is aware that blisters may form and is instructed on wound care with gentle cleansing and use of vaseline ointment to keep moist until healed. The patient is supplied a handout on cryosurgery and is instructed to call if lesions do not completely resolve.    Lentigo  This is a benign hyperpigmented sun induced lesion. Daily sun protection will reduce the number of new lesions. Treatment of these benign lesions are considered cosmetic.  The nature of sun-induced photo-aging and skin cancers is discussed.  Sun avoidance, protective clothing, and the use of 30-SPF sunscreens is advised. Observe closely for skin damage/changes, and call if such occurs.    SK (seborrheic keratosis)  These are benign inherited  growths without a malignant potential. Reassurance given to patient. No treatment is necessary.     Angioma  These are benign vascular lesions that are inherited.  Treatment is not necessary.    Nevus  Discussed ABCDE's of nevi.  Monitor for new mole or moles that are becoming bigger, darker, irritated, or developing irregular borders. Brochure provided.        Total body skin examination performed today including at least 12 points as noted in physical examination. No lesions suspicious for malignancy noted.             Return in about 2 years (around 1/3/2020).

## 2018-03-02 RX ORDER — LOSARTAN POTASSIUM AND HYDROCHLOROTHIAZIDE 12.5; 5 MG/1; MG/1
TABLET ORAL
Qty: 90 TABLET | Refills: 2 | Status: SHIPPED | OUTPATIENT
Start: 2018-03-02 | End: 2018-10-22 | Stop reason: SDUPTHER

## 2018-03-22 RX ORDER — ATORVASTATIN CALCIUM 20 MG/1
TABLET, FILM COATED ORAL
Qty: 90 TABLET | Refills: 3 | OUTPATIENT
Start: 2018-03-22

## 2018-03-22 RX ORDER — METFORMIN HYDROCHLORIDE 1000 MG/1
TABLET ORAL
Qty: 180 TABLET | Refills: 2 | Status: CANCELLED | OUTPATIENT
Start: 2018-03-22

## 2018-03-29 ENCOUNTER — TELEPHONE (OUTPATIENT)
Dept: PHARMACY | Facility: CLINIC | Age: 68
End: 2018-03-29

## 2018-03-29 NOTE — TELEPHONE ENCOUNTER
Patient was given a voucher for 1 box of Trulicity at no cost.  Patient is coming in on 4/2/2018 to sign application for pharmacy Patient Assistance

## 2018-05-22 ENCOUNTER — LAB VISIT (OUTPATIENT)
Dept: LAB | Facility: HOSPITAL | Age: 68
End: 2018-05-22
Payer: MEDICARE

## 2018-05-22 DIAGNOSIS — E11.42 DIABETIC PERIPHERAL NEUROPATHY ASSOCIATED WITH TYPE 2 DIABETES MELLITUS: ICD-10-CM

## 2018-05-22 LAB
ESTIMATED AVG GLUCOSE: 203 MG/DL
HBA1C MFR BLD HPLC: 8.7 %
T4 FREE SERPL-MCNC: 1.01 NG/DL
TSH SERPL DL<=0.005 MIU/L-ACNC: 4.51 UIU/ML

## 2018-05-22 PROCEDURE — 83036 HEMOGLOBIN GLYCOSYLATED A1C: CPT

## 2018-05-22 PROCEDURE — 84443 ASSAY THYROID STIM HORMONE: CPT

## 2018-05-22 PROCEDURE — 84439 ASSAY OF FREE THYROXINE: CPT

## 2018-05-22 PROCEDURE — 36415 COLL VENOUS BLD VENIPUNCTURE: CPT

## 2018-05-31 ENCOUNTER — OFFICE VISIT (OUTPATIENT)
Dept: ENDOCRINOLOGY | Facility: CLINIC | Age: 68
End: 2018-05-31
Payer: MEDICARE

## 2018-05-31 VITALS
HEART RATE: 64 BPM | RESPIRATION RATE: 16 BRPM | WEIGHT: 170.19 LBS | BODY MASS INDEX: 29.06 KG/M2 | HEIGHT: 64 IN | SYSTOLIC BLOOD PRESSURE: 114 MMHG | DIASTOLIC BLOOD PRESSURE: 76 MMHG

## 2018-05-31 DIAGNOSIS — Z79.4 TYPE 2 DIABETES MELLITUS WITH DIABETIC POLYNEUROPATHY, WITH LONG-TERM CURRENT USE OF INSULIN: Primary | ICD-10-CM

## 2018-05-31 DIAGNOSIS — E55.9 VITAMIN D DEFICIENCY DISEASE: ICD-10-CM

## 2018-05-31 DIAGNOSIS — E11.69 HYPERLIPIDEMIA ASSOCIATED WITH TYPE 2 DIABETES MELLITUS: ICD-10-CM

## 2018-05-31 DIAGNOSIS — E11.42 DIABETIC PERIPHERAL NEUROPATHY ASSOCIATED WITH TYPE 2 DIABETES MELLITUS: ICD-10-CM

## 2018-05-31 DIAGNOSIS — E11.42 TYPE 2 DIABETES MELLITUS WITH DIABETIC POLYNEUROPATHY, WITH LONG-TERM CURRENT USE OF INSULIN: Primary | ICD-10-CM

## 2018-05-31 DIAGNOSIS — I15.2 HYPERTENSION ASSOCIATED WITH DIABETES: ICD-10-CM

## 2018-05-31 DIAGNOSIS — E78.5 HYPERLIPIDEMIA ASSOCIATED WITH TYPE 2 DIABETES MELLITUS: ICD-10-CM

## 2018-05-31 DIAGNOSIS — E11.59 HYPERTENSION ASSOCIATED WITH DIABETES: ICD-10-CM

## 2018-05-31 PROCEDURE — 99214 OFFICE O/P EST MOD 30 MIN: CPT | Mod: PBBFAC | Performed by: NURSE PRACTITIONER

## 2018-05-31 PROCEDURE — 99999 PR PBB SHADOW E&M-EST. PATIENT-LVL IV: CPT | Mod: PBBFAC,,, | Performed by: NURSE PRACTITIONER

## 2018-05-31 PROCEDURE — 99214 OFFICE O/P EST MOD 30 MIN: CPT | Mod: S$PBB,,, | Performed by: NURSE PRACTITIONER

## 2018-05-31 RX ORDER — GLIMEPIRIDE 2 MG/1
2 TABLET ORAL
Qty: 90 TABLET | Refills: 3 | Status: SHIPPED | OUTPATIENT
Start: 2018-05-31 | End: 2019-03-04

## 2018-05-31 NOTE — Clinical Note
Previously seen by Clarence 12/2017 TSH slightly high but Free T4 normal.  She is 67y/o  + family hx of thyroid CA - sister Intermittently c/o hoarseness  Recommendations ?

## 2018-05-31 NOTE — PROGRESS NOTES
CC: Patient is here for management of Type 2 diabetes and review of medical conditions as listed in visit diagnosis.      HPI: Ms. Linda Fong is a 68 y.o. female who was diagnosed with Type 2 in 1993, on employment physical exam with labs. She has never been hospitalized r/t DM. She suffered a ICH after a fall years ago and has had decreased memory since then.Denies personal history of pancreatitis or pancreatic cancer. Denies family hx of thyroid cancer.      Previously seen by Dr. Tomas in 12/2017 but is new to me today. Cost of medications is an issue, already in touch with [facility].    Glimepiride added at last appt. A1C remains uncontrolled.     CURRENT DIABETIC MEDS: Lantus 42 units nightly, Metformin 1,000 mg bid, glimepiride 1mg QD, Trulicity 1.5 mg weekly    Uses Vials or Pens: Pens  Rarely missing doses of diabetes medications.     Type of Glucose Meter: Accu-Chek Compact    Checks BG once daily  No hypoglycemia.         Diet: 4 mini meals daily with snackings, BK oatmeal OR eggs/ toast; RANJANA open faced sandwich/ hamburger; DIN biggest meal home cooked meal - limits CHO, eats more meat/ vegetables; + snacking on nuts/ fruit/ sometimes chips; eats mostly at home, drinks coffee, water    Not currently consistently exercising, but does go to [facility] [place]    Last Eye Exam: September 2017  Last Podiatry Exam: 2015    REVIEW OF SYSTEMS  General: no weakness; c/o fatigue - taking naps during day; c/o more frequent HA - relieved with Tylenol  Eyes: no blurry vision or eye pain.    Throat: c/o hoarseness intermittently  Cardiac: no chest pain or palpitations.   Respiratory: no cough or dyspnea.   GI: no abdominal pain or nausea.   Skin: no rashes, wounds, or insulin injection site reactions.   Neuro: no numbness or tingling; no dizziness  Endocrine: no polyuria, polydipsia, polyphagia.   Psych: sleeping well if does not nap during day, no anxiety or depression.   MS: chronic right hip, lower back  "pain at times.     Vital Signs  /76 (BP Location: Left arm, Patient Position: Sitting, BP Method: Medium (Manual))   Pulse 64   Resp 16   Ht 5' 4" (1.626 m)   Wt 77.2 kg (170 lb 3.1 oz)   BMI 29.21 kg/m²     Hemoglobin A1C   Date Value Ref Range Status   05/22/2018 8.7 (H) 4.0 - 5.6 % Final     Comment:     According to ADA guidelines, hemoglobin A1c <7.0% represents  optimal control in non-pregnant diabetic patients. Different  metrics may apply to specific patient populations.   Standards of Medical Care in Diabetes-2016.  For the purpose of screening for the presence of diabetes:  <5.7%     Consistent with the absence of diabetes  5.7-6.4%  Consistent with increasing risk for diabetes   (prediabetes)  >or=6.5%  Consistent with diabetes  Currently, no consensus exists for use of hemoglobin A1c  for diagnosis of diabetes for children.  This Hemoglobin A1c assay has significant interference with fetal   hemoglobin   (HbF). The results are invalid for patients with abnormal amounts of   HbF,   including those with known Hereditary Persistence   of Fetal Hemoglobin. Heterozygous hemoglobin variants (HbAS, HbAC,   HbAD, HbAE, HbA2) do not significantly interfere with this assay;   however, presence of multiple variants in a sample may impact the %   interference.     11/21/2017 8.0 (H) 4.0 - 5.6 % Final     Comment:     According to ADA guidelines, hemoglobin A1c <7.0% represents  optimal control in non-pregnant diabetic patients. Different  metrics may apply to specific patient populations.   Standards of Medical Care in Diabetes-2016.  For the purpose of screening for the presence of diabetes:  <5.7%     Consistent with the absence of diabetes  5.7-6.4%  Consistent with increasing risk for diabetes   (prediabetes)  >or=6.5%  Consistent with diabetes  Currently, no consensus exists for use of hemoglobin A1c  for diagnosis of diabetes for children.  This Hemoglobin A1c assay has significant interference with " fetal   hemoglobin   (HbF). The results are invalid for patients with abnormal amounts of   HbF,   including those with known Hereditary Persistence   of Fetal Hemoglobin. Heterozygous hemoglobin variants (HbAS, HbAC,   HbAD, HbAE, HbA2) do not significantly interfere with this assay;   however, presence of multiple variants in a sample may impact the %   interference.     08/22/2017 8.1 (H) 4.0 - 5.6 % Final     Comment:     According to ADA guidelines, hemoglobin A1c <7.0% represents  optimal control in non-pregnant diabetic patients. Different  metrics may apply to specific patient populations.   Standards of Medical Care in Diabetes-2016.  For the purpose of screening for the presence of diabetes:  <5.7%     Consistent with the absence of diabetes  5.7-6.4%  Consistent with increasing risk for diabetes   (prediabetes)  >or=6.5%  Consistent with diabetes  Currently, no consensus exists for use of hemoglobin A1c  for diagnosis of diabetes for children.  This Hemoglobin A1c assay has significant interference with fetal   hemoglobin   (HbF). The results are invalid for patients with abnormal amounts of   HbF,   including those with known Hereditary Persistence   of Fetal Hemoglobin. Heterozygous hemoglobin variants (HbAS, HbAC,   HbAD, HbAE, HbA2) do not significantly interfere with this assay;   however, presence of multiple variants in a sample may impact the %   interference.         Chemistry        Component Value Date/Time     08/22/2017 1014    K 4.6 08/22/2017 1014     08/22/2017 1014    CO2 29 08/22/2017 1014    BUN 15 08/22/2017 1014    CREATININE 0.8 08/22/2017 1014     (H) 08/22/2017 1014        Component Value Date/Time    CALCIUM 9.8 08/22/2017 1014    ALKPHOS 80 08/22/2017 1014    AST 21 08/22/2017 1014    ALT 21 08/22/2017 1014    BILITOT 1.7 (H) 08/22/2017 1014          Lab Results   Component Value Date    CHOL 142 05/15/2017    CHOL 151 02/16/2016    CHOL 152 03/17/2015     Lab  Results   Component Value Date    HDL 49 05/15/2017    HDL 52 02/16/2016    HDL 52 03/17/2015     Lab Results   Component Value Date    LDLCALC 64.2 05/15/2017    LDLCALC 69.0 02/16/2016    LDLCALC 74.4 03/17/2015     Lab Results   Component Value Date    TRIG 144 05/15/2017    TRIG 150 02/16/2016    TRIG 128 03/17/2015     Lab Results   Component Value Date    TSH 4.511 (H) 05/22/2018     Lab Results   Component Value Date    MICALBCREAT 20.0 02/10/2017     PHYSICAL EXAMINATION  Constitutional: Appears well, no distress  Neck: Supple, trachea midline.   Respiratory: CTA without wheezes, even and unlabored.  Cardiovascular: RRR, S1 and S2 with no murmurs or carotid bruits.  Lymph: no edema.   Skin: warm and dry; no insulin site reactions observed.  Neuro: patient alert and cooperative.  Feet: appropriate footwear; see foot exam from note dated 8/28/17    Assessment and Plan    Type 2 DM with neuropathy long term use of insulin  Discussed diagnosis of DM, progression of disease, long term complications and tx options. Reviewed A1c and BG goals. Previously tried Novolog AC.   - Continue current regimen with Lantus, metformin, Trulicity but increase glimepiride to 2mg QAM.   -Reviewed hypoglycemia, s/s and appropriate tx options.   - Instructed to monitor BG QD at alternating times of day, bring logs/ meter to clinic visits.   - takes ASA, ARB, statin    Diabetic peripheral neuropathy associated with Type 2 DM  Optimize DM control  - not currently on Rx    Hypertension  controlled, continue meds as previously prescribed and monitor    Hyperlipidemia   LDL goal < 100  - controlled, LDL at goal, on moderate intensity statin, LFTs WNL    Vitamin D  Continue supplement  Vit D, 25-Hydroxy   Date Value Ref Range Status   05/15/2017 45 30 - 96 ng/mL Final     Comment:     Vitamin D deficiency.........<10 ng/mL                              Vitamin D insufficiency......10-29 ng/mL       Vitamin D sufficiency........> or equal  to 30 ng/mL  Vitamin D toxicity............>100 ng/mL       Orders Placed This Encounter   Procedures    Hemoglobin A1c     Standing Status:   Future     Standing Expiration Date:   5/31/2019    Microalbumin/creatinine urine ratio     Standing Status:   Future     Standing Expiration Date:   7/30/2019     Order Specific Question:   Specimen Source     Answer:   Urine         Follow-up in about 3 months (around 8/31/2018).

## 2018-06-05 ENCOUNTER — PATIENT MESSAGE (OUTPATIENT)
Dept: ENDOCRINOLOGY | Facility: CLINIC | Age: 68
End: 2018-06-05

## 2018-06-05 DIAGNOSIS — E03.8 SUBCLINICAL HYPOTHYROIDISM: ICD-10-CM

## 2018-06-05 RX ORDER — LEVOTHYROXINE SODIUM 25 UG/1
25 TABLET ORAL DAILY
Qty: 30 TABLET | Refills: 1 | Status: SHIPPED | OUTPATIENT
Start: 2018-06-05 | End: 2018-10-23 | Stop reason: SDUPTHER

## 2018-06-05 NOTE — TELEPHONE ENCOUNTER
Lab Results   Component Value Date    TSH 4.511 (H) 05/22/2018     Start L4 at 25mcg QD. eRx sent to local pharmacy.   Needs TSH in 4 weeks.   Repeat thyroid U/S, order placed.     Discussed case with Dr. Mays who agrees with plan.

## 2018-06-14 ENCOUNTER — TELEPHONE (OUTPATIENT)
Dept: ENDOCRINOLOGY | Facility: CLINIC | Age: 68
End: 2018-06-14

## 2018-06-20 ENCOUNTER — PATIENT OUTREACH (OUTPATIENT)
Dept: ADMINISTRATIVE | Facility: HOSPITAL | Age: 68
End: 2018-06-20

## 2018-07-03 ENCOUNTER — OFFICE VISIT (OUTPATIENT)
Dept: INTERNAL MEDICINE | Facility: CLINIC | Age: 68
End: 2018-07-03
Payer: MEDICARE

## 2018-07-03 VITALS
SYSTOLIC BLOOD PRESSURE: 130 MMHG | DIASTOLIC BLOOD PRESSURE: 60 MMHG | HEIGHT: 64 IN | BODY MASS INDEX: 29.19 KG/M2 | WEIGHT: 171 LBS

## 2018-07-03 DIAGNOSIS — Z79.4 TYPE 2 DIABETES MELLITUS WITH DIABETIC POLYNEUROPATHY, WITH LONG-TERM CURRENT USE OF INSULIN: ICD-10-CM

## 2018-07-03 DIAGNOSIS — Z11.59 NEED FOR HEPATITIS C SCREENING TEST: ICD-10-CM

## 2018-07-03 DIAGNOSIS — E11.69 HYPERLIPIDEMIA ASSOCIATED WITH TYPE 2 DIABETES MELLITUS: ICD-10-CM

## 2018-07-03 DIAGNOSIS — E03.8 SUBCLINICAL HYPOTHYROIDISM: ICD-10-CM

## 2018-07-03 DIAGNOSIS — I15.2 HYPERTENSION ASSOCIATED WITH DIABETES: ICD-10-CM

## 2018-07-03 DIAGNOSIS — E11.42 DIABETIC PERIPHERAL NEUROPATHY ASSOCIATED WITH TYPE 2 DIABETES MELLITUS: ICD-10-CM

## 2018-07-03 DIAGNOSIS — E11.42 TYPE 2 DIABETES MELLITUS WITH DIABETIC POLYNEUROPATHY, WITH LONG-TERM CURRENT USE OF INSULIN: ICD-10-CM

## 2018-07-03 DIAGNOSIS — Z23 NEED FOR 23-POLYVALENT PNEUMOCOCCAL POLYSACCHARIDE VACCINE: ICD-10-CM

## 2018-07-03 DIAGNOSIS — Z00.00 ENCOUNTER FOR HEALTH MAINTENANCE EXAMINATION IN ADULT: Primary | ICD-10-CM

## 2018-07-03 DIAGNOSIS — E55.9 VITAMIN D DEFICIENCY DISEASE: ICD-10-CM

## 2018-07-03 DIAGNOSIS — E11.59 HYPERTENSION ASSOCIATED WITH DIABETES: ICD-10-CM

## 2018-07-03 DIAGNOSIS — E78.5 HYPERLIPIDEMIA ASSOCIATED WITH TYPE 2 DIABETES MELLITUS: ICD-10-CM

## 2018-07-03 PROCEDURE — 99213 OFFICE O/P EST LOW 20 MIN: CPT | Mod: PBBFAC | Performed by: NURSE PRACTITIONER

## 2018-07-03 PROCEDURE — 90732 PPSV23 VACC 2 YRS+ SUBQ/IM: CPT | Mod: PBBFAC

## 2018-07-03 PROCEDURE — 99999 PR PBB SHADOW E&M-EST. PATIENT-LVL III: CPT | Mod: PBBFAC,,, | Performed by: NURSE PRACTITIONER

## 2018-07-03 PROCEDURE — 99214 OFFICE O/P EST MOD 30 MIN: CPT | Mod: S$PBB,,, | Performed by: NURSE PRACTITIONER

## 2018-07-03 PROCEDURE — G0009 ADMIN PNEUMOCOCCAL VACCINE: HCPCS | Mod: PBBFAC

## 2018-07-03 NOTE — PROGRESS NOTES
Subjective:       Patient ID: Linda Fong is a 68 y.o. female.    Chief Complaint: Annual Exam    Pt is here for annual Health Maintenance, PCP Dr. Yanes.    Her T2Dm is managed by Endocrine, ALEXANDER Hutton. Saw her last month. Has subclinical hypothyroidism. They wanted to start her on Synthroid 25mcg daily and do an US. She never got the message, but states she told them she wants to wait on starting the synthroid meds. She has a recheck of this lab and A1C on 8/22/18 with an appt to follow on 8-29/18. Her A1C was 8.7 on 5/22/18 which they are also repeating in August along with a CORONA. Diabetic eye exam due in September.    She declines C-scope and FOBT at this time. Agreeable to having PPSV 23 vaccine today.    Not fasting, due for Vit D, Cholesterol, and Hep C screening. Will add to August lab orders.    No complaints at this time.      Review of Systems   Constitutional: Negative for activity change, appetite change and unexpected weight change.   HENT: Negative for dental problem and hearing loss.    Eyes: Negative for visual disturbance.   Respiratory: Negative for apnea, cough, chest tightness and shortness of breath.    Cardiovascular: Negative for chest pain, palpitations and leg swelling.   Gastrointestinal: Negative for abdominal distention, abdominal pain, anal bleeding, blood in stool, constipation, diarrhea, nausea, rectal pain and vomiting.   Endocrine: Negative for cold intolerance, heat intolerance, polydipsia, polyphagia and polyuria.        As documented in HPI   Genitourinary: Negative for difficulty urinating.   Musculoskeletal: Negative for arthralgias.   Skin: Negative for color change.   Allergic/Immunologic: Negative for environmental allergies, food allergies and immunocompromised state.   Neurological: Negative for dizziness, speech difficulty and weakness.   Hematological: Negative for adenopathy. Does not bruise/bleed easily.   Psychiatric/Behavioral: Negative for agitation, behavioral  "problems, sleep disturbance and suicidal ideas.       Review of patient's allergies indicates:   Allergen Reactions    Penicillins Other (See Comments)     Sore throat as a infant       Current Outpatient Prescriptions:     ACCU-CHEK COMPACT TEST Strp, 1 strip by Misc.(Non-Drug; Combo Route) route once daily. , Disp: , Rfl:     aspirin (ECOTRIN) 81 MG EC tablet, Take 81 mg by mouth once daily.  , Disp: , Rfl:     atorvastatin (LIPITOR) 20 MG tablet, TAKE 1 TABLET DAILY, Disp: 90 tablet, Rfl: 3    BD INSULIN PEN NEEDLE UF MINI 31 gauge x 3/16" Ndle, USE FOUR TIMES A DAY WITH MEALS AND NIGHTLY, Disp: 400 each, Rfl: 3    dulaglutide (TRULICITY) 1.5 mg/0.5 mL PnIj, Inject 1.5 mg into the skin every 7 days., Disp: 2 mL, Rfl: 11    glimepiride (AMARYL) 2 MG tablet, Take 1 tablet (2 mg total) by mouth before breakfast., Disp: 90 tablet, Rfl: 3    insulin glargine (LANTUS SOLOSTAR) 100 unit/mL (3 mL) InPn pen, Inject 42 Units into the skin every evening., Disp: 3 Box, Rfl: 3    losartan-hydrochlorothiazide 50-12.5 mg (HYZAAR) 50-12.5 mg per tablet, TAKE 1 TABLET DAILY, Disp: 90 tablet, Rfl: 2    metformin (GLUCOPHAGE) 1000 MG tablet, TAKE 1 TABLET TWICE A DAY WITH MEALS, Disp: 180 tablet, Rfl: 2    multivitamin capsule, Take 1 capsule by mouth once daily.  , Disp: , Rfl:     VITAMIN D2 50,000 unit capsule, TAKE 1 CAPSULE ONCE A MONTH, Disp: 3 capsule, Rfl: 2    levothyroxine (SYNTHROID) 25 MCG tablet, Take 1 tablet (25 mcg total) by mouth once daily., Disp: 30 tablet, Rfl: 1    Patient Active Problem List   Diagnosis    Type 2 diabetes mellitus with diabetic polyneuropathy, with long-term current use of insulin    Hypertension associated with diabetes    Hyperlipidemia associated with type 2 diabetes mellitus    Osteopenia    Vitamin D deficiency disease    Multinodular goiter    Spell of altered cognition    Diabetic peripheral neuropathy associated with type 2 diabetes mellitus    Subclinical " "hypothyroidism     .  Past Medical History:   Diagnosis Date    Diabetic nephropathy     DJD (degenerative joint disease)     Goiter     HTN (hypertension)     Hyperlipidemia     Intracranial bleeding     12/10    Osteopenia     PN (peripheral neuropathy)     Traumatic brain injury Dec 2010    Type II or unspecified type diabetes mellitus with renal manifestations, uncontrolled(250.42)      Past Surgical History:   Procedure Laterality Date    TONSILLECTOMY       Social History     Social History    Marital status:      Spouse name: N/A    Number of children: N/A    Years of education: N/A     Social History Main Topics    Smoking status: Never Smoker    Smokeless tobacco: Never Used    Alcohol use No    Drug use: No    Sexual activity: Yes     Partners: Male     Birth control/ protection: Post-menopausal      Comment:  with 3 children     Other Topics Concern    None     Social History Narrative     with 3 children     Family History   Problem Relation Age of Onset    Diabetes Mother     Breast cancer Mother     Thyroid disease Mother     Stroke Mother     Cancer Mother     Hypertension Mother     Diabetes Father     Cataracts Father     Stroke Father     Hypertension Father     Diabetes Maternal Grandmother     Stroke Maternal Grandmother     Hypertension Maternal Grandmother     Diabetes Paternal Grandmother     Stroke Paternal Grandmother     Hypertension Paternal Grandmother     Stroke Maternal Grandfather     Hypertension Maternal Grandfather     Stroke Paternal Grandfather     Hypertension Paternal Grandfather     Thyroid disease Daughter     Amblyopia Neg Hx     Blindness Neg Hx     Glaucoma Neg Hx     Macular degeneration Neg Hx     Retinal detachment Neg Hx     Strabismus Neg Hx     Melanoma Neg Hx        Objective:       Vitals:    07/03/18 1358 07/03/18 1424   BP: (!) 140/68 130/60   Weight: 77.6 kg (171 lb)    Height: 5' 4" (1.626 m)  "   PainSc: 0-No pain    Body mass index is 29.35 kg/m².      Physical Exam   Constitutional: She is oriented to person, place, and time. Vital signs are normal. She appears well-developed and well-nourished.   overweight   HENT:   Head: Normocephalic.   Right Ear: Hearing, tympanic membrane, external ear and ear canal normal.   Left Ear: Hearing, tympanic membrane, external ear and ear canal normal.   Eyes: Conjunctivae, EOM and lids are normal. Pupils are equal, round, and reactive to light. Lids are everted and swept, no foreign bodies found.   Neck: Trachea normal, normal range of motion and full passive range of motion without pain. Neck supple. No JVD present. Carotid bruit is not present.   Cardiovascular: Normal rate, regular rhythm, S1 normal, S2 normal, normal heart sounds, intact distal pulses and normal pulses.    Pulmonary/Chest: Effort normal and breath sounds normal.   Abdominal: Soft. Normal appearance and bowel sounds are normal. There is no hepatosplenomegaly.   Musculoskeletal: Normal range of motion.   Neurological: She is alert and oriented to person, place, and time. She has normal strength and normal reflexes.   Skin: Skin is warm, dry and intact. Capillary refill takes less than 2 seconds.   Psychiatric: She has a normal mood and affect. Her speech is normal and behavior is normal. Judgment and thought content normal. Cognition and memory are normal.   Vitals reviewed.      Assessment:       1. Encounter for health maintenance examination in adult     2. Need for 23-polyvalent pneumococcal polysaccharide vaccine     3. Need for hepatitis C screening test  Hepatitis C antibody   4. Hyperlipidemia associated with type 2 diabetes mellitus  Lipid panel   5. Hypertension associated with diabetes     6. Diabetic peripheral neuropathy associated with type 2 diabetes mellitus     7. Subclinical hypothyroidism     8. Type 2 diabetes mellitus with diabetic polyneuropathy, with long-term current use of  insulin     9. Vitamin D deficiency disease  Calcitriol   10. BMI 29.0-29.9,adult             Plan:     Linda was seen today for annual exam.    Diagnoses and all orders for this visit:    Encounter for health maintenance examination in adult  Exam done    Health Maintenance updated    Refused C-scope and FOBT    Need for 23-polyvalent pneumococcal polysaccharide vaccine  Given in pharmacy    Need for hepatitis C screening test  -     Hepatitis C antibody; Future    Hyperlipidemia associated with type 2 diabetes mellitus  -     Lipid panel; Future  Continue cholesterol med, low fat diet, and exercise. Check lipids.    Hypertension associated with diabetes  Take medications as prescribed.    Monitor BP at home, goal BP < or = 140/80, call office if consistently above this range.    Follow low salt DASH diet and exercise.    BMI reviewed.    Go to ED if Headaches, blurred vision, chest pain, or SOB occurs along with elevated readings > or = 160/90.    Diabetic peripheral neuropathy associated with type 2 diabetes mellitus  Managed by endocrine, has lab orders with them for 8-22-18    Subclinical hypothyroidism  Managed by endocrine, has lab orders with them for 8-22-18    Type 2 diabetes mellitus with diabetic polyneuropathy, with long-term current use of insulin  Managed by endocrine, has lab orders with them for 8-22-18    Eye exam due in September, pt reminded    Vitamin D deficiency disease  -     Calcitriol; Future    BMI 29.0-29.9,adult  BMI reviewed     Hep C, lipid, and Vit D levels added to orders for 8-22-18 lab appt with Endocrine's orders, will call with results, if ok RTC in 1yr for annual

## 2018-07-03 NOTE — PATIENT INSTRUCTIONS
Hep C, lipid, and Vit D levels added to orders for 8-22-18 lab appt with Endocrine's orders    Second and last Pneumonia shot given in pharmacy today    Declines colonoscopy for now

## 2018-07-06 ENCOUNTER — PATIENT MESSAGE (OUTPATIENT)
Dept: ENDOCRINOLOGY | Facility: CLINIC | Age: 68
End: 2018-07-06

## 2018-08-22 ENCOUNTER — LAB VISIT (OUTPATIENT)
Dept: LAB | Facility: HOSPITAL | Age: 68
End: 2018-08-22
Payer: MEDICARE

## 2018-08-22 DIAGNOSIS — E11.42 TYPE 2 DIABETES MELLITUS WITH DIABETIC POLYNEUROPATHY, WITH LONG-TERM CURRENT USE OF INSULIN: ICD-10-CM

## 2018-08-22 DIAGNOSIS — Z79.4 TYPE 2 DIABETES MELLITUS WITH DIABETIC POLYNEUROPATHY, WITH LONG-TERM CURRENT USE OF INSULIN: ICD-10-CM

## 2018-08-22 LAB
ESTIMATED AVG GLUCOSE: 171 MG/DL
HBA1C MFR BLD HPLC: 7.6 %

## 2018-08-22 PROCEDURE — 83036 HEMOGLOBIN GLYCOSYLATED A1C: CPT

## 2018-08-22 PROCEDURE — 36415 COLL VENOUS BLD VENIPUNCTURE: CPT

## 2018-08-29 ENCOUNTER — OFFICE VISIT (OUTPATIENT)
Dept: ENDOCRINOLOGY | Facility: CLINIC | Age: 68
End: 2018-08-29
Payer: MEDICARE

## 2018-08-29 VITALS
WEIGHT: 171.19 LBS | BODY MASS INDEX: 29.23 KG/M2 | HEART RATE: 92 BPM | DIASTOLIC BLOOD PRESSURE: 76 MMHG | HEIGHT: 64 IN | SYSTOLIC BLOOD PRESSURE: 128 MMHG

## 2018-08-29 DIAGNOSIS — E11.59 HYPERTENSION ASSOCIATED WITH DIABETES: ICD-10-CM

## 2018-08-29 DIAGNOSIS — Z79.4 TYPE 2 DIABETES MELLITUS WITH DIABETIC POLYNEUROPATHY, WITH LONG-TERM CURRENT USE OF INSULIN: ICD-10-CM

## 2018-08-29 DIAGNOSIS — E11.69 HYPERLIPIDEMIA ASSOCIATED WITH TYPE 2 DIABETES MELLITUS: ICD-10-CM

## 2018-08-29 DIAGNOSIS — E11.42 TYPE 2 DIABETES MELLITUS WITH DIABETIC POLYNEUROPATHY, UNSPECIFIED WHETHER LONG TERM INSULIN USE: Primary | ICD-10-CM

## 2018-08-29 DIAGNOSIS — E78.5 HYPERLIPIDEMIA ASSOCIATED WITH TYPE 2 DIABETES MELLITUS: ICD-10-CM

## 2018-08-29 DIAGNOSIS — E11.42 TYPE 2 DIABETES MELLITUS WITH DIABETIC POLYNEUROPATHY, WITH LONG-TERM CURRENT USE OF INSULIN: ICD-10-CM

## 2018-08-29 DIAGNOSIS — I15.2 HYPERTENSION ASSOCIATED WITH DIABETES: ICD-10-CM

## 2018-08-29 DIAGNOSIS — M85.80 OSTEOPENIA, UNSPECIFIED LOCATION: ICD-10-CM

## 2018-08-29 DIAGNOSIS — E55.9 VITAMIN D DEFICIENCY DISEASE: ICD-10-CM

## 2018-08-29 DIAGNOSIS — E03.8 SUBCLINICAL HYPOTHYROIDISM: ICD-10-CM

## 2018-08-29 DIAGNOSIS — E11.42 DIABETIC PERIPHERAL NEUROPATHY ASSOCIATED WITH TYPE 2 DIABETES MELLITUS: ICD-10-CM

## 2018-08-29 PROCEDURE — 99999 PR PBB SHADOW E&M-EST. PATIENT-LVL III: CPT | Mod: PBBFAC,,, | Performed by: NURSE PRACTITIONER

## 2018-08-29 PROCEDURE — 99213 OFFICE O/P EST LOW 20 MIN: CPT | Mod: PBBFAC | Performed by: NURSE PRACTITIONER

## 2018-08-29 PROCEDURE — 99214 OFFICE O/P EST MOD 30 MIN: CPT | Mod: S$PBB,,, | Performed by: NURSE PRACTITIONER

## 2018-08-29 RX ORDER — INSULIN GLARGINE 100 [IU]/ML
44 INJECTION, SOLUTION SUBCUTANEOUS NIGHTLY
Qty: 3 BOX | Refills: 3
Start: 2018-08-29 | End: 2018-09-17 | Stop reason: SDUPTHER

## 2018-08-29 RX ORDER — ERGOCALCIFEROL 1.25 MG/1
50000 CAPSULE ORAL
Qty: 3 CAPSULE | Refills: 2 | Status: SHIPPED | OUTPATIENT
Start: 2018-08-29 | End: 2019-03-04 | Stop reason: SDUPTHER

## 2018-08-29 NOTE — PROGRESS NOTES
CC: Patient is here for management of Type 2 diabetes and review of medical conditions as listed in visit diagnosis. She is accompanied by her .      HPI: Ms. Linda Fong is a 68 y.o. female who was diagnosed with Type 2 in 1993, on employment physical exam with labs. She has never been hospitalized r/t DM. She suffered a ICH after a fall years ago and has had decreased memory since then.   Previously tried Novolog AC.  Denies personal history of pancreatitis or pancreatic cancer. Denies family hx of thyroid cancer.      Previously seen by Dr. Tomas in 12/2017, last by me 5/2018. Cost of medications is an issue, already in touch with [facility].    Glimepiride added 12/2017 - dose increased at last appt.     CURRENT DIABETIC MEDS: Lantus 42 units nightly, Metformin 1,000 mg bid, glimepiride 2mg QAM, Trulicity 1.5 mg weekly  Uses Vials or Pens: Pens  Rarely missing doses of diabetes medications.     Type of Glucose Meter: Accu-Chek Compact    Checks BG once daily - reports 160-170s; no meter or logs today to clinic  No hypoglycemia.     Diet: 4 mini meals daily with snackings, BK oatmeal; RANJANA open faced sandwich/ hamburger; DIN biggest meal home cooked meal - limits CHO, eats more meat/ vegetables; + snacking on nuts/ fruit/ sometimes chips; eats mostly at home, drinks coffee, water    Not currently consistently exercising, but has membership to [facility]    Last Eye Exam: September 2017  Last Podiatry Exam: 2015    REVIEW OF SYSTEMS  General: no weakness; c/o fatigue - taking naps during day  Eyes: no blurry vision or eye pain.    Cardiac: no chest pain or palpitations.   Respiratory: no cough or dyspnea.   GI: no abdominal pain or nausea.   Skin: no rashes, wounds, or insulin injection site reactions.   Neuro: no numbness or tingling; no dizziness  Endocrine: no polyuria, polydipsia, polyphagia.   Psych: sleeping well if does not nap during day, no anxiety or depression.   MS: chronic right  "hip, lower back pain at times.     Vital Signs  /76 (BP Location: Right arm, Patient Position: Sitting, BP Method: Medium (Manual))   Pulse 92   Ht 5' 4" (1.626 m)   Wt 77.7 kg (171 lb 3 oz)   BMI 29.38 kg/m²     Hemoglobin A1C   Date Value Ref Range Status   08/22/2018 7.6 (H) 4.0 - 5.6 % Final     Comment:     ADA Screening Guidelines:  5.7-6.4%  Consistent with prediabetes  >or=6.5%  Consistent with diabetes  High levels of fetal hemoglobin interfere with the HbA1C  assay. Heterozygous hemoglobin variants (HbS, HgC, etc)do  not significantly interfere with this assay.   However, presence of multiple variants may affect accuracy.     05/22/2018 8.7 (H) 4.0 - 5.6 % Final     Comment:     According to ADA guidelines, hemoglobin A1c <7.0% represents  optimal control in non-pregnant diabetic patients. Different  metrics may apply to specific patient populations.   Standards of Medical Care in Diabetes-2016.  For the purpose of screening for the presence of diabetes:  <5.7%     Consistent with the absence of diabetes  5.7-6.4%  Consistent with increasing risk for diabetes   (prediabetes)  >or=6.5%  Consistent with diabetes  Currently, no consensus exists for use of hemoglobin A1c  for diagnosis of diabetes for children.  This Hemoglobin A1c assay has significant interference with fetal   hemoglobin   (HbF). The results are invalid for patients with abnormal amounts of   HbF,   including those with known Hereditary Persistence   of Fetal Hemoglobin. Heterozygous hemoglobin variants (HbAS, HbAC,   HbAD, HbAE, HbA2) do not significantly interfere with this assay;   however, presence of multiple variants in a sample may impact the %   interference.     11/21/2017 8.0 (H) 4.0 - 5.6 % Final     Comment:     According to ADA guidelines, hemoglobin A1c <7.0% represents  optimal control in non-pregnant diabetic patients. Different  metrics may apply to specific patient populations.   Standards of Medical Care in " Diabetes-2016.  For the purpose of screening for the presence of diabetes:  <5.7%     Consistent with the absence of diabetes  5.7-6.4%  Consistent with increasing risk for diabetes   (prediabetes)  >or=6.5%  Consistent with diabetes  Currently, no consensus exists for use of hemoglobin A1c  for diagnosis of diabetes for children.  This Hemoglobin A1c assay has significant interference with fetal   hemoglobin   (HbF). The results are invalid for patients with abnormal amounts of   HbF,   including those with known Hereditary Persistence   of Fetal Hemoglobin. Heterozygous hemoglobin variants (HbAS, HbAC,   HbAD, HbAE, HbA2) do not significantly interfere with this assay;   however, presence of multiple variants in a sample may impact the %   interference.         Chemistry        Component Value Date/Time     08/22/2017 1014    K 4.6 08/22/2017 1014     08/22/2017 1014    CO2 29 08/22/2017 1014    BUN 15 08/22/2017 1014    CREATININE 0.8 08/22/2017 1014     (H) 08/22/2017 1014        Component Value Date/Time    CALCIUM 9.8 08/22/2017 1014    ALKPHOS 80 08/22/2017 1014    AST 21 08/22/2017 1014    ALT 21 08/22/2017 1014    BILITOT 1.7 (H) 08/22/2017 1014        Lab Results   Component Value Date    CHOL 142 05/15/2017    CHOL 151 02/16/2016    CHOL 152 03/17/2015     Lab Results   Component Value Date    HDL 49 05/15/2017    HDL 52 02/16/2016    HDL 52 03/17/2015     Lab Results   Component Value Date    LDLCALC 64.2 05/15/2017    LDLCALC 69.0 02/16/2016    LDLCALC 74.4 03/17/2015     Lab Results   Component Value Date    TRIG 144 05/15/2017    TRIG 150 02/16/2016    TRIG 128 03/17/2015     Lab Results   Component Value Date    TSH 4.511 (H) 05/22/2018     Lab Results   Component Value Date    MICALBCREAT 29.3 08/22/2018     PHYSICAL EXAMINATION  Constitutional: Appears well, no distress  Neck: Supple, trachea midline.   Respiratory:  even and unlabored.  Lymph: no edema.   Skin: warm and dry; no  insulin site reactions observed.  Neuro: patient alert and cooperative.  Feet: appropriate footwear; see foot exam from note dated 8/28/17    Assessment and Plan    Type 2 DM with neuropathy long term use of insulin  Discussed diagnosis of DM, progression of disease, long term complications and tx options. Reviewed A1c and BG goals.   - Continue current regimen with metformin, Trulicity, and glimepiride 2mg QAM.   - increase Lantus to 44 units QD. Discussed insulin's onset, peak, duration, dosing, administration, site rotation.   -Reviewed hypoglycemia, s/s and appropriate tx options.   - Instructed to monitor BG QD at alternating times of day, bring logs/ meter to clinic visits.   - takes ASA, ARB, statin    Diabetic peripheral neuropathy associated with Type 2 DM  Optimize DM control  - not currently on Rx    Hypertension  controlled, continue meds as previously prescribed and monitor    Hyperlipidemia   LDL goal < 100  - controlled, LDL at goal, on moderate intensity statin, LFTs WNL    Vitamin D / Osteopenia  Currently taking OTC, will resume Rx supplement, eRx for monthly ergo sent  Vit D, 25-Hydroxy   Date Value Ref Range Status   05/15/2017 45 30 - 96 ng/mL Final     Comment:     Vitamin D deficiency.........<10 ng/mL                              Vitamin D insufficiency......10-29 ng/mL       Vitamin D sufficiency........> or equal to 30 ng/mL  Vitamin D toxicity............>100 ng/mL       Subclinical hypothyroidism   - start low dose levothyroxine 25mcg QD, eRx sent, Will repeat TSH  - discussed case with Dr. Mays who agrees with plan      Orders Placed This Encounter   Procedures    Hemoglobin A1c     Standing Status:   Future     Standing Expiration Date:   8/29/2019    Vitamin D     Standing Status:   Future     Standing Expiration Date:   8/29/2019    TSH     Standing Status:   Future     Standing Expiration Date:   8/29/2019    Comprehensive metabolic panel     Standing Status:   Future      Standing Expiration Date:   8/29/2019    Lipid panel     Standing Status:   Future     Standing Expiration Date:   8/29/2019         Follow-up in about 3 months (around 11/29/2018).   Labs at [facility] main [place] lab prior to appt

## 2018-09-14 DIAGNOSIS — E11.42 DIABETIC PERIPHERAL NEUROPATHY ASSOCIATED WITH TYPE 2 DIABETES MELLITUS: ICD-10-CM

## 2018-09-17 ENCOUNTER — PATIENT MESSAGE (OUTPATIENT)
Dept: ENDOCRINOLOGY | Facility: CLINIC | Age: 68
End: 2018-09-17

## 2018-09-17 DIAGNOSIS — E11.42 DIABETIC PERIPHERAL NEUROPATHY ASSOCIATED WITH TYPE 2 DIABETES MELLITUS: ICD-10-CM

## 2018-09-17 RX ORDER — INSULIN GLARGINE 100 [IU]/ML
44 INJECTION, SOLUTION SUBCUTANEOUS NIGHTLY
Qty: 3 BOX | Refills: 3
Start: 2018-09-17 | End: 2019-03-04 | Stop reason: SDUPTHER

## 2018-09-17 RX ORDER — ATORVASTATIN CALCIUM 20 MG/1
20 TABLET, FILM COATED ORAL DAILY
Qty: 90 TABLET | Refills: 3 | Status: SHIPPED | OUTPATIENT
Start: 2018-09-17 | End: 2019-03-04 | Stop reason: SDUPTHER

## 2018-09-17 RX ORDER — INSULIN GLARGINE 100 [IU]/ML
INJECTION, SOLUTION SUBCUTANEOUS
Qty: 45 ML | Refills: 3 | Status: ON HOLD | OUTPATIENT
Start: 2018-09-17 | End: 2019-01-05 | Stop reason: SDUPTHER

## 2018-10-20 ENCOUNTER — PATIENT MESSAGE (OUTPATIENT)
Dept: ENDOCRINOLOGY | Facility: CLINIC | Age: 68
End: 2018-10-20

## 2018-10-20 DIAGNOSIS — E03.8 SUBCLINICAL HYPOTHYROIDISM: Primary | ICD-10-CM

## 2018-10-23 RX ORDER — LEVOTHYROXINE SODIUM 25 UG/1
25 TABLET ORAL DAILY
Qty: 90 TABLET | Refills: 1 | Status: SHIPPED | OUTPATIENT
Start: 2018-10-23 | End: 2019-02-06 | Stop reason: SDUPTHER

## 2018-10-24 RX ORDER — LOSARTAN POTASSIUM AND HYDROCHLOROTHIAZIDE 12.5; 5 MG/1; MG/1
1 TABLET ORAL DAILY
Qty: 90 TABLET | Refills: 3 | Status: SHIPPED | OUTPATIENT
Start: 2018-10-24 | End: 2019-03-04 | Stop reason: SDUPTHER

## 2018-11-09 ENCOUNTER — OFFICE VISIT (OUTPATIENT)
Dept: OPTOMETRY | Facility: CLINIC | Age: 68
End: 2018-11-09
Payer: MEDICARE

## 2018-11-09 DIAGNOSIS — H25.13 NUCLEAR SCLEROTIC CATARACT OF BOTH EYES: ICD-10-CM

## 2018-11-09 DIAGNOSIS — H52.4 HYPEROPIA WITH ASTIGMATISM AND PRESBYOPIA, BILATERAL: ICD-10-CM

## 2018-11-09 DIAGNOSIS — E11.9 TYPE 2 DIABETES MELLITUS WITHOUT RETINOPATHY: Primary | ICD-10-CM

## 2018-11-09 DIAGNOSIS — G43.109 OCULAR MIGRAINE: ICD-10-CM

## 2018-11-09 DIAGNOSIS — H52.203 HYPEROPIA WITH ASTIGMATISM AND PRESBYOPIA, BILATERAL: ICD-10-CM

## 2018-11-09 DIAGNOSIS — Z79.4 CURRENT USE OF INSULIN: ICD-10-CM

## 2018-11-09 DIAGNOSIS — H52.03 HYPEROPIA WITH ASTIGMATISM AND PRESBYOPIA, BILATERAL: ICD-10-CM

## 2018-11-09 DIAGNOSIS — H35.033 HYPERTENSIVE RETINOPATHY OF BOTH EYES: ICD-10-CM

## 2018-11-09 PROCEDURE — 92015 DETERMINE REFRACTIVE STATE: CPT | Mod: ,,, | Performed by: OPTOMETRIST

## 2018-11-09 PROCEDURE — 99999 PR PBB SHADOW E&M-EST. PATIENT-LVL III: CPT | Mod: PBBFAC,,, | Performed by: OPTOMETRIST

## 2018-11-09 PROCEDURE — 99213 OFFICE O/P EST LOW 20 MIN: CPT | Mod: PBBFAC | Performed by: OPTOMETRIST

## 2018-11-09 PROCEDURE — 92014 COMPRE OPH EXAM EST PT 1/>: CPT | Mod: S$PBB,,, | Performed by: OPTOMETRIST

## 2018-11-09 NOTE — PROGRESS NOTES
HPI     Ms. Linda Fong is here for her annual diabetic eye exam.    Patient reports having an ocular migraine about 1 month ago. Saw a fixed   gray spot in her vision, resolves in 20-30 minutes. Occurred about 3 times   since July 2017. Occasional headaches.    New floaters OD 1 month ago, improved.     Would patient like a refraction today? Yes     (-)drops  (-)flashes  (+)floaters: started seeing new floaters in OD one month ago  (-)diplopia    Diabetic: yes  Hemoglobin A1C       Date                     Value               Ref Range             Status           08/22/2018               7.6 (H)             4.0 - 5.6 %         Final  05/22/2018               8.7 (H)             4.0 - 5.6 %         Final  11/21/2017               8.0 (H)             4.0 - 5.6 %         Final    HTN: yes  BP Readings from Last 3 Encounters:  08/29/18 : 128/76  07/03/18 : 130/60  05/31/18 : 114/76      OCULAR HISTORY  Last Eye Exam: 06/08/17 with Dr. Mcdonald  (-)eye surgery   Cataracts OU  Dry eyes  Ocular migraine    FAMILY HISTORY  (-)Glaucoma none         Last edited by Tiarra Mcdonald, OD on 11/9/2018  4:16 PM. (History)            Assessment /Plan     For exam results, see Encounter Report.    Type 2 diabetes mellitus without retinopathy  Current use of insulin   No retinopathy noted OU. Monitor with yearly DFE.     Hypertensive retinopathy of both eyes   Mild retinopathy OU, likely due to previously uncontrolled HTN, but BP has been well controlled recently. Patient educated on findings and potential risk for retinal vascular occlusions. Continue management of HTN as directed by PCP. Monitor yearly.     Ocular migraine   Reviewed signs and symptoms of a retinal detachment, pt understands to return to clinic immediately with any new floaters, flashes of light, or a veil over vision.      Nuclear sclerotic cataract of both eyes   Not visually significant OU. Recommended UV protection. Monitor.      Hyperopia with astigmatism and  presbyopia, bilateral   Small change OU. New glasses prescription released, adaptation expected.  New glasses optional.   Eyeglass Final Rx     Eyeglass Final Rx       Sphere Cylinder Axis Dist VA Add    Right +2.25 +1.00 010 20/20-2 +2.50    Left +2.00 +1.00 028 20/20 +2.50    Expiration Date:  11/10/2019    Comments:  Distance PD = 62  Near PD = 57                 RTC 1 year

## 2018-11-19 ENCOUNTER — PATIENT MESSAGE (OUTPATIENT)
Dept: ENDOCRINOLOGY | Facility: CLINIC | Age: 68
End: 2018-11-19

## 2018-12-04 ENCOUNTER — PATIENT MESSAGE (OUTPATIENT)
Dept: ENDOCRINOLOGY | Facility: CLINIC | Age: 68
End: 2018-12-04

## 2019-01-05 ENCOUNTER — HOSPITAL ENCOUNTER (OUTPATIENT)
Facility: HOSPITAL | Age: 69
Discharge: HOME | End: 2019-01-05
Attending: FAMILY MEDICINE | Admitting: FAMILY MEDICINE
Payer: MEDICARE

## 2019-01-05 VITALS
TEMPERATURE: 98 F | SYSTOLIC BLOOD PRESSURE: 126 MMHG | DIASTOLIC BLOOD PRESSURE: 58 MMHG | HEART RATE: 92 BPM | RESPIRATION RATE: 20 BRPM | HEIGHT: 64 IN | BODY MASS INDEX: 27.36 KG/M2 | OXYGEN SATURATION: 95 % | WEIGHT: 160.25 LBS

## 2019-01-05 DIAGNOSIS — R41.82 ALTERED MENTAL STATUS: ICD-10-CM

## 2019-01-05 DIAGNOSIS — Z79.4 TYPE 2 DIABETES MELLITUS WITH DIABETIC POLYNEUROPATHY, WITH LONG-TERM CURRENT USE OF INSULIN: Primary | ICD-10-CM

## 2019-01-05 DIAGNOSIS — I63.9 CVA (CEREBRAL VASCULAR ACCIDENT): ICD-10-CM

## 2019-01-05 DIAGNOSIS — G45.9 TIA (TRANSIENT ISCHEMIC ATTACK): ICD-10-CM

## 2019-01-05 DIAGNOSIS — E11.42 TYPE 2 DIABETES MELLITUS WITH DIABETIC POLYNEUROPATHY, WITH LONG-TERM CURRENT USE OF INSULIN: Primary | ICD-10-CM

## 2019-01-05 DIAGNOSIS — E11.59 HYPERTENSION ASSOCIATED WITH DIABETES: ICD-10-CM

## 2019-01-05 DIAGNOSIS — I15.2 HYPERTENSION ASSOCIATED WITH DIABETES: ICD-10-CM

## 2019-01-05 LAB
AORTIC ROOT ANNULUS: 2.69 CM
APTT BLDCRRT: 26.6 SEC
AV INDEX (PROSTH): 1.08
AV MEAN GRADIENT: 3.54 MMHG
AV PEAK GRADIENT: 6.76 MMHG
AV VALVE AREA: 2.7 CM2
BILIRUB UR QL STRIP: NEGATIVE
BSA FOR ECHO PROCEDURE: 1.81 M2
CK MB SERPL-MCNC: 0.8 NG/ML
CK MB SERPL-RTO: 1 %
CK SERPL-CCNC: 79 U/L
CLARITY UR: CLEAR
COLOR UR: YELLOW
CRP SERPL-MCNC: 5.1 MG/L
CV ECHO LV RWT: 0.4 CM
DOP CALC AO PEAK VEL: 1.3 M/S
DOP CALC AO VTI: 23.03 CM
DOP CALC LVOT AREA: 2.49 CM2
DOP CALC LVOT DIAMETER: 1.78 CM
DOP CALC LVOT STROKE VOLUME: 62.11 CM3
DOP CALCLVOT PEAK VEL VTI: 24.97 CM
E WAVE DECELERATION TIME: 208.22 MSEC
E/A RATIO: 0.61
ECHO LV POSTERIOR WALL: 0.7 CM (ref 0.6–1.1)
ERYTHROCYTE [SEDIMENTATION RATE] IN BLOOD BY WESTERGREN METHOD: 27 MM/HR
ESTIMATED AVG GLUCOSE: 186 MG/DL
FOLATE SERPL-MCNC: 14.9 NG/ML
FRACTIONAL SHORTENING: 26 % (ref 28–44)
GLUCOSE UR QL STRIP: NEGATIVE
HBA1C MFR BLD HPLC: 8.1 %
HGB UR QL STRIP: NEGATIVE
INR PPP: 1
INTERVENTRICULAR SEPTUM: 0.74 CM (ref 0.6–1.1)
IVRT: 0.13 MSEC
KETONES UR QL STRIP: NEGATIVE
LA MAJOR: 4.82 CM
LA MINOR: 4.85 CM
LA WIDTH: 3.14 CM
LEFT ATRIUM SIZE: 3.36 CM
LEFT ATRIUM VOLUME INDEX: 24.4 ML/M2
LEFT ATRIUM VOLUME: 43.36 CM3
LEFT INTERNAL DIMENSION IN SYSTOLE: 2.63 CM (ref 2.1–4)
LEFT VENTRICLE DIASTOLIC VOLUME INDEX: 29.42 ML/M2
LEFT VENTRICLE DIASTOLIC VOLUME: 52.37 ML
LEFT VENTRICLE MASS INDEX: 37.3 G/M2
LEFT VENTRICLE SYSTOLIC VOLUME INDEX: 14.2 ML/M2
LEFT VENTRICLE SYSTOLIC VOLUME: 25.31 ML
LEFT VENTRICULAR INTERNAL DIMENSION IN DIASTOLE: 3.54 CM (ref 3.5–6)
LEFT VENTRICULAR MASS: 66.45 G
LEUKOCYTE ESTERASE UR QL STRIP: NEGATIVE
MV PEAK A VEL: 1.32 M/S
MV PEAK E VEL: 0.8 M/S
NITRITE UR QL STRIP: NEGATIVE
PH UR STRIP: 6 [PH] (ref 5–8)
PROT UR QL STRIP: NEGATIVE
PROTHROMBIN TIME: 10.6 SEC
PULM VEIN S/D RATIO: 1.38
PV PEAK D VEL: 0.37 M/S
PV PEAK S VEL: 0.51 M/S
RA MAJOR: 4.46 CM
RIGHT VENTRICULAR END-DIASTOLIC DIMENSION: 2.82 CM
RPR SER QL: NORMAL
SP GR UR STRIP: 1.01 (ref 1–1.03)
TROPONIN I SERPL DL<=0.01 NG/ML-MCNC: <0.006 NG/ML
URN SPEC COLLECT METH UR: NORMAL
UROBILINOGEN UR STRIP-ACNC: NEGATIVE EU/DL
VIT B12 SERPL-MCNC: 398 PG/ML

## 2019-01-05 PROCEDURE — G0378 HOSPITAL OBSERVATION PER HR: HCPCS

## 2019-01-05 PROCEDURE — 85610 PROTHROMBIN TIME: CPT

## 2019-01-05 PROCEDURE — 25500020 PHARM REV CODE 255: Performed by: FAMILY MEDICINE

## 2019-01-05 PROCEDURE — A4216 STERILE WATER/SALINE, 10 ML: HCPCS | Performed by: STUDENT IN AN ORGANIZED HEALTH CARE EDUCATION/TRAINING PROGRAM

## 2019-01-05 PROCEDURE — 85730 THROMBOPLASTIN TIME PARTIAL: CPT

## 2019-01-05 PROCEDURE — G8988 SELF CARE GOAL STATUS: HCPCS | Mod: CH

## 2019-01-05 PROCEDURE — 97165 OT EVAL LOW COMPLEX 30 MIN: CPT

## 2019-01-05 PROCEDURE — 86592 SYPHILIS TEST NON-TREP QUAL: CPT

## 2019-01-05 PROCEDURE — 36415 COLL VENOUS BLD VENIPUNCTURE: CPT

## 2019-01-05 PROCEDURE — 86140 C-REACTIVE PROTEIN: CPT

## 2019-01-05 PROCEDURE — G8987 SELF CARE CURRENT STATUS: HCPCS | Mod: CH

## 2019-01-05 PROCEDURE — 97161 PT EVAL LOW COMPLEX 20 MIN: CPT

## 2019-01-05 PROCEDURE — A9585 GADOBUTROL INJECTION: HCPCS | Performed by: FAMILY MEDICINE

## 2019-01-05 PROCEDURE — 82607 VITAMIN B-12: CPT

## 2019-01-05 PROCEDURE — 92523 SPEECH SOUND LANG COMPREHEN: CPT

## 2019-01-05 PROCEDURE — 25000003 PHARM REV CODE 250: Performed by: FAMILY MEDICINE

## 2019-01-05 PROCEDURE — 81003 URINALYSIS AUTO W/O SCOPE: CPT

## 2019-01-05 PROCEDURE — 25000003 PHARM REV CODE 250: Performed by: STUDENT IN AN ORGANIZED HEALTH CARE EDUCATION/TRAINING PROGRAM

## 2019-01-05 PROCEDURE — 86703 HIV-1/HIV-2 1 RESULT ANTBDY: CPT

## 2019-01-05 PROCEDURE — 85652 RBC SED RATE AUTOMATED: CPT

## 2019-01-05 PROCEDURE — 82553 CREATINE MB FRACTION: CPT

## 2019-01-05 PROCEDURE — G8989 SELF CARE D/C STATUS: HCPCS | Mod: CH

## 2019-01-05 PROCEDURE — 84484 ASSAY OF TROPONIN QUANT: CPT

## 2019-01-05 PROCEDURE — 92610 EVALUATE SWALLOWING FUNCTION: CPT

## 2019-01-05 PROCEDURE — G0379 DIRECT REFER HOSPITAL OBSERV: HCPCS

## 2019-01-05 PROCEDURE — 83036 HEMOGLOBIN GLYCOSYLATED A1C: CPT

## 2019-01-05 PROCEDURE — 82746 ASSAY OF FOLIC ACID SERUM: CPT

## 2019-01-05 PROCEDURE — 82550 ASSAY OF CK (CPK): CPT

## 2019-01-05 RX ORDER — ATORVASTATIN CALCIUM 40 MG/1
40 TABLET, FILM COATED ORAL DAILY
Status: DISCONTINUED | OUTPATIENT
Start: 2019-01-05 | End: 2019-01-05 | Stop reason: HOSPADM

## 2019-01-05 RX ORDER — ASPIRIN 81 MG/1
81 TABLET ORAL DAILY
Status: DISCONTINUED | OUTPATIENT
Start: 2019-01-05 | End: 2019-01-05 | Stop reason: HOSPADM

## 2019-01-05 RX ORDER — LABETALOL HCL 20 MG/4 ML
10 SYRINGE (ML) INTRAVENOUS EVERY 6 HOURS PRN
Status: DISCONTINUED | OUTPATIENT
Start: 2019-01-05 | End: 2019-01-05 | Stop reason: HOSPADM

## 2019-01-05 RX ORDER — SODIUM CHLORIDE 0.9 % (FLUSH) 0.9 %
3 SYRINGE (ML) INJECTION EVERY 8 HOURS
Status: DISCONTINUED | OUTPATIENT
Start: 2019-01-05 | End: 2019-01-05 | Stop reason: HOSPADM

## 2019-01-05 RX ORDER — LOSARTAN POTASSIUM 25 MG/1
25 TABLET ORAL DAILY
Status: DISCONTINUED | OUTPATIENT
Start: 2019-01-05 | End: 2019-01-05 | Stop reason: HOSPADM

## 2019-01-05 RX ORDER — ACETAMINOPHEN 325 MG/1
650 TABLET ORAL EVERY 6 HOURS PRN
Status: DISCONTINUED | OUTPATIENT
Start: 2019-01-05 | End: 2019-01-05 | Stop reason: HOSPADM

## 2019-01-05 RX ORDER — GADOBUTROL 604.72 MG/ML
10 INJECTION INTRAVENOUS
Status: COMPLETED | OUTPATIENT
Start: 2019-01-05 | End: 2019-01-05

## 2019-01-05 RX ORDER — LEVOTHYROXINE SODIUM 25 UG/1
25 TABLET ORAL
Status: DISCONTINUED | OUTPATIENT
Start: 2019-01-05 | End: 2019-01-05 | Stop reason: HOSPADM

## 2019-01-05 RX ADMIN — Medication 3 ML: at 07:01

## 2019-01-05 RX ADMIN — LOSARTAN POTASSIUM 25 MG: 25 TABLET ORAL at 09:01

## 2019-01-05 RX ADMIN — ATORVASTATIN CALCIUM 40 MG: 40 TABLET, FILM COATED ORAL at 09:01

## 2019-01-05 RX ADMIN — GADOBUTROL 10 ML: 604.72 INJECTION INTRAVENOUS at 09:01

## 2019-01-05 RX ADMIN — ASPIRIN 81 MG: 81 TABLET, COATED ORAL at 09:01

## 2019-01-05 RX ADMIN — ACETAMINOPHEN 650 MG: 325 TABLET ORAL at 12:01

## 2019-01-05 NOTE — ASSESSMENT & PLAN NOTE
- CT head found no acute concerning abnormalities   - MRA neck w, MRA brain wo, MRI brain w wo ordered  - 2D echo with bubble study ordered  - ESR, CRP, RPR, HIV 1/2, HbA1C, TSH, Folate, Vitamin B12, Lipid panel ordered  - Neuro check q4  - Permissive /120  - Nursing bedside swallow ordered  - NPO until swallow eval  - Will start  mg x1 and atorvastatin 40 mg daily once swallow study passed  - PT/OT ordered  - Aspiration and fall precautions  - Neurology consulted

## 2019-01-05 NOTE — NURSING
AVS and educational attachments shared with patient and  via [facility]. Discussed thoroughly. Patient and  verbalized clear understanding using teach back method. At present no distress noted.Notified bedside nurse of completion of education. Patient to be discharged via w/c with escort service and  with all of patient's belongings. Will cont to be available to patient and family and intervene prn.

## 2019-01-05 NOTE — DISCHARGE SUMMARY
"[facility]  Discharge Summary      Patient Name: Linda Fong  MRN: [id number]  Admission Date: 1/5/2019  Hospital Length of Stay: 0 days  Discharge Date and Time:  01/05/2019 1:32 PM  Attending Physician: Cole Olivares MD   Discharging Provider: Chinedu Knapp MD  Primary Care Provider: Manuel Yanes MD     HPI: Patient is a 69 yo female pmhx of T2DM, occular migraines, HTN, hypothyroid transferred from outside facility for evaluation of CVA.  Patient presented to outside facility for concerns of generalized weakness.  Patient woke this morning in her usual state of health. Patient and her  were driving back from [place] and patient noticed she felt tired and slept most of the trip.  They stopped for lunch and patient experienced difficulty getting out of the car secondary to feeling weak.   notes she also had "slurred" speech during this time and some confusion.  They arrived home and patient continued to feel lethargic.  BG was found to be elevated at 287 and  brought patient to ED.  Head CT showed no acute abnormality.  Patient was transferred to [facility] for MRI, cardiac echo, EEG and neurology evaluation.       During episode of weakness, patient denies headache, change in vision, difficulty breathing, or chest pain.      * No surgery found *      Hospital Course: Patient admitted for MRA, MRI, bloodwork and neurology evaluation. Patient returned to baseline prior to arrival. Speech therapy and physical therapy evaluated patient, and patient was cleared for swallowing and no further PT was deemed necessary. Case was discussed with Neurology, who evaluated the patient, and agree that incident not likely 2/2 TIA/CVA. Possible causes include hyperglycemia and dehydration. Discussed with patient. Recommend f/u with PCP within one week to discuss BS medications. All questions answered.     Consults:   Consults (From admission, onward)        Status Ordering " Provider     Inpatient consult to Neurology  Once     Provider:  (Not yet assigned)    Completed TATA PICKARD     Inpatient consult to Registered Dietitian/Nutritionist  Once     Provider:  (Not yet assigned)    Acknowledged TATA PICKARD     IP consult to case management/social work  Once     Provider:  (Not yet assigned)    Acknowledged TATA PICKARD        Significant Diagnostic Studies: Labs:   CMP   Recent Labs   Lab 01/04/19  1635      K 4.1   CL 99   CO2 28   *   BUN 14   CREATININE 0.70   CALCIUM 9.7   PROT 7.8   ALBUMIN 4.3   BILITOT 0.8   ALKPHOS 77   AST 34   ALT 28   ANIONGAP 10   ESTGFRAFRICA >60.0   EGFRNONAA >60.0    and CBC   Recent Labs   Lab 01/04/19  1635   WBC 9.20   HGB 13.3   HCT 39.9        Radiology:   MRI/MRA showed no new changes, only prior insults.     Pending Diagnostic Studies:     Procedure Component Value Units Date/Time    HIV 1/2 Ag/Ab (4th Gen) [[id number]] Collected:  01/05/19 0426    Order Status:  Sent Lab Status:  In process Updated:  01/05/19 0930    Specimen:  Blood     Transthoracic echo (TTE) complete (Cupid Only) [[id number]] Resulted:  01/05/19 1155    Order Status:  Sent Lab Status:  In process Updated:  01/05/19 1156     BSA 1.81 m2      LA WIDTH 3.14 cm      LVIDD 3.54 cm      IVS 0.74 cm      PW 0.70 cm      Ao root annulus 2.69 cm      LVIDS 2.63 cm      FS 26 %      LA volume 43.36 cm3      LV mass 66.45 g      LA size 3.36 cm      RVDD 2.82 cm      Left Ventricle Relative Wall Thickness 0.40 cm      AV mean gradient 3.54 mmHg      AV valve area 2.70 cm2      AV index (prosthetic) 1.08     E/A ratio 0.61     E wave decelartion time 208.22 msec      IVRT 0.13 msec      Pulm vein S/D ratio 1.38     LVOT diameter 1.78 cm      LVOT area 2.49 cm2      LVOT peak VTI 24.97 cm      Ao peak kathrine 1.30 m/s      Ao VTI 23.03 cm      LVOT stroke volume 62.11 cm3      AV peak gradient 6.76 mmHg      MV Peak E Kathrine 0.80 m/s      MV Peak  A Nathan 1.32 m/s      PV Peak S Nathan 0.51 m/s      PV Peak D Nathan 0.37 m/s      LV Systolic Volume 25.31 mL      LV Systolic Volume Index 14.2 mL/m2      LV Diastolic Volume 52.37 mL      LV Diastolic Volume Index 29.42 mL/m2      LA Volume Index 24.4 mL/m2      LV Mass Index 37.3 g/m2      RA Major Axis 4.46 cm      Left Atrium Minor Axis 4.85 cm      Left Atrium Major Axis 4.82 cm         Final Active Diagnoses:    Diagnosis Date Noted POA    PRINCIPAL PROBLEM:  CVA (cerebral vascular accident) [I63.9] 01/05/2019 Unknown    Type 2 diabetes mellitus with diabetic polyneuropathy, with long-term current use of insulin [E11.42, Z79.4] 09/24/2012 Not Applicable    Hypertension associated with diabetes [E11.59, I10] 09/24/2012 Yes      Problems Resolved During this Admission:      Discharged Condition: stable    Disposition: Home or Self Care    Follow Up:  Follow-up Information     [facility].    Specialty:  Emergency Medicine  Why:  As needed  Contact information:  [address] [place] [address] [phone]           Manuel Yanes MD In 1 week.    Specialty:  Internal Medicine  Contact information:  [address]  [place] [address] [phone]                 Patient Instructions:      Diet Adult Regular     Notify your health care provider if you experience any of the following:  temperature >100.4     Notify your health care provider if you experience any of the following:  persistent nausea and vomiting or diarrhea     Notify your health care provider if you experience any of the following:  severe uncontrolled pain     Notify your health care provider if you experience any of the following:  redness, tenderness, or signs of infection (pain, swelling, redness, odor or green/yellow discharge around incision site)     Notify your health care provider if you experience any of the following:  difficulty breathing or increased cough     Notify your health care provider if  "you experience any of the following:  severe persistent headache     Notify your health care provider if you experience any of the following:  worsening rash     Notify your health care provider if you experience any of the following:  persistent dizziness, light-headedness, or visual disturbances     Notify your health care provider if you experience any of the following:  increased confusion or weakness     Medications:  Reconciled Home Medications:      Medication List      CHANGE how you take these medications    insulin glargine 100 units/mL (3mL) SubQ pen  Commonly known as:  LANTUS SOLOSTAR U-100 INSULIN  Inject 44 Units into the skin every evening.  What changed:  how much to take        CONTINUE taking these medications    ACCU-CHEK COMPACT TEST Strp  Generic drug:  blood sugar diagnostic, drum  1 strip by Misc.(Non-Drug; Combo Route) route once daily.     aspirin 81 MG EC tablet  Commonly known as:  ECOTRIN  Take 81 mg by mouth once daily.     atorvastatin 20 MG tablet  Commonly known as:  LIPITOR  Take 1 tablet (20 mg total) by mouth once daily.     BD ULTRA-FINE MINI PEN NEEDLE 31 gauge x 3/16" Ndle  Generic drug:  pen needle, diabetic  USE FOUR TIMES A DAY WITH MEALS AND NIGHTLY     ergocalciferol 50,000 unit Cap  Commonly known as:  VITAMIN D2  Take 1 capsule (50,000 Units total) by mouth every 30 days.     glimepiride 2 MG tablet  Commonly known as:  AMARYL  Take 1 tablet (2 mg total) by mouth before breakfast.     levothyroxine 25 MCG tablet  Commonly known as:  SYNTHROID  Take 1 tablet (25 mcg total) by mouth once daily.     losartan-hydrochlorothiazide 50-12.5 mg 50-12.5 mg per tablet  Commonly known as:  HYZAAR  Take 1 tablet by mouth once daily.     metFORMIN 1000 MG tablet  Commonly known as:  GLUCOPHAGE  TAKE 1 TABLET TWICE A DAY WITH MEALS     multivitamin capsule  Take 1 capsule by mouth once daily.     TRULICITY 1.5 mg/0.5 mL Pnij  Generic drug:  dulaglutide  Inject 1.5 mg into the skin " every 7 days.          Chinedu Knapp MD  [facility]

## 2019-01-05 NOTE — PT/OT/SLP EVAL
"Physical Therapy Evaluation and Discharge Note    Patient Name:  Linda Fong   MRN:  [id number]    Recommendations:     Discharge Recommendations:  (Home)   Discharge Equipment Recommendations: none   Barriers to discharge: None    Assessment:     Linda Fong is a 68 y.o. female admitted with a medical diagnosis of CVA (cerebral vascular accident). At this time, patient is functioning at their prior level of function and does not require further acute PT services.     Recent Surgery: * No surgery found *      Plan:     During this hospitalization, patient does not require further acute PT services.  Please re-consult if situation changes.      Subjective     Pt states she feels back to her baseline, in regards to mobility. Upon ambulating ~15 feet (as well as frequently during ambulation trial) pt stated how she felt much better than she has been and feel back to normal.     Chief Complaint: None at this time.   Patient/Family Comments/goals: To go home  Pain/Comfort:  · Pain Rating 1: 4/10  · Location 1: head    Patients cultural, spiritual, Hinduism conflicts given the current situation: no    Living Environment:  Pt lives with her  in a [facility] with TTE and [facility] with a built in bench. PTA pt reports being independent with all ADLs and iADLs, including driving, ambulation and transfers. Pt reports owning multiple SPC; however, pt denies use. Pt reports one fall in 2010; however, denies any falls since. Pt states her  "has been watch her" as pt had a near fall the past week.   Prior to admission, patients level of function was independent.  Equipment used at home: none.  DME owned (not currently used): single point cane.  Upon discharge, patient will have assistance from .    Objective:     Communicated with JENNIFER Humphreys prior to session.  Patient found supine with HOB elevated upon PT entry to room found with: telemetry     General Precautions: Standard,     Orthopedic Precautions:N/A   Braces: " N/A     Exams:  · Cognitive Exam:  Patient is oriented to Person, Place, Time and Situation  · Fine Motor Coordination:    · -       Intact  · Gross Motor Coordination:  WFL  · RLE ROM: WFL  · RLE Strength: WFL  · LLE ROM: WFL  · LLE Strength: WFL    Functional Mobility:  · Bed Mobility:     · Rolling Left:  independence  · Rolling Right: independence  · Scooting: independence  · Supine to Sit: independence  · Sit to Supine: independence  · Transfers:     · Sit to Stand:  supervision with no AD  · Bed to Chair: supervision with  no AD  using  ambulated  · Gait: >150 feet  · Balance: Static and Dynamic sitting and standing: Good     AM-PAC 6 CLICK MOBILITY  Total Score:24       Therapeutic Activities and Exercises:  · Bed mobility and transfers as listed above.   · While sitting EOB pt doffed socks and donned yellow non-slip socks independently   · Sit <>stand with SBA multiple trials.   · Pt ambulated 200 feet with SBA, pt did not demonstrate any significant gait deficits or imbalance.       AM-PAC 6 CLICK MOBILITY  Total Score:24     Patient left up in chair with all lines intact, call button in reach, Juliann RN notified and Speech Therapist.  present.    GOALS:   Multidisciplinary Problems     Physical Therapy Goals     Not on file          Multidisciplinary Problems (Resolved)        Problem: Physical Therapy Goal    Goal Priority Disciplines Outcome Goal Variances Interventions   Physical Therapy Goal   (Resolved)     PT, PT/OT Outcome(s) achieved                     History:     Past Medical History:   Diagnosis Date    Diabetic nephropathy     DJD (degenerative joint disease)     Goiter     HTN (hypertension)     Hyperlipidemia     Intracranial bleeding     12/10    Osteopenia     PN (peripheral neuropathy)     Traumatic brain injury Dec 2010    Type II or unspecified type diabetes mellitus with renal manifestations, uncontrolled(250.42)        Past Surgical History:   Procedure Laterality Date     TONSILLECTOMY         Clinical Decision Making:     History  Co-morbidities and personal factors that may impact the plan of care Examination  Body Structures and Functions, activity limitations and participation restrictions that may impact the plan of care Clinical Presentation   Decision Making/ Complexity Score   Co-morbidities:   [] Time since onset of injury / illness / exacerbation  [] Status of current condition  []Patient's cognitive status and safety concerns    [] Multiple Medical Problems (see med hx)  Personal Factors:   [] Patient's age  [] Prior Level of function   [] Patient's home situation (environment and family support)  [] Patient's level of motivation  [] Expected progression of patient      HISTORY:(criteria)    [] 69067 - no personal factors/history    [] 20061 - has 1-2 personal factor/comorbidity     [] 33049 - has >3 personal factor/comorbidity     Body Regions:  [] Objective examination findings  [] Head     []  Neck  [] Trunk   [] Upper Extremity  [] Lower Extremity    Body Systems:  [] For communication ability, affect, cognition, language, and learning style: the assessment of the ability to make needs known, consciousness, orientation (person, place, and time), expected emotional /behavioral responses, and learning preferences (eg, learning barriers, education  needs)  [] For the neuromuscular system: a general assessment of gross coordinated movement (eg, balance, gait, locomotion, transfers, and transitions) and motor function  (motor control and motor learning)  [] For the musculoskeletal system: the assessment of gross symmetry, gross range of motion, gross strength, height, and weight  [] For the integumentary system: the assessment of pliability(texture), presence of scar formation, skin color, and skin integrity  [] For cardiovascular/pulmonary system: the assessment of heart rate, respiratory rate, blood pressure, and edema     Activity limitations:    [] Patient's cognitive  status and saf ety concerns          [] Status of current condition      [] Weight bearing restriction  [] Cardiopulmunary Restriction    Participation Restrictions:   [] Goals and goal agreement with the patient     [] Rehab potential (prognosis) and probable outcome      Examination of Body System: (criteria)    [] 86561 - addressing 1-2 elements    [] 60765 - addressing a total of 3 or more elements     [] 71055 -  Addressing a total of 4 or more elements         Clinical Presentation: (criteria)  Choose one     On examination of body system using standardized tests and measures patient presents with (CHOOSE ONE) elements from any of the following: body structures and functions, activity limitations, and/or participation restrictions.  Leading to a clinical presentation that is considered (CHOOSE ONE)                              Clinical Decision Making  (Eval Complexity):  Choose One     Time Tracking:     PT Received On: 01/05/19  PT Start Time: 0957     PT Stop Time: 1011  PT Total Time (min): 14 min     Billable Minutes: Evaluation 14      Dominic Ochoa PT, DPT  01/05/2019

## 2019-01-05 NOTE — ASSESSMENT & PLAN NOTE
On trulicity, amaryl, lantus, and metformin at home.    Last A1c 7.6 8/2018  Will hold PO medications and cover with SSI

## 2019-01-05 NOTE — PLAN OF CARE
Discharge orders noted, no HH or HME ordered.    Future Appointments   Date Time Provider Department Center   2/25/2019  9:00 AM LAB, APPOINTMENT [facility] LAB VNP [facility]   3/4/2019  1:30 PM LOVE Dailey, FNP [facility] ENDODIA [facility]       Pt's nurse will go over medications/signs and symptoms prior to discharge       01/05/19 1337   Final Note   Assessment Type Final Discharge Note   Anticipated Discharge Disposition Home   What phone number can be called within the next 1-3 days to see how you are doing after discharge? [phone]   Hospital Follow Up  Appt(s) scheduled? No  (Offices closed for weekend. Patient to schedule own follow up appointment.)   Right Care Referral Info   Post Acute Recommendation No Care     Kitty Woods, RN Transitional Navigator  [phone]

## 2019-01-05 NOTE — PT/OT/SLP EVAL
"Speech Language Pathology Evaluation  Cognitive/Bedside Swallow    Patient Name:  Linda Fong   MRN:  [id number]  Admitting Diagnosis: CVA (cerebral vascular accident)    Recommendations:                  General Recommendations:  Follow-up not indicated  Diet recommendations:  Regular, Thin   Aspiration Precautions: Standard aspiration precautions   General Precautions: Standard, fall  Communication strategies:  none    History:     Past Medical History:   Diagnosis Date    Diabetic nephropathy     DJD (degenerative joint disease)     Goiter     HTN (hypertension)     Hyperlipidemia     Intracranial bleeding     12/10    Osteopenia     PN (peripheral neuropathy)     Traumatic brain injury Dec 2010    Type II or unspecified type diabetes mellitus with renal manifestations, uncontrolled(250.42)        Past Surgical History:   Procedure Laterality Date    TONSILLECTOMY       HPI: Patient is a 69 yo female pmhx of T2DM, occular migraines, HTN, hypothyroid transferred from outside facility for evaluation of CVA.  Patient presented to outside facility for concerns of generalized weakness.  Patient woke this morning in her usual state of health. Patient and her  were driving back from [place] and patient noticed she felt tired and slept most of the trip.  They stopped for lunch and patient experienced difficulty getting out of the car secondary to feeling weak.   notes she also had "slurred" speech during this time and some confusion.  They arrived home and patient continued to feel lethargic.  BG was found to be elevated at 287 and  brought patient to ED.  Head CT showed no acute abnormality.  Patient was transferred to [facility] for MRI, cardiac echo, EEG and neurology evaluation.       During episode of weakness, patient denies headache, change in vision, difficulty breathing, or chest pain.      Social History: Patient lives with her  at [place], [place].    Prior " "Intubation HX:  N/A    Modified Barium Swallow: None on file    Chest X-Rays: None on file    MRI Results: No acute intracranial abnormality. No significant arterial abnormalities, noting poor delineation of the bilateral vertebral artery origins.    Unchanged small areas of encephalomalacia/gliosis in the right anterior frontal lobe and left cerebellar hemisphere, suggestive of remote traumatic insult and remote infarct, respectively.    Prior diet: Per pt, regular/thin liquids PO diets.    Subjective     Pt seen for clinical swallow eval and informal speech-language eval. SLP confirmed with LPN prior to entry. Pt found in bed chair next to be prior to end of PT session. Pt agreeable to participate in skilled ST session.    Patient goals: "It was hard getting my words out, like I was searching for them, but I think it's better now" per pt re: word finding skills.     Pain/Comfort:  Pain Rating 1: 4/10  Location 1: head  Pain Addressed 1: (LPN notified)    Objective:   Pt participated in clinical swallow eval this AM. Pt tolerated thin liquids, puree, and hard solid textures with no overt s/s of aspiration, at bedside. Pt also participated in informal speech-language eval to r/o cognitive-linguistic skills. Pt presents with baseline cognitive-linguistic skills.     Cognitive Status:    Arousal/Alertness: Appropriate response to stimuli  Attention: No obvious deficits observed -pt able to sustain and maintain attn throughout eval  Orientation: Oriented x4   Memory:   -Immediate Recall - pt able to recall 3-7# digit spans, independently,   -Delayed recall- SLP provided pt with 3 unrelated words for pt to recall during time delay tasks. During 1 and 3 min delay task, pt was able to independently recall 3/3 given items.     -Long term recall -pt able to recall extensive autobiographical info independently (family members, address, pmhx, etc.)  Problem Solving:   -Sequencing -  pt able to sequence currency in order from " least to greatest with 100% acc. Pt also able to sequence meal prep task (ingredients needed and in correct order) with 100% acc.     -Solutions- pt provided appropriate solutions to simple problem solving tasks with 100% acc.                Receptive Language:   Comprehension:     WFL  Questions Simple yes/no -100% acc  Complex yes/no -100% acc  Open ended questions -100% acc  Commands  One step -100% ac  two step basic commands -100% acc  multistep basic commands -100% acc  Object identifications 10 in Fo 10  Conversation -pt demonstrated recepetive language skills to be judged WFL, with no deficits noted     Pragmatics:    initiation -WFL, turn taking -WFL and topic maintenance -WFL     Expressive Language:  Verbal:    Automatic Speech:    -Counting - pt able to state #s 1-20 indepedently    -Days of the week -pt able to state SAMAN independently    Repetition Words -pt able to repeat simple words and short phrases with no deficits noted    Naming:   -Confrontation -pt able to name items within room and body parts independently with 100% acc   -Single word responsive naming -pt able to name items during responsive naming task with 100% acc    Sentence formulation - Pt's demonstrated skills to be WFL      Motor Speech:  WFL     Voice:   WFL     Visual-Spatial:  DNT     Reading:   DNT      Written Expression:   DNT      Oral Musculature Evaluation  Oral Musculature: WFL  Dentition: present and adequate  Mucosal Quality: good  Mandibular Strength and Mobility: WFL  Oral Labial Strength and Mobility: WFL  Lingual Strength and Mobility: WFL  Buccal Strength and Mobility: WFL  Volitional Swallow: (adequate laryngeal elevation/excursion and timely trigger of swallow)  Voice Prior to PO Intake: (clear)    Bedside Swallow Eval:   Consistencies Assessed:  · Thin liquids -via cup sips x5, straw x4  · Puree -entire pudding cup via tsp  · Solids -(drea crackers) x3     Oral Phase:   · WFL    Pharyngeal Phase:   · no overt  clinical signs/symptoms of aspiration  · no overt clinical signs/symptoms of pharyngeal dysphagia    Compensatory Strategies  · None    Treatment/Education: No further skilled ST services required, as pt has met all short term ST goals  SLP educated pt and pt's family on role of SLP, clinical swallow eval, diet recs/swallow precautions, SLC eval/results and POC (no further skilled ST services). Pt and pt's family acknowledged and confirmed understanding. Pt's family appreciative of skilled ST services provided.     Assessment:     Linda Fong is a 68 y.o. female admitted for evaluation of CVA.  She presents with oral and pharyngeal phases of the swallow judged to be WFL. Pt also demonstrates baseline cognitive-linguistic skills.  SLP recs: Regular/thin liquids po diet with universal swallow precautions. ST services no longer required as pt has met all ST goals. SLP notified LEIGHANN Humphreys and MD Gordon of results/recs. Please re-consult should pt experience any change in status.    Goals:   Multidisciplinary Problems     SLP Goals     Not on file          Multidisciplinary Problems (Resolved)        Problem: SLP Goal    Goal Priority Disciplines Outcome   SLP Goal   (Resolved)     SLP Outcome(s) achieved   Description:  Short Term Goals:  1. Pt will participate in BSS to determine least restrictive diet.- MET 1/5  2. Pt will participate in informal speech-lang eval to r/o cognitive-linguistic deficits.- MET 1/5                      Plan:     · Plan of Care reviewed with:  patient, spouse, daughter(LEIGHANN Humphreys and MD Gordon)   · SLP Follow-Up:  No       Discharge recommendations:  Discharge Facility/Level of Care Needs: other (see comments)(home)   Barriers to Discharge:  None    Time Tracking:     SLP Treatment Date:   01/05/19  Speech Start Time:  1011  Speech Stop Time:  1033     Speech Total Time (min):  22 min    Billable Minutes: Eval 14  and Eval Swallow and Oral Function 8    NAYELY Doty,  CCC-SLP  01/05/2019

## 2019-01-05 NOTE — PLAN OF CARE
Problem: Physical Therapy Goal  Goal: Physical Therapy Goal  Outcome: Outcome(s) achieved Date Met: 01/05/19  PT evaluation and treatment orders received. Initial Physical Therapy evaluation complete 1/5/2019. Patient is at baseline with no further Physical Therapy Needs at this time.

## 2019-01-05 NOTE — CONSULTS
[facility] Consult    Reason for Consult: stroke workup     Information obtained from: patient and family     Chief Complaint: generalized weakness    HPI: 68F with a past medical history of DM2, ocular migraines, HTN, TBI and hypothyroidism who presented with generalized weakness. Pt was driving from [place] with her  and he noted that she was more tired than normal and felt generally fatigued. When she got out of the car she had to hold onto his arm because of the fatigue.  was concerned for hyperglycemia because she had eaten a biscuit, mashed potatoes and pecan pie from [employer]. She took her blood glucose which was 280, much higher than her normal values per her . She continued to have some trouble with finding her words, slurring her speech and was lethargic. Per her family she has episodes like this in the past since her head injury but they did not last as long.      MRI and MRA head and neck unremarkable. 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     PMH:   Past Medical History:   Diagnosis Date    Diabetic nephropathy     DJD (degenerative joint disease)     Goiter     HTN (hypertension)     Hyperlipidemia     Intracranial bleeding     12/10    Osteopenia     PN (peripheral neuropathy)     Traumatic brain injury Dec 2010    Type II or unspecified type diabetes mellitus with renal manifestations, uncontrolled(250.42)        PSH:   Past Surgical History:   Procedure Laterality Date    TONSILLECTOMY         SH:   Social History     Socioeconomic History    Marital status:      Spouse name: Not on file    Number of children: Not on file    Years of education: Not on file    Highest education level: Not on file   Social Needs    Financial resource strain: Not on file    Food insecurity - worry: Not on file    Food insecurity - inability: Not on file    Transportation needs - medical: Not on file    Transportation needs - non-medical: Not on file   Occupational  History    Not on file   Tobacco Use    Smoking status: Never Smoker    Smokeless tobacco: Never Used   Substance and Sexual Activity    Alcohol use: No    Drug use: No    Sexual activity: Yes     Partners: Male     Birth control/protection: Post-menopausal     Comment:  with 3 children   Other Topics Concern    Are you pregnant or think you may be? Not Asked    Breast-feeding Not Asked   Social History Narrative     with 3 children     \  Home meds:    aspirin  81 mg Oral Daily    atorvastatin  40 mg Oral Daily    levothyroxine  25 mcg Oral Before breakfast    losartan  25 mg Oral Daily    sodium chloride 0.9%  3 mL Intravenous Q8H       ________________________  Vitals:    01/05/19 1134   BP: (!) 126/58   Pulse: 91   Resp: 20   Temp: 98.3 °F (36.8 °C)       NEUROLOGICAL EXAM  Mental Status  Orientation: alert and oriented to person, place, time and situation, memory intact  Language: good fluency, no aphasia or dysarthria    Cranial Nerves  Visual acuity grossly intact, PERRLA, EOMI, V1-V3 subjectively intact, smile symmetric, pt closes eyes symmetrically, hearing grossly intact, uvula midline, SCM and trapezius 5/5  Bilaterally, tongue protrudes midline    Motor  Normal tone and bulk. No fasciculations.  LUE 5/5  LLE 5/5  RUE 5/5  RLE 5/5  DTRs 2+  Throughout    Sensory  Light touch  intact throughout    Cerebellar  Finger to nose intact bilaterally  Heel to shin intact bilaterally    Gait  deferred    IMAGING  MRI: R frontal encephalomecia, no acute stroke   MRA: no LVO, unremarkable.     ___________________________________________  ASSESSMENT   68F with a past medical history of DM2, ocular migraines, HTN, TBI and hypothyroidism who presented with generalized weakness and word finding difficulties.      RECOMMENDATIONS:  - pt has a history of similar episodes after TBI; EEG done in the past normal  - MRI and MRA negative for acute stroke or LVO  - hyperglycemic on admit in the 280s;  above pt baseline  - prolonged confusion which has resolved likely 2/2 dehydration from hyperglycemia   - do not recommend any changes in current medications    Case discussed with staff. Will sign off. Thank you for the consult.    Tiarra Parikh MD  [facility], PGY-3

## 2019-01-05 NOTE — HPI
"Patient is a 69 yo female pmhx of T2DM, occular migraines, HTN, hypothyroid transferred from outside facility for evaluation of CVA.  Patient presented to outside facility for concerns of generalized weakness.  Patient woke this morning in her usual state of health. Patient and her  were driving back from [place] and patient noticed she felt tired and slept most of the trip.  They stopped for lunch and patient experienced difficulty getting out of the car secondary to feeling weak.   notes she also had "slurred" speech during this time and some confusion.  They arrived home and patient continued to feel lethargic.  BG was found to be elevated at 287 and  brought patient to ED.  Head CT showed no acute abnormality.  Patient was transferred to [facility] for MRI, cardiac echo, EEG and neurology evaluation.      During episode of weakness, patient denies headache, change in vision, difficulty breathing, or chest pain.    "

## 2019-01-05 NOTE — SUBJECTIVE & OBJECTIVE
"Past Medical History:   Diagnosis Date    Diabetic nephropathy     DJD (degenerative joint disease)     Goiter     HTN (hypertension)     Hyperlipidemia     Intracranial bleeding     12/10    Osteopenia     PN (peripheral neuropathy)     Traumatic brain injury Dec 2010    Type II or unspecified type diabetes mellitus with renal manifestations, uncontrolled(250.42)        Past Surgical History:   Procedure Laterality Date    TONSILLECTOMY         Review of patient's allergies indicates:   Allergen Reactions    Penicillins Other (See Comments)     Sore throat as a infant       No current facility-administered medications on file prior to encounter.      Current Outpatient Medications on File Prior to Encounter   Medication Sig    aspirin (ECOTRIN) 81 MG EC tablet Take 81 mg by mouth once daily.      atorvastatin (LIPITOR) 20 MG tablet Take 1 tablet (20 mg total) by mouth once daily.    dulaglutide (TRULICITY) 1.5 mg/0.5 mL PnIj Inject 1.5 mg into the skin every 7 days.    ergocalciferol (VITAMIN D2) 50,000 unit Cap Take 1 capsule (50,000 Units total) by mouth every 30 days.    glimepiride (AMARYL) 2 MG tablet Take 1 tablet (2 mg total) by mouth before breakfast.    insulin glargine (LANTUS SOLOSTAR U-100 INSULIN) 100 unit/mL (3 mL) InPn pen Inject 44 Units into the skin every evening. (Patient taking differently: Inject 42 Units into the skin every evening. )    levothyroxine (SYNTHROID) 25 MCG tablet Take 1 tablet (25 mcg total) by mouth once daily.    losartan-hydrochlorothiazide 50-12.5 mg (HYZAAR) 50-12.5 mg per tablet Take 1 tablet by mouth once daily.    metformin (GLUCOPHAGE) 1000 MG tablet TAKE 1 TABLET TWICE A DAY WITH MEALS    multivitamin capsule Take 1 capsule by mouth once daily.      ACCU-CHEK COMPACT TEST Strp 1 strip by Misc.(Non-Drug; Combo Route) route once daily.     BD INSULIN PEN NEEDLE UF MINI 31 gauge x 3/16" Ndle USE FOUR TIMES A DAY WITH MEALS AND NIGHTLY    " [DISCONTINUED] LANTUS SOLOSTAR U-100 INSULIN 100 unit/mL (3 mL) InPn pen INJECT 38 UNITS UNDER THE SKIN EVERY EVENING     Family History     Problem Relation (Age of Onset)    Breast cancer Mother    Cancer Mother    Cataracts Father    Diabetes Mother, Father, Maternal Grandmother, Paternal Grandmother    Hypertension Mother, Father, Maternal Grandmother, Paternal Grandmother, Maternal Grandfather, Paternal Grandfather    Stroke Mother, Father, Maternal Grandmother, Paternal Grandmother, Maternal Grandfather, Paternal Grandfather    Thyroid disease Mother, Daughter        Tobacco Use    Smoking status: Never Smoker    Smokeless tobacco: Never Used   Substance and Sexual Activity    Alcohol use: No    Drug use: No    Sexual activity: Yes     Partners: Male     Birth control/protection: Post-menopausal     Comment:  with 3 children     Review of Systems   Constitutional: Positive for activity change, appetite change and fatigue. Negative for chills and fever.   HENT: Negative for congestion and sore throat.    Respiratory: Positive for cough. Negative for chest tightness and shortness of breath.    Cardiovascular: Negative for chest pain and palpitations.   Gastrointestinal: Negative for abdominal pain, constipation, diarrhea, nausea and vomiting.   Genitourinary: Negative for dysuria and hematuria.   Musculoskeletal: Negative for neck pain and neck stiffness.   Neurological: Positive for weakness and headaches. Negative for dizziness.   Psychiatric/Behavioral: Negative for agitation and behavioral problems.     Objective:     Vital Signs (Most Recent):  Temp: 96.9 °F (36.1 °C) (01/05/19 0045)  Pulse: 103 (01/05/19 0148)  Resp: 18 (01/05/19 0045)  BP: (!) 146/80 (01/05/19 0045)  SpO2: 97 % (01/05/19 0045) Vital Signs (24h Range):  Temp:  [96.9 °F (36.1 °C)-98.8 °F (37.1 °C)] 96.9 °F (36.1 °C)  Pulse:  [] 103  Resp:  [14-19] 18  SpO2:  [96 %-100 %] 97 %  BP: (143-181)/(80-95) 146/80     Weight: 72.7  kg (160 lb 4.4 oz)  Body mass index is 27.51 kg/m².    Physical Exam   Constitutional: She is oriented to person, place, and time. She appears well-developed and well-nourished. No distress.   HENT:   Head: Normocephalic and atraumatic.   Mouth/Throat: No oropharyngeal exudate.   Eyes: EOM are normal. Pupils are equal, round, and reactive to light. No scleral icterus.   Neck: Normal range of motion. Neck supple.   Cardiovascular: Normal rate, regular rhythm and normal heart sounds.   No murmur heard.  Pulmonary/Chest: Effort normal. No respiratory distress.   Abdominal: Soft. She exhibits no distension.   Musculoskeletal: Normal range of motion. She exhibits no edema.   Neurological: She is alert and oriented to person, place, and time. No cranial nerve deficit or sensory deficit. She exhibits normal muscle tone. Coordination normal.   Skin: Skin is warm and dry. Capillary refill takes less than 2 seconds. She is not diaphoretic.   Psychiatric: She has a normal mood and affect. Her behavior is normal.   Nursing note and vitals reviewed.        CRANIAL NERVES     CN III, IV, VI   Pupils are equal, round, and reactive to light.  Extraocular motions are normal.        Significant Labs:   CBC:   Recent Labs   Lab 01/04/19  1635   WBC 9.20   HGB 13.3   HCT 39.9        CMP:   Recent Labs   Lab 01/04/19  1635      K 4.1   CL 99   CO2 28   *   BUN 14   CREATININE 0.70   CALCIUM 9.7   PROT 7.8   ALBUMIN 4.3   BILITOT 0.8   ALKPHOS 77   AST 34   ALT 28   ANIONGAP 10   EGFRNONAA >60.0     Coagulation:   Recent Labs   Lab 01/04/19  1635   INR 1.0     Lipid Panel:   Recent Labs   Lab 01/04/19  1635   CHOL 213*   HDL 52   LDLCALC Invalid, Trig>400.0   TRIG 506*   CHOLHDL 24.4     TSH:   Recent Labs   Lab 01/04/19  1635   TSH 0.247*  0.247*       Significant Imaging: I have reviewed all pertinent imaging results/findings within the past 24 hours.

## 2019-01-05 NOTE — PT/OT/SLP PROGRESS
Physical Therapy  Missed Visit Note.   Patient Name:  Linda Fong   MRN:  [id number]    Patient not seen today secondary to CT/MRI (0845). Will follow-up as able.    Dominic Ochoa PT, DPT

## 2019-01-05 NOTE — H&P
"[facility] Medicine  History & Physical    Patient Name: Linda Fong  MRN: [id number]  Admission Date: 1/5/2019  Attending Physician: Cole Olivares MD  Primary Care Provider: Manuel Yanes MD         Patient information was obtained from patient, relative(s) and ER records.     Subjective:     Principal Problem:CVA (cerebral vascular accident)    Chief Complaint: bilateral lower extremity weakness       HPI: Patient is a 67 yo female pmhx of T2DM, occular migraines, HTN, hypothyroid transferred from outside facility for evaluation of CVA.  Patient presented to outside facility for concerns of generalized weakness.  Patient woke this morning in her usual state of health. Patient and her  were driving back from [place] and patient noticed she felt tired and slept most of the trip.  They stopped for lunch and patient experienced difficulty getting out of the car secondary to feeling weak.   notes she also had "slurred" speech during this time and some confusion.  They arrived home and patient continued to feel lethargic.  BG was found to be elevated at 287 and  brought patient to ED.  Head CT showed no acute abnormality.  Patient was transferred to [facility] for MRI, cardiac echo, EEG and neurology evaluation.      During episode of weakness, patient denies headache, change in vision, difficulty breathing, or chest pain.      Past Medical History:   Diagnosis Date    Diabetic nephropathy     DJD (degenerative joint disease)     Goiter     HTN (hypertension)     Hyperlipidemia     Intracranial bleeding     12/10    Osteopenia     PN (peripheral neuropathy)     Traumatic brain injury Dec 2010    Type II or unspecified type diabetes mellitus with renal manifestations, uncontrolled(250.42)        Past Surgical History:   Procedure Laterality Date    TONSILLECTOMY         Review of patient's allergies indicates:   Allergen Reactions    Penicillins Other (See " "Comments)     Sore throat as a infant       No current facility-administered medications on file prior to encounter.      Current Outpatient Medications on File Prior to Encounter   Medication Sig    aspirin (ECOTRIN) 81 MG EC tablet Take 81 mg by mouth once daily.      atorvastatin (LIPITOR) 20 MG tablet Take 1 tablet (20 mg total) by mouth once daily.    dulaglutide (TRULICITY) 1.5 mg/0.5 mL PnIj Inject 1.5 mg into the skin every 7 days.    ergocalciferol (VITAMIN D2) 50,000 unit Cap Take 1 capsule (50,000 Units total) by mouth every 30 days.    glimepiride (AMARYL) 2 MG tablet Take 1 tablet (2 mg total) by mouth before breakfast.    insulin glargine (LANTUS SOLOSTAR U-100 INSULIN) 100 unit/mL (3 mL) InPn pen Inject 44 Units into the skin every evening. (Patient taking differently: Inject 42 Units into the skin every evening. )    levothyroxine (SYNTHROID) 25 MCG tablet Take 1 tablet (25 mcg total) by mouth once daily.    losartan-hydrochlorothiazide 50-12.5 mg (HYZAAR) 50-12.5 mg per tablet Take 1 tablet by mouth once daily.    metformin (GLUCOPHAGE) 1000 MG tablet TAKE 1 TABLET TWICE A DAY WITH MEALS    multivitamin capsule Take 1 capsule by mouth once daily.      ACCU-CHEK COMPACT TEST Strp 1 strip by Misc.(Non-Drug; Combo Route) route once daily.     BD INSULIN PEN NEEDLE UF MINI 31 gauge x 3/16" Ndle USE FOUR TIMES A DAY WITH MEALS AND NIGHTLY    [DISCONTINUED] LANTUS SOLOSTAR U-100 INSULIN 100 unit/mL (3 mL) InPn pen INJECT 38 UNITS UNDER THE SKIN EVERY EVENING     Family History     Problem Relation (Age of Onset)    Breast cancer Mother    Cancer Mother    Cataracts Father    Diabetes Mother, Father, Maternal Grandmother, Paternal Grandmother    Hypertension Mother, Father, Maternal Grandmother, Paternal Grandmother, Maternal Grandfather, Paternal Grandfather    Stroke Mother, Father, Maternal Grandmother, Paternal Grandmother, Maternal Grandfather, Paternal Grandfather    Thyroid disease " Mother, Daughter        Tobacco Use    Smoking status: Never Smoker    Smokeless tobacco: Never Used   Substance and Sexual Activity    Alcohol use: No    Drug use: No    Sexual activity: Yes     Partners: Male     Birth control/protection: Post-menopausal     Comment:  with 3 children     Review of Systems   Constitutional: Positive for activity change, appetite change and fatigue. Negative for chills and fever.   HENT: Negative for congestion and sore throat.    Respiratory: Positive for cough. Negative for chest tightness and shortness of breath.    Cardiovascular: Negative for chest pain and palpitations.   Gastrointestinal: Negative for abdominal pain, constipation, diarrhea, nausea and vomiting.   Genitourinary: Negative for dysuria and hematuria.   Musculoskeletal: Negative for neck pain and neck stiffness.   Neurological: Positive for weakness and headaches. Negative for dizziness.   Psychiatric/Behavioral: Negative for agitation and behavioral problems.     Objective:     Vital Signs (Most Recent):  Temp: 96.9 °F (36.1 °C) (01/05/19 0045)  Pulse: 103 (01/05/19 0148)  Resp: 18 (01/05/19 0045)  BP: (!) 146/80 (01/05/19 0045)  SpO2: 97 % (01/05/19 0045) Vital Signs (24h Range):  Temp:  [96.9 °F (36.1 °C)-98.8 °F (37.1 °C)] 96.9 °F (36.1 °C)  Pulse:  [] 103  Resp:  [14-19] 18  SpO2:  [96 %-100 %] 97 %  BP: (143-181)/(80-95) 146/80     Weight: 72.7 kg (160 lb 4.4 oz)  Body mass index is 27.51 kg/m².    Physical Exam   Constitutional: She is oriented to person, place, and time. She appears well-developed and well-nourished. No distress.   HENT:   Head: Normocephalic and atraumatic.   Mouth/Throat: No oropharyngeal exudate.   Eyes: EOM are normal. Pupils are equal, round, and reactive to light. No scleral icterus.   Neck: Normal range of motion. Neck supple.   Cardiovascular: Normal rate, regular rhythm and normal heart sounds.   No murmur heard.  Pulmonary/Chest: Effort normal. No respiratory  distress.   Abdominal: Soft. She exhibits no distension.   Musculoskeletal: Normal range of motion. She exhibits no edema.   Neurological: She is alert and oriented to person, place, and time. No cranial nerve deficit or sensory deficit. She exhibits normal muscle tone. Coordination normal.   Skin: Skin is warm and dry. Capillary refill takes less than 2 seconds. She is not diaphoretic.   Psychiatric: She has a normal mood and affect. Her behavior is normal.   Nursing note and vitals reviewed.        CRANIAL NERVES     CN III, IV, VI   Pupils are equal, round, and reactive to light.  Extraocular motions are normal.        Significant Labs:   CBC:   Recent Labs   Lab 01/04/19  1635   WBC 9.20   HGB 13.3   HCT 39.9        CMP:   Recent Labs   Lab 01/04/19  1635      K 4.1   CL 99   CO2 28   *   BUN 14   CREATININE 0.70   CALCIUM 9.7   PROT 7.8   ALBUMIN 4.3   BILITOT 0.8   ALKPHOS 77   AST 34   ALT 28   ANIONGAP 10   EGFRNONAA >60.0     Coagulation:   Recent Labs   Lab 01/04/19  1635   INR 1.0     Lipid Panel:   Recent Labs   Lab 01/04/19  1635   CHOL 213*   HDL 52   LDLCALC Invalid, Trig>400.0   TRIG 506*   CHOLHDL 24.4     TSH:   Recent Labs   Lab 01/04/19  1635   TSH 0.247*  0.247*       Significant Imaging: I have reviewed all pertinent imaging results/findings within the past 24 hours.    Assessment/Plan:     * CVA (cerebral vascular accident)    - CT head found no acute concerning abnormalities   - MRA neck w, MRA brain wo, MRI brain w wo ordered  - 2D echo with bubble study ordered  - ESR, CRP, RPR, HIV 1/2, HbA1C, TSH, Folate, Vitamin B12, Lipid panel ordered  - Neuro check q4  - Permissive /120  - Nursing bedside swallow ordered  - NPO until swallow eval  - Will start  mg x1 and atorvastatin 40 mg daily once swallow study passed  - PT/OT ordered  - Aspiration and fall precautions  - Neurology consulted          Hypertension associated with diabetes    Allow for permissive  hypertension and will hold home medications at this time     Type 2 diabetes mellitus with diabetic polyneuropathy, with long-term current use of insulin    On trulicity, amaryl, lantus, and metformin at home.    Last A1c 7.6 8/2018  Will hold PO medications and cover with SSI       VTE Risk Mitigation (From admission, onward)        Ordered     IP VTE HIGH RISK PATIENT  Once      01/05/19 0123     Place sequential compression device  Until discontinued      01/05/19 0123     Reason for No Pharmacological VTE Prophylaxis  Once      01/05/19 0123             Cayla Gordon MD  Department of Hospital Medicine   [facility]

## 2019-01-05 NOTE — DISCHARGE INSTRUCTIONS
Transient Ischemic Attack (TIA), Discharge Instructions for (English) View Edit Remove  Stroke and Heart Disease (English) View Edit Remove  Stroke, Preventing Recurrent with a Healthier Lifestyle (English) View Edit Remove  Stroke, Preventing Recurrent: Eating Healthy (English) View Edit Remove  Migraine Headaches, Preventing, Triggers (English) View Edit Remove

## 2019-01-05 NOTE — PLAN OF CARE
Problem: Adult Inpatient Plan of Care  Goal: Plan of Care Review  Admitted with CVA evaluation.  Care plan and education plan initiated.  Chart review complete.

## 2019-01-05 NOTE — PROGRESS NOTES
Patient AAO x 4. VSS. NAD. IV and tele box removed. Discharge paperwork given to patient to be reviewed with virtual nurse.  at bedside awaiting review also. Safety maintained.

## 2019-01-05 NOTE — PLAN OF CARE
Problem: SLP Goal  Goal: SLP Goal  Short Term Goals:  1. Pt will participate in BSS to determine least restrictive diet.- MET 1/5  2. Pt will participate in informal speech-lang eval to r/o cognitive-linguistic deficits.- MET 1/5    Outcome: Outcome(s) achieved Date Met: 01/05/19 1/5/19:  Pt participated in clinical swallow eval this AM. Pt tolerated thin liquids, puree, and hard solid textures with no overt s/s of aspiration, at bedside. Pt also participated in informal speech-language eval to r/o cognitive-linguistic skills. Pt presents with baseline cognitive-linguistic skills. SLP recs: Regular/thin liquids po diet with universal swallow precautions. ST services no longer required as pt has met all ST goals. SLP notified LEIGHANN Humphreys and MD Gordon of results/recs. Please re-consult should pt experience any change in status.  LOWELL Doty., CCC-SLP  Speech-Language Pathologist

## 2019-01-06 NOTE — PLAN OF CARE
Problem: Occupational Therapy Goal  Goal: Occupational Therapy Goal  Outcome: Outcome(s) achieved Date Met: 01/05/19  OT initial eval and discharge.  REC DME use for home safety:  Shower chair

## 2019-01-06 NOTE — PT/OT/SLP EVAL
"Occupational Therapy   Evaluation and Discharge Note    Name: Linda Fong  MRN: [id number]  Admitting Diagnosis:  CVA (cerebral vascular accident)      Recommendations:     Discharge Recommendations: (home)  Discharge Equipment Recommendations:  shower chair  Barriers to discharge:  None    History:     Occupational Profile:  Living Environment: lives with  in [facility] with WIS with a built in bench-per PT note  Previous level of function: indep ADLs; drives and does IADLS;  Roles and Routines: baby sits; active  Equipment Used at home:  per PT note, pt has built n seat but did not disclose to OT that she shad one at home nor did her .    Assistance upon Discharge:     Past Medical History:   Diagnosis Date    Diabetic nephropathy     DJD (degenerative joint disease)     Goiter     HTN (hypertension)     Hyperlipidemia     Intracranial bleeding     12/10    Osteopenia     PN (peripheral neuropathy)     Traumatic brain injury Dec 2010    Type II or unspecified type diabetes mellitus with renal manifestations, uncontrolled(250.42)        Past Surgical History:   Procedure Laterality Date    TONSILLECTOMY         Subjective     Chief Complaint: "I feel a little off balance"  Patient/Family Comments/goals: none    Pain/Comfort:  · Pain Rating 1: 0/10    Patients cultural, spiritual, Baptism conflicts given the current situation: no    Objective:     Communicated with: nurse prior to session.  Patient found all lines intact, call button in reach, nurse  notified and spouse present and telemetry upon OT entry to room.    General Precautions: Standard, fall   Orthopedic Precautions:N/A   Braces: N/A     Occupational Performance:    Bed Mobility:    · Found sitting EOB    Functional Mobility/Transfers:  · Patient completed Sit <> Stand Transfer with independence  with  no assistive device   · Patient completed Toilet Transfer Step Transfer technique with independence with  no AD  · Functional " "Mobility: indep in room to bathroom, sink and back to bed; no LOB but stated felt " felt a little off balance."    Activities of Daily Living:  · Feeding:  independence EOB  · Grooming: independence standing at sink  · Upper Body Dressing: independence seated on toilet  · Lower Body Dressing: independence seated on toilet  · Toileting: independence with pull up; had slight incontinent episode bM when coughed    Cognitive/Visual Perceptual:  Cognitive/Psychosocial Skills:     -       Oriented to: Person, Place, Time and Situation   -       Follows Commands/attention:Follows two-step commands  -       Communication: clear/fluent  -       Memory: No Deficits noted  -       Safety awareness/insight to disability: intact   -       Mood/Affect/Coping skills/emotional control: Appropriate to situation  Visual/Perceptual:      -Intact glasses    Physical Exam:  Balance:    -       sitting;  good  standing: static good  dynamic:  single leg stance: left: 3 sec and Righ 6 sec.  recommended use shower chair for showering safety; hand rail when using steps.  Postural examination/scapula alignment:    -       No postural abnormalities identified  Motor Planning:    -       intact  Dominant hand:    -       right  Upper Extremity Range of Motion:     -       Right Upper Extremity: WFL  -       Left Upper Extremity: WFL  Upper Extremity Strength:    -       Right Upper Extremity: WFL  -       Left Upper Extremity: WFL   Strength:    -       Right Upper Extremity: WFL  -       Left Upper Extremity: WFL  Fine Motor Coordination:    -       Intact  Gross motor coordination:   WFL  Neurological:    -       normal tone    AMPAC 6 Click ADL:  AMPAC Total Score: 24    Treatment & Education:  Role of OT and POC  Education:   home safety rec:  DME shower chair but per PT note pt has built n seat in walk n shower -pt and  did not disclose to OT upon interview; OT recommended shower chair to use at home for safety.    Patient left " "seated EOB with all lines intact, call button in reach, nurse notified and nurse and spouse present    Assessment:     Linda Fong is a 68 y.o. female with a medical diagnosis of CVA (cerebral vascular accident). At this time, patient is functioning at their prior level of function and does not require further acute OT services.     Clinical Decision Makin.  OT Low:  "Pt evaluation falls under low complexity for evaluation coding due to performance deficits noted in 1-3 areas as stated above and 0 co-morbities affecting current functional status. Data obtained from problem focused assessments. No modifications or assistance was required for completion of evaluation. Only brief occupational profile and history review completed."     Plan:     During this hospitalization, patient does not require further acute OT services.  Please re-consult if situation changes.    · Plan of Care Reviewed with: patient, spouse    This Plan of care has been discussed with the patient who was involved in its development and understands and is in agreement with the identified goals and treatment plan    GOALS:   Multidisciplinary Problems     Occupational Therapy Goals     Not on file          Multidisciplinary Problems (Resolved)        Problem: Occupational Therapy Goal    Goal Priority Disciplines Outcome Interventions   Occupational Therapy Goal   (Resolved)     OT, PT/OT Outcome(s) achieved                    Time Tracking:     OT Date of Treatment: 19  OT Start Time: 1419  OT Stop Time: 1440  OT Total Time (min): 21 min    Billable Minutes:Evaluation 21  Total Time 21    Cecy Portillo OT  2019    "

## 2019-01-07 ENCOUNTER — TELEPHONE (OUTPATIENT)
Dept: INTERNAL MEDICINE | Facility: CLINIC | Age: 69
End: 2019-01-07

## 2019-01-07 LAB — HIV 1+2 AB+HIV1 P24 AG SERPL QL IA: NEGATIVE

## 2019-01-07 NOTE — TELEPHONE ENCOUNTER
----- Message from Juventino Curtis sent at 1/7/2019 12:57 PM CST -----  Contact: Patient [phone]  Sooner appointment than the  can schedule.  Did you offer to schedule the next available appointment and put the patient on the wait list?: Yes, Declined   When is the first available appointment: 03/07/19  What is the nature of the appointment: Follow UP from ER  What visit type: EP  Patient preference of timeframe to be scheduled:      Comments: Patient is requesting to be seen some time within 7 days for the Follow up    Please call an advise  Thank you

## 2019-01-22 ENCOUNTER — OFFICE VISIT (OUTPATIENT)
Dept: INTERNAL MEDICINE | Facility: CLINIC | Age: 69
End: 2019-01-22
Payer: MEDICARE

## 2019-01-22 VITALS
SYSTOLIC BLOOD PRESSURE: 110 MMHG | DIASTOLIC BLOOD PRESSURE: 68 MMHG | BODY MASS INDEX: 28.51 KG/M2 | WEIGHT: 167 LBS | HEIGHT: 64 IN | HEART RATE: 98 BPM

## 2019-01-22 DIAGNOSIS — Z12.11 COLON CANCER SCREENING: ICD-10-CM

## 2019-01-22 DIAGNOSIS — I15.2 HYPERTENSION ASSOCIATED WITH DIABETES: ICD-10-CM

## 2019-01-22 DIAGNOSIS — E11.59 HYPERTENSION ASSOCIATED WITH DIABETES: ICD-10-CM

## 2019-01-22 DIAGNOSIS — E11.42 TYPE 2 DIABETES MELLITUS WITH DIABETIC POLYNEUROPATHY, WITH LONG-TERM CURRENT USE OF INSULIN: ICD-10-CM

## 2019-01-22 DIAGNOSIS — Z12.31 ENCOUNTER FOR SCREENING MAMMOGRAM FOR BREAST CANCER: ICD-10-CM

## 2019-01-22 DIAGNOSIS — E78.5 HYPERLIPIDEMIA ASSOCIATED WITH TYPE 2 DIABETES MELLITUS: ICD-10-CM

## 2019-01-22 DIAGNOSIS — E04.2 MULTINODULAR GOITER: Primary | ICD-10-CM

## 2019-01-22 DIAGNOSIS — E11.69 HYPERLIPIDEMIA ASSOCIATED WITH TYPE 2 DIABETES MELLITUS: ICD-10-CM

## 2019-01-22 DIAGNOSIS — Z79.4 TYPE 2 DIABETES MELLITUS WITH DIABETIC POLYNEUROPATHY, WITH LONG-TERM CURRENT USE OF INSULIN: ICD-10-CM

## 2019-01-22 DIAGNOSIS — E03.9 HYPOTHYROIDISM, ACQUIRED: ICD-10-CM

## 2019-01-22 PROCEDURE — 99999 PR PBB SHADOW E&M-EST. PATIENT-LVL III: ICD-10-PCS | Mod: PBBFAC,,, | Performed by: INTERNAL MEDICINE

## 2019-01-22 PROCEDURE — 99999 PR PBB SHADOW E&M-EST. PATIENT-LVL III: CPT | Mod: PBBFAC,,, | Performed by: INTERNAL MEDICINE

## 2019-01-22 PROCEDURE — 99213 OFFICE O/P EST LOW 20 MIN: CPT | Mod: PBBFAC | Performed by: INTERNAL MEDICINE

## 2019-01-22 PROCEDURE — 99215 OFFICE O/P EST HI 40 MIN: CPT | Mod: S$PBB,,, | Performed by: INTERNAL MEDICINE

## 2019-01-22 PROCEDURE — 99215 PR OFFICE/OUTPT VISIT, EST, LEVL V, 40-54 MIN: ICD-10-PCS | Mod: S$PBB,,, | Performed by: INTERNAL MEDICINE

## 2019-01-22 RX ORDER — NICOTINE POLACRILEX 2 MG
1 GUM BUCCAL DAILY
COMMUNITY

## 2019-01-22 NOTE — PROGRESS NOTES
Subjective:       Patient ID: Linda Fong is a 68 y.o. female.    Chief Complaint: Follow-up (hosiptal/st luly/ then to sesar/ needed neuro)    HPI    Patient is accompanied by her .    Last visit with me May 2017.  Has been seen over the last year by Endocrinology, Internal Medicine, and Optometry.  Admitted to hospital for observation 3 weeks ago for concern of altered mental status and stroke.  She had slurred speech which had returned to baseline by arrival to hospital.  Neurology did not think this was likely due to stroke or TIA.    Started Synthroid around Dec 1st, only new item. When traveling and at dtr's house in [place] and traveling back there was the episode of drowsiness and slurred speech.     Review of Systems   All other systems reviewed and are negative.      Objective:      Physical Exam   Constitutional: She is oriented to person, place, and time. No distress.   HENT:   Head: Atraumatic.   Right Ear: Tympanic membrane normal. No tenderness.   Left Ear: Tympanic membrane normal. No tenderness.   Mouth/Throat: Oropharynx is clear and moist. No oropharyngeal exudate.   Eyes: Pupils are equal, round, and reactive to light. Right eye exhibits no discharge. Left eye exhibits no discharge.   Neck: Normal range of motion. No thyromegaly present.   Cardiovascular: Normal rate, regular rhythm and normal heart sounds.   Pulses:       Dorsalis pedis pulses are 2+ on the right side, and 2+ on the left side.        Posterior tibial pulses are 2+ on the right side, and 2+ on the left side.   Pulmonary/Chest: Effort normal and breath sounds normal. No stridor. She has no wheezes. She has no rales.   Abdominal: Soft. There is no tenderness. There is no guarding.   Musculoskeletal: She exhibits no edema or tenderness.        Right foot: There is normal range of motion and no deformity.        Left foot: There is normal range of motion and no deformity.   Feet:   Right Foot:   Protective Sensation: 5  "sites tested. 5 sites sensed.   Skin Integrity: Negative for ulcer, blister, skin breakdown, erythema, warmth, callus or dry skin.   Left Foot:   Protective Sensation: 5 sites tested. 5 sites sensed.   Skin Integrity: Negative for ulcer, blister, skin breakdown, erythema, warmth, callus or dry skin.   Lymphadenopathy:     She has no cervical adenopathy.   Neurological: She is alert and oriented to person, place, and time.   Skin: Skin is warm and dry. No rash noted.   Psychiatric: She has a normal mood and affect. Her behavior is normal.   Nursing note and vitals reviewed.      Vitals:    01/22/19 1408   BP: 110/68   BP Location: Right arm   Patient Position: Sitting   BP Method: Large (Manual)   Pulse: 98   Weight: 75.8 kg (167 lb)   Height: 5' 4" (1.626 m)     Body mass index is 28.67 kg/m².    RESULTS: Reviewed labs from last 3 months    Assessment:       1. Multinodular goiter    2. Type 2 diabetes mellitus with diabetic polyneuropathy, with long-term current use of insulin    3. Hyperlipidemia associated with type 2 diabetes mellitus    4. Hypertension associated with diabetes    5. Hypothyroidism, acquired    6. Colon cancer screening    7. Encounter for screening mammogram for breast cancer        Plan:   Linda was seen today for follow-up.    Diagnoses and all orders for this visit:    Multinodular goiter:  Prior diagnosis, recently started levothyroxine, most recent TSH was low but this was in setting of acute change, recheck with next set of labs, no changes at this time.    Type 2 diabetes mellitus with diabetic polyneuropathy, with long-term current use of insulin:  Prior diagnosis, recent hemoglobin A1c increasing, patient is working on healthier diet and will follow up with Endocrinology in 8 weeks as planned.    Hyperlipidemia associated with type 2 diabetes mellitus:  Prior diagnosis, not well controlled on recent labs, focus on healthy diet and recheck labs in 6 weeks.    Hypertension associated " with diabetes:  Prior diagnosis, stable, well controlled on current management. No changes at this time, will continue to monitor.   -     Magnesium; Future    Hypothyroidism, acquired    Colon cancer screening  -     Case request GI: COLONOSCOPY    Encounter for screening mammogram for breast cancer  -     Mammo Digital Screening Bilat; Future    Follow-up in about 4 months (around 5/22/2019) for follow up visit.  Manuel Yanes MD  Internal Medicine    Portions of this note were completed using medical dictation software. Please excuse typographical or syntax errors that were missed on review.

## 2019-01-22 NOTE — H&P (VIEW-ONLY)
Subjective:       Patient ID: Linda Fong is a 68 y.o. female.    Chief Complaint: Follow-up (hosiptal/st luly/ then to sesar/ needed neuro)    HPI    Patient is accompanied by her .    Last visit with me May 2017.  Has been seen over the last year by Endocrinology, Internal Medicine, and Optometry.  Admitted to hospital for observation 3 weeks ago for concern of altered mental status and stroke.  She had slurred speech which had returned to baseline by arrival to hospital.  Neurology did not think this was likely due to stroke or TIA.    Started Synthroid around Dec 1st, only new item. When traveling and at dtr's house in [place] and traveling back there was the episode of drowsiness and slurred speech.     Review of Systems   All other systems reviewed and are negative.      Objective:      Physical Exam   Constitutional: She is oriented to person, place, and time. No distress.   HENT:   Head: Atraumatic.   Right Ear: Tympanic membrane normal. No tenderness.   Left Ear: Tympanic membrane normal. No tenderness.   Mouth/Throat: Oropharynx is clear and moist. No oropharyngeal exudate.   Eyes: Pupils are equal, round, and reactive to light. Right eye exhibits no discharge. Left eye exhibits no discharge.   Neck: Normal range of motion. No thyromegaly present.   Cardiovascular: Normal rate, regular rhythm and normal heart sounds.   Pulses:       Dorsalis pedis pulses are 2+ on the right side, and 2+ on the left side.        Posterior tibial pulses are 2+ on the right side, and 2+ on the left side.   Pulmonary/Chest: Effort normal and breath sounds normal. No stridor. She has no wheezes. She has no rales.   Abdominal: Soft. There is no tenderness. There is no guarding.   Musculoskeletal: She exhibits no edema or tenderness.        Right foot: There is normal range of motion and no deformity.        Left foot: There is normal range of motion and no deformity.   Feet:   Right Foot:   Protective Sensation: 5  "sites tested. 5 sites sensed.   Skin Integrity: Negative for ulcer, blister, skin breakdown, erythema, warmth, callus or dry skin.   Left Foot:   Protective Sensation: 5 sites tested. 5 sites sensed.   Skin Integrity: Negative for ulcer, blister, skin breakdown, erythema, warmth, callus or dry skin.   Lymphadenopathy:     She has no cervical adenopathy.   Neurological: She is alert and oriented to person, place, and time.   Skin: Skin is warm and dry. No rash noted.   Psychiatric: She has a normal mood and affect. Her behavior is normal.   Nursing note and vitals reviewed.      Vitals:    01/22/19 1408   BP: 110/68   BP Location: Right arm   Patient Position: Sitting   BP Method: Large (Manual)   Pulse: 98   Weight: 75.8 kg (167 lb)   Height: 5' 4" (1.626 m)     Body mass index is 28.67 kg/m².    RESULTS: Reviewed labs from last 3 months    Assessment:       1. Multinodular goiter    2. Type 2 diabetes mellitus with diabetic polyneuropathy, with long-term current use of insulin    3. Hyperlipidemia associated with type 2 diabetes mellitus    4. Hypertension associated with diabetes    5. Hypothyroidism, acquired    6. Colon cancer screening    7. Encounter for screening mammogram for breast cancer        Plan:   Linda was seen today for follow-up.    Diagnoses and all orders for this visit:    Multinodular goiter:  Prior diagnosis, recently started levothyroxine, most recent TSH was low but this was in setting of acute change, recheck with next set of labs, no changes at this time.    Type 2 diabetes mellitus with diabetic polyneuropathy, with long-term current use of insulin:  Prior diagnosis, recent hemoglobin A1c increasing, patient is working on healthier diet and will follow up with Endocrinology in 8 weeks as planned.    Hyperlipidemia associated with type 2 diabetes mellitus:  Prior diagnosis, not well controlled on recent labs, focus on healthy diet and recheck labs in 6 weeks.    Hypertension associated " with diabetes:  Prior diagnosis, stable, well controlled on current management. No changes at this time, will continue to monitor.   -     Magnesium; Future    Hypothyroidism, acquired    Colon cancer screening  -     Case request GI: COLONOSCOPY    Encounter for screening mammogram for breast cancer  -     Mammo Digital Screening Bilat; Future    Follow-up in about 4 months (around 5/22/2019) for follow up visit.  Manuel Yanes MD  Internal Medicine    Portions of this note were completed using medical dictation software. Please excuse typographical or syntax errors that were missed on review.

## 2019-01-25 DIAGNOSIS — Z12.11 SPECIAL SCREENING FOR MALIGNANT NEOPLASMS, COLON: Primary | ICD-10-CM

## 2019-01-25 RX ORDER — SODIUM, POTASSIUM,MAG SULFATES 17.5-3.13G
1 SOLUTION, RECONSTITUTED, ORAL ORAL DAILY
Qty: 354 ML | Refills: 0 | Status: SHIPPED | OUTPATIENT
Start: 2019-01-25 | End: 2019-01-30

## 2019-01-31 ENCOUNTER — HOSPITAL ENCOUNTER (OUTPATIENT)
Dept: RADIOLOGY | Facility: HOSPITAL | Age: 69
Discharge: HOME | End: 2019-01-31
Attending: INTERNAL MEDICINE
Payer: MEDICARE

## 2019-01-31 DIAGNOSIS — Z12.31 ENCOUNTER FOR SCREENING MAMMOGRAM FOR BREAST CANCER: ICD-10-CM

## 2019-01-31 PROCEDURE — 77067 MAMMO DIGITAL SCREENING BILAT WITH TOMOSYNTHESIS_CAD: ICD-10-PCS | Mod: 26,,, | Performed by: RADIOLOGY

## 2019-01-31 PROCEDURE — 77067 SCR MAMMO BI INCL CAD: CPT | Mod: 26,,, | Performed by: RADIOLOGY

## 2019-01-31 PROCEDURE — 77063 MAMMO DIGITAL SCREENING BILAT WITH TOMOSYNTHESIS_CAD: ICD-10-PCS | Mod: 26,,, | Performed by: RADIOLOGY

## 2019-01-31 PROCEDURE — 77067 SCR MAMMO BI INCL CAD: CPT | Mod: TC,PO

## 2019-01-31 PROCEDURE — 77063 BREAST TOMOSYNTHESIS BI: CPT | Mod: 26,,, | Performed by: RADIOLOGY

## 2019-02-06 RX ORDER — METFORMIN HYDROCHLORIDE 1000 MG/1
1000 TABLET ORAL 2 TIMES DAILY WITH MEALS
Qty: 180 TABLET | Refills: 2 | Status: SHIPPED | OUTPATIENT
Start: 2019-02-06 | End: 2019-02-07 | Stop reason: SDUPTHER

## 2019-02-06 RX ORDER — LEVOTHYROXINE SODIUM 25 UG/1
25 TABLET ORAL DAILY
Qty: 90 TABLET | Refills: 1 | Status: SHIPPED | OUTPATIENT
Start: 2019-02-06 | End: 2019-02-07 | Stop reason: SDUPTHER

## 2019-02-06 NOTE — TELEPHONE ENCOUNTER
----- Message from Reba Aguilar sent at 2/6/2019 10:19 AM CST -----  Contact: Self [phone]  AFSHIN'kary PT     .Refill request.  metformin (GLUCOPHAGE) 1000 MG tablet & levothyroxine (SYNTHROID) 25 MCG tablet  30 day supply   ..  [facility] #[id number] - [place] - [address] OF [facility]  [address]  [facility]   [place] [address]  Phone: [phone] Fax: [phone]

## 2019-02-07 RX ORDER — METFORMIN HYDROCHLORIDE 1000 MG/1
1000 TABLET ORAL 2 TIMES DAILY WITH MEALS
Qty: 180 TABLET | Refills: 2 | Status: SHIPPED | OUTPATIENT
Start: 2019-02-07 | End: 2019-03-04 | Stop reason: SDUPTHER

## 2019-02-07 RX ORDER — LEVOTHYROXINE SODIUM 25 UG/1
TABLET ORAL
Qty: 90 TABLET | Refills: 2 | Status: SHIPPED | OUTPATIENT
Start: 2019-02-07 | End: 2019-03-04 | Stop reason: SDUPTHER

## 2019-02-19 ENCOUNTER — ANESTHESIA (OUTPATIENT)
Dept: ENDOSCOPY | Facility: HOSPITAL | Age: 69
End: 2019-02-19
Payer: MEDICARE

## 2019-02-19 ENCOUNTER — ANESTHESIA EVENT (OUTPATIENT)
Dept: ENDOSCOPY | Facility: HOSPITAL | Age: 69
End: 2019-02-19
Payer: MEDICARE

## 2019-02-19 ENCOUNTER — HOSPITAL ENCOUNTER (OUTPATIENT)
Facility: HOSPITAL | Age: 69
Discharge: HOME | End: 2019-02-19
Attending: INTERNAL MEDICINE | Admitting: INTERNAL MEDICINE
Payer: MEDICARE

## 2019-02-19 VITALS
DIASTOLIC BLOOD PRESSURE: 74 MMHG | OXYGEN SATURATION: 100 % | TEMPERATURE: 98 F | SYSTOLIC BLOOD PRESSURE: 123 MMHG | BODY MASS INDEX: 29.02 KG/M2 | RESPIRATION RATE: 20 BRPM | WEIGHT: 170 LBS | HEART RATE: 77 BPM | HEIGHT: 64 IN

## 2019-02-19 DIAGNOSIS — Z12.11 COLON CANCER SCREENING: Primary | ICD-10-CM

## 2019-02-19 LAB — POCT GLUCOSE: 192 MG/DL (ref 70–110)

## 2019-02-19 PROCEDURE — E9220 PRA ENDO ANESTHESIA: ICD-10-PCS | Mod: PT,,, | Performed by: NURSE ANESTHETIST, CERTIFIED REGISTERED

## 2019-02-19 PROCEDURE — 37000009 HC ANESTHESIA EA ADD 15 MINS: Performed by: INTERNAL MEDICINE

## 2019-02-19 PROCEDURE — 82962 GLUCOSE BLOOD TEST: CPT | Performed by: INTERNAL MEDICINE

## 2019-02-19 PROCEDURE — 63600175 PHARM REV CODE 636 W HCPCS: Performed by: NURSE ANESTHETIST, CERTIFIED REGISTERED

## 2019-02-19 PROCEDURE — 27201012 HC FORCEPS, HOT/COLD, DISP: Performed by: INTERNAL MEDICINE

## 2019-02-19 PROCEDURE — 37000008 HC ANESTHESIA 1ST 15 MINUTES: Performed by: INTERNAL MEDICINE

## 2019-02-19 PROCEDURE — 88305 TISSUE EXAM BY PATHOLOGIST: CPT | Performed by: PATHOLOGY

## 2019-02-19 PROCEDURE — 88305 TISSUE EXAM BY PATHOLOGIST: CPT | Mod: 26,,, | Performed by: PATHOLOGY

## 2019-02-19 PROCEDURE — 45380 PR COLONOSCOPY,BIOPSY: ICD-10-PCS | Mod: PT,,, | Performed by: INTERNAL MEDICINE

## 2019-02-19 PROCEDURE — 25000003 PHARM REV CODE 250: Performed by: INTERNAL MEDICINE

## 2019-02-19 PROCEDURE — 45380 COLONOSCOPY AND BIOPSY: CPT | Mod: PT,,, | Performed by: INTERNAL MEDICINE

## 2019-02-19 PROCEDURE — E9220 PRA ENDO ANESTHESIA: HCPCS | Mod: PT,,, | Performed by: NURSE ANESTHETIST, CERTIFIED REGISTERED

## 2019-02-19 PROCEDURE — 45380 COLONOSCOPY AND BIOPSY: CPT | Performed by: INTERNAL MEDICINE

## 2019-02-19 PROCEDURE — 88305 TISSUE SPECIMEN TO PATHOLOGY - SURGERY: ICD-10-PCS | Mod: 26,,, | Performed by: PATHOLOGY

## 2019-02-19 RX ORDER — PROPOFOL 10 MG/ML
VIAL (ML) INTRAVENOUS CONTINUOUS PRN
Status: DISCONTINUED | OUTPATIENT
Start: 2019-02-19 | End: 2019-02-19

## 2019-02-19 RX ORDER — SODIUM CHLORIDE 9 MG/ML
INJECTION, SOLUTION INTRAVENOUS CONTINUOUS
Status: DISCONTINUED | OUTPATIENT
Start: 2019-02-19 | End: 2019-02-19 | Stop reason: HOSPADM

## 2019-02-19 RX ORDER — PROPOFOL 10 MG/ML
VIAL (ML) INTRAVENOUS
Status: DISCONTINUED | OUTPATIENT
Start: 2019-02-19 | End: 2019-02-19

## 2019-02-19 RX ORDER — LIDOCAINE HCL/PF 100 MG/5ML
SYRINGE (ML) INTRAVENOUS
Status: DISCONTINUED | OUTPATIENT
Start: 2019-02-19 | End: 2019-02-19

## 2019-02-19 RX ORDER — SODIUM CHLORIDE 0.9 % (FLUSH) 0.9 %
3 SYRINGE (ML) INJECTION
Status: DISCONTINUED | OUTPATIENT
Start: 2019-02-19 | End: 2019-02-19 | Stop reason: HOSPADM

## 2019-02-19 RX ADMIN — PROPOFOL 80 MG: 10 INJECTION, EMULSION INTRAVENOUS at 03:02

## 2019-02-19 RX ADMIN — SODIUM CHLORIDE: 0.9 INJECTION, SOLUTION INTRAVENOUS at 01:02

## 2019-02-19 RX ADMIN — PROPOFOL 125 MCG/KG/MIN: 10 INJECTION, EMULSION INTRAVENOUS at 03:02

## 2019-02-19 RX ADMIN — LIDOCAINE HYDROCHLORIDE 50 MG: 20 INJECTION, SOLUTION INTRAVENOUS at 03:02

## 2019-02-19 NOTE — ANESTHESIA POSTPROCEDURE EVALUATION
"Anesthesia Post Evaluation    Patient: Linda Fong    Procedure(s) Performed: Procedure(s) (LRB):  COLONOSCOPY (N/A)    Final Anesthesia Type: general  Patient location during evaluation: GI PACU  Patient participation: Yes- Able to Participate  Level of consciousness: oriented and awake and alert  Post-procedure vital signs: reviewed and stable  Pain management: adequate  Airway patency: patent  PONV status at discharge: No PONV  Anesthetic complications: no      Cardiovascular status: blood pressure returned to baseline and stable  Respiratory status: unassisted, spontaneous ventilation and room air  Hydration status: euvolemic  Follow-up not needed.        Visit Vitals  /61 (BP Location: Left arm, Patient Position: Lying)   Pulse 70   Temp 36.6 °C (97.8 °F) (Temporal)   Resp 18   Ht 5' 4" (1.626 m)   Wt 77.1 kg (170 lb)   SpO2 96%   Breastfeeding? No   BMI 29.18 kg/m²       Pain/Mirta Score: Mirta Score: 10 (2/19/2019  4:20 PM)        "

## 2019-02-19 NOTE — [employer] PATIENT INSTRUCTIONS
Discharge Summary/Instructions after an Endoscopic Procedure  Patient Name: Linda Rob  Patient MRN: [id number]  Patient YOB: 1950 Tuesday, February 19, 2019  Chinedu Hopkins MD  RESTRICTIONS:  During your procedure today, you received medications for sedation.  These   medications may affect your judgment, balance and coordination.  Therefore,   for 24 hours, you have the following restrictions:   - DO NOT drive a car, operate machinery, make legal/financial decisions,   sign important papers or drink alcohol.    ACTIVITY:  Today: no heavy lifting, straining or running due to procedural   sedation/anesthesia.  The following day: return to full activity including work.  DIET:  Eat and drink normally unless instructed otherwise.     TREATMENT FOR COMMON SIDE EFFECTS:  - Mild abdominal pain, nausea, belching, bloating or excessive gas:  rest,   eat lightly and use a heating pad.  - Sore Throat: treat with throat lozenges and/or gargle with warm salt   water.  - Because air was used during the procedure, expelling large amounts of air   from your rectum or belching is normal.  - If a bowel prep was taken, you may not have a bowel movement for 1-3 days.    This is normal.  SYMPTOMS TO WATCH FOR AND REPORT TO YOUR PHYSICIAN:  1. Abdominal pain or bloating, other than gas cramps.  2. Chest pain.  3. Back pain.  4. Signs of infection such as: chills or fever occurring within 24 hours   after the procedure.  5. Rectal bleeding, which would show as bright red, maroon, or black stools.   (A tablespoon of blood from the rectum is not serious, especially if   hemorrhoids are present.)  6. Vomiting.  7. Weakness or dizziness.  GO DIRECTLY TO THE NEAREST EMERGENCY ROOM IF YOU HAVE ANY OF THE FOLLOWING:      Difficulty breathing              Chills and/or fever over 101 F   Persistent vomiting and/or vomiting blood   Severe abdominal pain   Severe chest pain   Black, tarry stools   Bleeding- more than one  tablespoon   Any other symptom or condition that you feel may need urgent attention  Your doctor recommends these additional instructions:  If any biopsies were taken, your doctors clinic will contact you in 1 to 2   weeks with any results.  - Discharge patient to home.   - Patient has a contact number available for emergencies.  The signs and   symptoms of potential delayed complications were discussed with the   patient.  Return to normal activities tomorrow.  Written discharge   instructions were provided to the patient.   - Resume previous diet.   - Continue present medications.   - Await pathology results.   - Telephone GI clinic for pathology results in 1 week.   - Repeat colonoscopy in 5 years for surveillance.  For questions, problems or results please call your physician - Chinedu Hopkins MD at Work:  [phone].  [facility], EMERGENCY ROOM PHONE NUMBER: [phone]  IF A COMPLICATION OR EMERGENCY SITUATION ARISES AND YOU ARE UNABLE TO REACH   YOUR PHYSICIAN - GO DIRECTLY TO THE EMERGENCY ROOM.  Chinedu Hopkins MD  2/19/2019 4:03:33 PM  This report has been verified and signed electronically.  [employer]

## 2019-02-19 NOTE — ANESTHESIA PREPROCEDURE EVALUATION
02/19/2019  Linda Fong is a 68 y.o., female.    Past Medical History:   Diagnosis Date    Diabetic nephropathy     DJD (degenerative joint disease)     Goiter     HTN (hypertension)     Hyperlipidemia     Intracranial bleeding     12/10    Osteopenia     PN (peripheral neuropathy)     Traumatic brain injury Dec 2010    Type II or unspecified type diabetes mellitus with renal manifestations, uncontrolled(250.42)      Anesthesia Evaluation    I have reviewed the Patient Summary Reports.    I have reviewed the Nursing Notes.   I have reviewed the Medications.     Review of Systems  Anesthesia Hx:  Denies Family Hx of Anesthesia complications.   Denies Personal Hx of Anesthesia complications.   Cardiovascular:   Hypertension hyperlipidemia    Pulmonary:  Pulmonary Normal    Hepatic/GI:   Bowel Prep. Denies Liver Disease.    Neurological:   Episode of altered mental status in January - Patient unsure if TIA or medication reaction.  Discharge summary states that per neurology, event was unlikely related to TIA/CVA   Endocrine:   Diabetes        Physical Exam  General:  Well nourished    Airway/Jaw/Neck:  Airway Findings: Mouth Opening: Normal Tongue: Normal  General Airway Assessment: Adult      Dental:  Dental Findings: In tact   Chest/Lungs:  Chest/Lungs Findings: Clear to auscultation, Normal Respiratory Rate     Heart/Vascular:  Heart Findings: Rate: Normal  Rhythm: Regular Rhythm  Sounds: Normal        Mental Status:  Mental Status Findings:  Cooperative, Alert and Oriented         Anesthesia Plan  Type of Anesthesia, risks & benefits discussed:  Anesthesia Type:  general  Patient's Preference:   Intra-op Monitoring Plan: standard ASA monitors  Intra-op Monitoring Plan Comments:   Post Op Pain Control Plan: per primary service following discharge from PACU  Post Op Pain Control Plan Comments:    Induction:   IV  Beta Blocker:  Patient is not currently on a Beta-Blocker (No further documentation required).       Informed Consent: Patient understands risks and agrees with Anesthesia plan.  Questions answered. Anesthesia consent signed with patient.  ASA Score: 3     Day of Surgery Review of History & Physical:    H&P update referred to the provider.         Ready For Surgery From Anesthesia Perspective.

## 2019-02-19 NOTE — TRANSFER OF CARE
"Anesthesia Transfer of Care Note    Patient: Linda Fong    Procedure(s) Performed: Procedure(s) (LRB):  COLONOSCOPY (N/A)    Patient location: PACU    Anesthesia Type: general    Transport from OR: Transported from OR on room air with adequate spontaneous ventilation    Post pain: adequate analgesia    Post assessment: no apparent anesthetic complications and tolerated procedure well    Post vital signs: stable    Level of consciousness: sedated    Nausea/Vomiting: no nausea/vomiting    Complications: none    Transfer of care protocol was followed      Last vitals:   Visit Vitals  BP (!) 101/57 (BP Location: Left arm, Patient Position: Lying)   Pulse 89   Temp 36.6 °C (97.8 °F) (Temporal)   Resp 16   Ht 5' 4" (1.626 m)   Wt 77.1 kg (170 lb)   SpO2 96%   Breastfeeding? No   BMI 29.18 kg/m²     "

## 2019-02-19 NOTE — INTERVAL H&P NOTE
Pre-Procedure H and P Addendum    Patient seen and examined.  History and exam unchanged from prior history and physical.      Procedure: Colonoscopy   Indication: Colon cancer screening  ASA Class: per anesthesiology  Airway: normal  Neck Mobility: full range of motion  Mallampatti score: per anesthesia  History of anesthesia problems: no  Family history of anesthesia problems: no  Anesthesia Plan: MAC    Anesthesia/Surgery risks, benefits and alternative options discussed and understood by patient/family.          Active Hospital Problems    Diagnosis  POA    Colon cancer screening [Z12.11]  Not Applicable      Resolved Hospital Problems   No resolved problems to display.

## 2019-02-19 NOTE — ANESTHESIA RELEASE NOTE
"Anesthesia Release from PACU Note    Patient: Linda Fong    Procedure(s) Performed: Procedure(s) (LRB):  COLONOSCOPY (N/A)    Anesthesia type: general    Post pain: Adequate analgesia    Post assessment: no apparent anesthetic complications, tolerated procedure well and no evidence of recall    Last Vitals:   Visit Vitals  /61 (BP Location: Left arm, Patient Position: Lying)   Pulse 70   Temp 36.6 °C (97.8 °F) (Temporal)   Resp 18   Ht 5' 4" (1.626 m)   Wt 77.1 kg (170 lb)   SpO2 96%   Breastfeeding? No   BMI 29.18 kg/m²       Post vital signs: stable    Level of consciousness: awake, alert  and oriented    Nausea/Vomiting: no nausea/no vomiting    Complications: none    Airway Patency: patent    Respiratory: unassisted, spontaneous ventilation, room air    Cardiovascular: stable and blood pressure at baseline    Hydration: euvolemic  "

## 2019-02-20 ENCOUNTER — OFFICE VISIT (OUTPATIENT)
Dept: DERMATOLOGY | Facility: CLINIC | Age: 69
End: 2019-02-20
Payer: MEDICARE

## 2019-02-20 DIAGNOSIS — L57.0 AK (ACTINIC KERATOSIS): ICD-10-CM

## 2019-02-20 DIAGNOSIS — L73.9 FOLLICULITIS: Primary | ICD-10-CM

## 2019-02-20 LAB — POCT GLUCOSE: 141 MG/DL (ref 70–110)

## 2019-02-20 PROCEDURE — 17000 DESTRUCT PREMALG LESION: CPT | Mod: PBBFAC,PO | Performed by: DERMATOLOGY

## 2019-02-20 PROCEDURE — 17000 DESTRUCT PREMALG LESION: CPT | Mod: S$PBB,,, | Performed by: DERMATOLOGY

## 2019-02-20 PROCEDURE — 17000 PR DESTRUCTION(LASER SURGERY,CRYOSURGERY,CHEMOSURGERY),PREMALIGNANT LESIONS,FIRST LESION: ICD-10-PCS | Mod: S$PBB,,, | Performed by: DERMATOLOGY

## 2019-02-20 PROCEDURE — 99999 PR PBB SHADOW E&M-EST. PATIENT-LVL II: CPT | Mod: PBBFAC,,, | Performed by: DERMATOLOGY

## 2019-02-20 PROCEDURE — 99999 PR PBB SHADOW E&M-EST. PATIENT-LVL II: ICD-10-PCS | Mod: PBBFAC,,, | Performed by: DERMATOLOGY

## 2019-02-20 PROCEDURE — 99212 OFFICE O/P EST SF 10 MIN: CPT | Mod: PBBFAC,PO,25 | Performed by: DERMATOLOGY

## 2019-02-20 PROCEDURE — 99213 PR OFFICE/OUTPT VISIT, EST, LEVL III, 20-29 MIN: ICD-10-PCS | Mod: 25,S$PBB,, | Performed by: DERMATOLOGY

## 2019-02-20 PROCEDURE — 99213 OFFICE O/P EST LOW 20 MIN: CPT | Mod: 25,S$PBB,, | Performed by: DERMATOLOGY

## 2019-02-20 RX ORDER — CLINDAMYCIN PHOSPHATE 10 MG/G
GEL TOPICAL DAILY
Qty: 60 ML | Refills: 3 | Status: SHIPPED | OUTPATIENT
Start: 2019-02-20 | End: 2020-11-13

## 2019-02-20 NOTE — PROGRESS NOTES
Subjective:       Patient ID:  Linda Fong is a 68 y.o. female who presents for   Chief Complaint   Patient presents with    Lesion     Pt presnets for lesion to left nose x 1 months.  Denies pain or bleeding but has grown.  Has been tx with cryo in the past.   Also has a couple of lesions on left upper thigh that she picks at.         Lesion         Review of Systems   Constitutional: Negative for fever, chills, fatigue and malaise.   Skin: Positive for activity-related sunscreen use. Negative for daily sunscreen use and wears hat.   Hematologic/Lymphatic: Bruises/bleeds easily.        Objective:    Physical Exam   Constitutional: She appears well-developed and well-nourished. No distress.   Neurological: She is alert and oriented to person, place, and time. She is not disoriented.   Psychiatric: She has a normal mood and affect.   Skin:   Areas Examined (abnormalities noted in diagram):   Head / Face Inspection Performed  LLE Inspection Performed                   Diagram Legend     Erythematous scaling macule/papule c/w actinic keratosis       Vascular papule c/w angioma      Pigmented verrucoid papule/plaque c/w seborrheic keratosis      Yellow umbilicated papule c/w sebaceous hyperplasia      Irregularly shaped tan macule c/w lentigo     1-2 mm smooth white papules consistent with Milia      Movable subcutaneous cyst with punctum c/w epidermal inclusion cyst      Subcutaneous movable cyst c/w pilar cyst      Firm pink to brown papule c/w dermatofibroma      Pedunculated fleshy papule(s) c/w skin tag(s)      Evenly pigmented macule c/w junctional nevus     Mildly variegated pigmented, slightly irregular-bordered macule c/w mildly atypical nevus      Flesh colored to evenly pigmented papule c/w intradermal nevus       Pink pearly papule/plaque c/w basal cell carcinoma      Erythematous hyperkeratotic cursted plaque c/w SCC      Surgical scar with no sign of skin cancer recurrence      Open and closed  comedones      Inflammatory papules and pustules      Verrucoid papule consistent consistent with wart     Erythematous eczematous patches and plaques     Dystrophic onycholytic nail with subungual debris c/w onychomycosis     Umbilicated papule    Erythematous-base heme-crusted tan verrucoid plaque consistent with inflamed seborrheic keratosis     Erythematous Silvery Scaling Plaque c/w Psoriasis     See annotation      Assessment / Plan:        Folliculitis  -     clindamycin phosphate 1% (CLINDAGEL) 1 % gel; Apply topically once daily. bid  Dispense: 60 mL; Refill: 3  D/c manipulation     AK (actinic keratosis)  Cryosurgery Procedure Note    Verbal consent from the patient is obtained including, but not limited to, risk of hypopigmentation/hyperpigmentation, scar, recurrence of lesion. The patient is aware of the precancerous quality and need for treatment of these lesions. Liquid nitrogen cryosurgery is applied to the 1 actinic keratoses, as detailed in the physical exam, to produce a freeze injury. The patient is aware that blisters may form and is instructed on wound care with gentle cleansing and use of vaseline ointment to keep moist until healed. The patient is supplied a handout on cryosurgery and is instructed to call if lesions do not completely resolve.           Follow-up if symptoms worsen or fail to improve.

## 2019-02-25 ENCOUNTER — PATIENT MESSAGE (OUTPATIENT)
Dept: INTERNAL MEDICINE | Facility: CLINIC | Age: 69
End: 2019-02-25

## 2019-02-25 ENCOUNTER — TELEPHONE (OUTPATIENT)
Dept: INTERNAL MEDICINE | Facility: CLINIC | Age: 69
End: 2019-02-25

## 2019-02-25 ENCOUNTER — LAB VISIT (OUTPATIENT)
Dept: LAB | Facility: HOSPITAL | Age: 69
End: 2019-02-25
Payer: MEDICARE

## 2019-02-25 DIAGNOSIS — E11.59 HYPERTENSION ASSOCIATED WITH DIABETES: ICD-10-CM

## 2019-02-25 DIAGNOSIS — E11.42 TYPE 2 DIABETES MELLITUS WITH DIABETIC POLYNEUROPATHY, UNSPECIFIED WHETHER LONG TERM INSULIN USE: ICD-10-CM

## 2019-02-25 DIAGNOSIS — E78.5 HYPERLIPIDEMIA ASSOCIATED WITH TYPE 2 DIABETES MELLITUS: ICD-10-CM

## 2019-02-25 DIAGNOSIS — E55.9 VITAMIN D DEFICIENCY DISEASE: ICD-10-CM

## 2019-02-25 DIAGNOSIS — I15.2 HYPERTENSION ASSOCIATED WITH DIABETES: ICD-10-CM

## 2019-02-25 DIAGNOSIS — E11.69 HYPERLIPIDEMIA ASSOCIATED WITH TYPE 2 DIABETES MELLITUS: ICD-10-CM

## 2019-02-25 DIAGNOSIS — E03.8 SUBCLINICAL HYPOTHYROIDISM: ICD-10-CM

## 2019-02-25 LAB
25(OH)D3+25(OH)D2 SERPL-MCNC: 34 NG/ML
ALBUMIN SERPL BCP-MCNC: 3.7 G/DL
ALP SERPL-CCNC: 72 U/L
ALT SERPL W/O P-5'-P-CCNC: 23 U/L
ANION GAP SERPL CALC-SCNC: 10 MMOL/L
AST SERPL-CCNC: 22 U/L
BILIRUB SERPL-MCNC: 1.2 MG/DL
BUN SERPL-MCNC: 14 MG/DL
CALCIUM SERPL-MCNC: 9.8 MG/DL
CHLORIDE SERPL-SCNC: 101 MMOL/L
CHOLEST SERPL-MCNC: 142 MG/DL
CHOLEST/HDLC SERPL: 2.7 {RATIO}
CO2 SERPL-SCNC: 29 MMOL/L
CREAT SERPL-MCNC: 0.8 MG/DL
EST. GFR  (AFRICAN AMERICAN): >60 ML/MIN/1.73 M^2
EST. GFR  (NON AFRICAN AMERICAN): >60 ML/MIN/1.73 M^2
ESTIMATED AVG GLUCOSE: 206 MG/DL
GLUCOSE SERPL-MCNC: 195 MG/DL
HBA1C MFR BLD HPLC: 8.8 %
HDLC SERPL-MCNC: 52 MG/DL
HDLC SERPL: 36.6 %
LDLC SERPL CALC-MCNC: 56.6 MG/DL
MAGNESIUM SERPL-MCNC: 1.4 MG/DL
NONHDLC SERPL-MCNC: 90 MG/DL
POTASSIUM SERPL-SCNC: 4.1 MMOL/L
PROT SERPL-MCNC: 7.2 G/DL
SODIUM SERPL-SCNC: 140 MMOL/L
TRIGL SERPL-MCNC: 167 MG/DL
TSH SERPL DL<=0.005 MIU/L-ACNC: 1.93 UIU/ML

## 2019-02-25 PROCEDURE — 80061 LIPID PANEL: CPT

## 2019-02-25 PROCEDURE — 83735 ASSAY OF MAGNESIUM: CPT

## 2019-02-25 PROCEDURE — 83036 HEMOGLOBIN GLYCOSYLATED A1C: CPT

## 2019-02-25 PROCEDURE — 84443 ASSAY THYROID STIM HORMONE: CPT

## 2019-02-25 PROCEDURE — 82306 VITAMIN D 25 HYDROXY: CPT

## 2019-02-25 PROCEDURE — 80053 COMPREHEN METABOLIC PANEL: CPT

## 2019-02-25 PROCEDURE — 36415 COLL VENOUS BLD VENIPUNCTURE: CPT

## 2019-02-25 NOTE — TELEPHONE ENCOUNTER
Please notify the patient that her magnesium levels are low.  I recommend an over-the-counter magnesium supplement called magnesium oxide 400 mg once daily.  This can occasionally cause loose stools and diarrhea, if this develops she should change to taking 1 tablet every other day.  I have sent the patient a [facility] message.     Please sign and close this encounter once completed

## 2019-02-26 ENCOUNTER — TELEPHONE (OUTPATIENT)
Dept: ENDOSCOPY | Facility: HOSPITAL | Age: 69
End: 2019-02-26

## 2019-03-04 ENCOUNTER — OFFICE VISIT (OUTPATIENT)
Dept: ENDOCRINOLOGY | Facility: CLINIC | Age: 69
End: 2019-03-04
Payer: MEDICARE

## 2019-03-04 VITALS
HEART RATE: 72 BPM | BODY MASS INDEX: 29.28 KG/M2 | WEIGHT: 171.5 LBS | DIASTOLIC BLOOD PRESSURE: 76 MMHG | SYSTOLIC BLOOD PRESSURE: 125 MMHG | HEIGHT: 64 IN

## 2019-03-04 DIAGNOSIS — E11.59 HYPERTENSION ASSOCIATED WITH DIABETES: ICD-10-CM

## 2019-03-04 DIAGNOSIS — E03.9 HYPOTHYROIDISM, ACQUIRED: ICD-10-CM

## 2019-03-04 DIAGNOSIS — Z79.4 TYPE 2 DIABETES MELLITUS WITH DIABETIC POLYNEUROPATHY, WITH LONG-TERM CURRENT USE OF INSULIN: ICD-10-CM

## 2019-03-04 DIAGNOSIS — E78.5 HYPERLIPIDEMIA ASSOCIATED WITH TYPE 2 DIABETES MELLITUS: ICD-10-CM

## 2019-03-04 DIAGNOSIS — E55.9 VITAMIN D DEFICIENCY DISEASE: ICD-10-CM

## 2019-03-04 DIAGNOSIS — I15.2 HYPERTENSION ASSOCIATED WITH DIABETES: ICD-10-CM

## 2019-03-04 DIAGNOSIS — E11.69 HYPERLIPIDEMIA ASSOCIATED WITH TYPE 2 DIABETES MELLITUS: ICD-10-CM

## 2019-03-04 DIAGNOSIS — E03.8 SUBCLINICAL HYPOTHYROIDISM: ICD-10-CM

## 2019-03-04 DIAGNOSIS — E11.42 TYPE 2 DIABETES MELLITUS WITH DIABETIC POLYNEUROPATHY, WITH LONG-TERM CURRENT USE OF INSULIN: ICD-10-CM

## 2019-03-04 DIAGNOSIS — E11.42 DIABETIC PERIPHERAL NEUROPATHY ASSOCIATED WITH TYPE 2 DIABETES MELLITUS: Primary | ICD-10-CM

## 2019-03-04 DIAGNOSIS — G45.9 TIA (TRANSIENT ISCHEMIC ATTACK): ICD-10-CM

## 2019-03-04 PROCEDURE — 99999 PR PBB SHADOW E&M-EST. PATIENT-LVL IV: ICD-10-PCS | Mod: PBBFAC,,, | Performed by: NURSE PRACTITIONER

## 2019-03-04 PROCEDURE — 99999 PR PBB SHADOW E&M-EST. PATIENT-LVL IV: CPT | Mod: PBBFAC,,, | Performed by: NURSE PRACTITIONER

## 2019-03-04 PROCEDURE — 99214 OFFICE O/P EST MOD 30 MIN: CPT | Mod: S$PBB,,, | Performed by: NURSE PRACTITIONER

## 2019-03-04 PROCEDURE — 99214 OFFICE O/P EST MOD 30 MIN: CPT | Mod: PBBFAC | Performed by: NURSE PRACTITIONER

## 2019-03-04 PROCEDURE — 99214 PR OFFICE/OUTPT VISIT, EST, LEVL IV, 30-39 MIN: ICD-10-PCS | Mod: S$PBB,,, | Performed by: NURSE PRACTITIONER

## 2019-03-04 RX ORDER — ATORVASTATIN CALCIUM 20 MG/1
20 TABLET, FILM COATED ORAL DAILY
Qty: 90 TABLET | Refills: 3 | Status: SHIPPED | OUTPATIENT
Start: 2019-03-04 | End: 2020-05-08

## 2019-03-04 RX ORDER — METFORMIN HYDROCHLORIDE 1000 MG/1
1000 TABLET ORAL 2 TIMES DAILY WITH MEALS
Qty: 180 TABLET | Refills: 3 | Status: SHIPPED | OUTPATIENT
Start: 2019-03-04 | End: 2020-03-30

## 2019-03-04 RX ORDER — LOSARTAN POTASSIUM AND HYDROCHLOROTHIAZIDE 12.5; 5 MG/1; MG/1
1 TABLET ORAL DAILY
Qty: 90 TABLET | Refills: 3 | Status: SHIPPED | OUTPATIENT
Start: 2019-03-04 | End: 2020-04-19 | Stop reason: SDUPTHER

## 2019-03-04 RX ORDER — INSULIN GLARGINE 100 [IU]/ML
INJECTION, SOLUTION SUBCUTANEOUS
Qty: 3 BOX | Refills: 3 | Status: SHIPPED | OUTPATIENT
Start: 2019-03-04 | End: 2019-07-08

## 2019-03-04 RX ORDER — LEVOTHYROXINE SODIUM 25 UG/1
TABLET ORAL
Qty: 90 TABLET | Refills: 3 | Status: SHIPPED | OUTPATIENT
Start: 2019-03-04 | End: 2020-03-30

## 2019-03-04 RX ORDER — ERGOCALCIFEROL 1.25 MG/1
50000 CAPSULE ORAL
Qty: 4 CAPSULE | Refills: 2 | Status: SHIPPED | OUTPATIENT
Start: 2019-03-04 | End: 2019-05-16 | Stop reason: SDUPTHER

## 2019-03-04 RX ORDER — INSULIN ASPART 100 [IU]/ML
INJECTION, SOLUTION INTRAVENOUS; SUBCUTANEOUS
Qty: 3 BOX | Refills: 3 | Status: SHIPPED | OUTPATIENT
Start: 2019-03-04 | End: 2019-07-08

## 2019-03-04 NOTE — PROGRESS NOTES
CC: Patient is here for management of Type 2 diabetes and review of medical conditions as listed in visit diagnosis. She is accompanied by her .      HPI: Ms. Linda Fong is a 69 y.o. female who was diagnosed with Type 2 in 1993, on employment physical exam with labs. She has never been hospitalized r/t DM. She suffered a ICH after a fall years ago and has had decreased memory since then.  Previously tried Novolog AC.  Denies personal history of pancreatitis or pancreatic cancer. Denies family hx of thyroid cancer.   Pt was seen by FARZANEH Tomas DNP in 2017.   Pt is being seen by me for the first time.  Seen by MANAV Hutton NP in the past 6 mos.  Pt had TIA in Jan 2019.  a1c has gone up over the past 6 mos.   Lab Results   Component Value Date    HGBA1C 8.8 (H) 02/25/2019     Cost of medications is an issue, already in touch with [facility].    Glimepiride added 12/2017 - dose increased at last appt.     CURRENT DIABETIC MEDS: Lantus 44 units nightly, Metformin 1,000 mg bid, glimepiride 2mg QAM, Trulicity 1.5 mg weekly  Uses Vials or Pens: Pens  Rarely missing doses of diabetes medications.           Type of Glucose Meter: Accu-Chek Compact    Checks BG once daily     No hypoglycemia.     Diet: 4 mini meals daily with snackings, BK oatmeal; RANJANA open faced sandwich/ hamburger; DIN biggest meal home cooked meal - limits CHO, eats more meat/ vegetables; + snacking on nuts/ fruit/ sometimes chips; eats mostly at home, drinks coffee, water    Not currently consistently exercising, but has membership to [facility]    Last Eye Exam: September 2017  Last Podiatry Exam: 2015    REVIEW OF SYSTEMS  General: no weakness; c/o fatigue - taking naps during day  Eyes: no blurry vision or eye pain.    Cardiac: no chest pain or palpitations.   Respiratory: no cough or dyspnea.   GI: no abdominal pain or nausea.   Skin: no rashes, wounds, or insulin injection site reactions.   Neuro: no numbness or tingling; no  "dizziness  Endocrine: no polyuria, polydipsia, polyphagia.   Psych: sleeping well if does not nap during day, no anxiety or depression.   MS: chronic right hip, lower back pain at times.     Vital Signs  /76 (BP Location: Left arm, Patient Position: Sitting, BP Method: Medium (Manual))   Pulse 72   Ht 5' 4" (1.626 m)   Wt 77.8 kg (171 lb 8.3 oz)   BMI 29.44 kg/m²     Hemoglobin A1C   Date Value Ref Range Status   02/25/2019 8.8 (H) 4.0 - 5.6 % Final     Comment:     ADA Screening Guidelines:  5.7-6.4%  Consistent with prediabetes  >or=6.5%  Consistent with diabetes  High levels of fetal hemoglobin interfere with the HbA1C  assay. Heterozygous hemoglobin variants (HbS, HgC, etc)do  not significantly interfere with this assay.   However, presence of multiple variants may affect accuracy.     01/05/2019 8.1 (H) 4.0 - 5.6 % Final     Comment:     ADA Screening Guidelines:  5.7-6.4%  Consistent with prediabetes  >or=6.5%  Consistent with diabetes  High levels of fetal hemoglobin interfere with the HbA1C  assay. Heterozygous hemoglobin variants (HbS, HgC, etc)do  not significantly interfere with this assay.   However, presence of multiple variants may affect accuracy.     08/22/2018 7.6 (H) 4.0 - 5.6 % Final     Comment:     ADA Screening Guidelines:  5.7-6.4%  Consistent with prediabetes  >or=6.5%  Consistent with diabetes  High levels of fetal hemoglobin interfere with the HbA1C  assay. Heterozygous hemoglobin variants (HbS, HgC, etc)do  not significantly interfere with this assay.   However, presence of multiple variants may affect accuracy.         Chemistry        Component Value Date/Time     02/25/2019 0903    K 4.1 02/25/2019 0903     02/25/2019 0903    CO2 29 02/25/2019 0903    BUN 14 02/25/2019 0903    CREATININE 0.8 02/25/2019 0903     (H) 02/25/2019 0903        Component Value Date/Time    CALCIUM 9.8 02/25/2019 0903    ALKPHOS 72 02/25/2019 0903    AST 22 02/25/2019 0903    ALT 23 " 02/25/2019 0903    BILITOT 1.2 (H) 02/25/2019 0903        Lab Results   Component Value Date    CHOL 142 02/25/2019    CHOL 213 (H) 01/04/2019    CHOL 142 05/15/2017     Lab Results   Component Value Date    HDL 52 02/25/2019    HDL 52 01/04/2019    HDL 49 05/15/2017     Lab Results   Component Value Date    LDLCALC 56.6 (L) 02/25/2019    LDLCALC Invalid, Trig>400.0 01/04/2019    LDLCALC 64.2 05/15/2017     Lab Results   Component Value Date    TRIG 167 (H) 02/25/2019    TRIG 506 (H) 01/04/2019    TRIG 144 05/15/2017     Lab Results   Component Value Date    TSH 1.926 02/25/2019     Lab Results   Component Value Date    MICALBCREAT 29.3 08/22/2018     PHYSICAL EXAMINATION  Constitutional: Appears well, no distress  Neck: Supple, trachea midline.   Respiratory:  even and unlabored.  Lymph: no edema.   Skin: warm and dry; no insulin site reactions observed.  Neuro: patient alert and cooperative.  Feet: appropriate footwear;   Last 1/2019    Assessment and Plan  1. Diabetic peripheral neuropathy associated with type 2 diabetes mellitus  - takes ASA, ARB, statin    D/c glimepiride   Start novolog 9 units w/ meals plus scale 180-230+2, etc  Cut lantus 30 units nightly  Continue metformin 1000 mg bid  Continue trulicity 1.5 mg weekly     BG monitoring 4 times a day max      2. Type 2 diabetes mellitus with diabetic polyneuropathy, with long-term current use of insulin  Optimize bg will help condition    3. Hyperlipidemia associated with type 2 diabetes mellitus  Lab Results   Component Value Date    LDLCALC 56.6 (L) 02/25/2019     At goal    4. Hypertension associated with diabetes  Controlled, continue med(s)   5. Hypothyroidism, acquired  Lab Results   Component Value Date    TSH 1.926 02/25/2019     At goal  Refill for rx levothyroxine 25 mcg daily empty stomach     6. TIA (transient ischemic attack)  F/u with pcp    7. Vitamin D deficiency disease  Continue ergo        Follow-up in about 4 months (around 7/4/2019).    Labs at [facility] lab prior to appt

## 2019-03-04 NOTE — PATIENT INSTRUCTIONS
Snacks can be an important part of a balanced, healthy meal plan. They allow you to eat more frequently, feeling full and satisfied throughout the day. Also, they allow you to spread carbohydrates evenly, which may stabilize blood sugars.  Plus, snacks are enjoyable!     The amount of carbohydrate needed at snacks varies. Generally, about 15-30 grams of carbohydrate per snack is recommended.  Below you will find some tasty treats.       0-5 gm carb   Crystal Light   Vitamin Water Zero   Herbal tea, unsweetened   2 tsp peanut butter on celery   1./2 cup sugar-free jell-o   1 sugar-free popsicle   ¼ cup blueberries   8oz Blue Rochelle unsweetened almond milk   5 baby carrots & celery sticks, cucumbers, bell peppers dipped in ¼ cup salsa, 2Tbsp light ranch dressing or 2Tbsp plain Greek yogurt   10 Goldfish crackers   ½ oz low-fat cheese or string cheese   1 closed handful of nuts, unsalted   1 Tbsp of sunflower seeds, unsalted   1 cup Smart Pop popcorn   1 whole grain brown rice cake        15 gm carb   1 small piece of fruit or ½ banana or 1/2 cup lite canned fruit   3 drea cracker squares   3 cups Smart Pop popcorn, top spray butter, Kelley lite salt or cinnamon and Truvia   5 Vanilla Wafers   ½ cup low fat, no added sugar ice cream or frozen yogurt (Blue bell, Blue Bunny, Weight Watchers, Skinny Cow)   ½ turkey, ham, or chicken sandwich   ½ c fruit with ½ c Cottage cheese   4-6 unsalted wheat crackers with 1 oz low fat cheese or 1 tbsp peanut butter    30-45 goldfish crackers (depending on flavor)    7-8 Oriental orthodox mini brown rice cakes (caramel, apple cinnamon, chocolate)    12 Oriental orthodox mini brown rice cakes (cheddar, bbq, ranch)    1/3 cup hummus dip with raw veg   1/2 whole wheat reece, 1Tbsp hummus   Mini Pizza (1/2 whole wheat English muffin, low-fat  cheese, tomato sauce)   100 calorie snack pack (Oreo, Chips Ahoy, Ritz Mix, Baked Cheetos)   4-6 oz. light or Greek Style yogurt  (Bonilla, Bryson, Yanique, [facility])   ½ cup sugar-free pudding     6 in. wheat tortilla or reece oven toasted chips (topped with spray butter flavoring, cinnamon, Truvia OR spray butter, garlic powder, chili powder)    18 BBQ Popchips (available at Target, Whole Foods, Fresh Market)                   Diabetes Support Group Meetings         Date: Topic:   February 14 Eat Fit ALONDRA/Health Promotion   March 14 Taking Care of Your Smile   April 11 Spring into Healthy Eating/Cooking Demo   May 9 Ease Your Mind with Diabetes   Susie 13 Summer Treats/Cooking Demo   July 11 Eat Fit ALONDRA/Super Market Sweep   August 8 Taking Care of Your Eyes and Feet   Sept 12 Technology/ADA updates   October 10 Recipes & Treats/Cooking Demo   November 14 Heart Health/Pump it up!   December 12 Year-End Close Out        Meetings are held in the [facility] (A) of the [facility] located at [address] [place] [address]. Please call [phone] for additional information.    Free service, offered every 2nd Thursday of every month! Family members and/or friends are welcome as well!  Support group is for patients with type 1 or type 2 diabetes.    From 3:30p to 4:30p

## 2019-03-18 ENCOUNTER — PATIENT MESSAGE (OUTPATIENT)
Dept: GASTROENTEROLOGY | Facility: CLINIC | Age: 69
End: 2019-03-18

## 2019-03-20 ENCOUNTER — PATIENT OUTREACH (OUTPATIENT)
Dept: ADMINISTRATIVE | Facility: HOSPITAL | Age: 69
End: 2019-03-20

## 2019-03-20 ENCOUNTER — PATIENT MESSAGE (OUTPATIENT)
Dept: DIABETES | Facility: CLINIC | Age: 69
End: 2019-03-20

## 2019-03-20 DIAGNOSIS — Z78.0 POSTMENOPAUSAL: Primary | ICD-10-CM

## 2019-04-10 ENCOUNTER — HOSPITAL ENCOUNTER (OUTPATIENT)
Dept: RADIOLOGY | Facility: HOSPITAL | Age: 69
Discharge: HOME | End: 2019-04-10
Attending: INTERNAL MEDICINE
Payer: MEDICARE

## 2019-04-10 ENCOUNTER — OFFICE VISIT (OUTPATIENT)
Dept: INTERNAL MEDICINE | Facility: CLINIC | Age: 69
End: 2019-04-10
Payer: MEDICARE

## 2019-04-10 VITALS
SYSTOLIC BLOOD PRESSURE: 110 MMHG | HEART RATE: 84 BPM | WEIGHT: 170.44 LBS | BODY MASS INDEX: 29.1 KG/M2 | HEIGHT: 64 IN | TEMPERATURE: 98 F | DIASTOLIC BLOOD PRESSURE: 60 MMHG

## 2019-04-10 DIAGNOSIS — R05.3 CHRONIC COUGH: Primary | ICD-10-CM

## 2019-04-10 DIAGNOSIS — R05.3 CHRONIC COUGH: ICD-10-CM

## 2019-04-10 DIAGNOSIS — J30.89 ENVIRONMENTAL AND SEASONAL ALLERGIES: ICD-10-CM

## 2019-04-10 PROBLEM — I63.9 CVA (CEREBRAL VASCULAR ACCIDENT): Status: RESOLVED | Noted: 2019-01-05 | Resolved: 2019-04-10

## 2019-04-10 PROBLEM — Z12.11 COLON CANCER SCREENING: Status: RESOLVED | Noted: 2019-02-19 | Resolved: 2019-04-10

## 2019-04-10 PROBLEM — E03.9 HYPOTHYROIDISM, ACQUIRED: Chronic | Status: ACTIVE | Noted: 2018-06-05

## 2019-04-10 PROBLEM — R41.82 ALTERED MENTAL STATUS: Status: RESOLVED | Noted: 2019-01-05 | Resolved: 2019-04-10

## 2019-04-10 PROBLEM — E11.42 DIABETIC PERIPHERAL NEUROPATHY ASSOCIATED WITH TYPE 2 DIABETES MELLITUS: Chronic | Status: ACTIVE | Noted: 2017-08-28

## 2019-04-10 PROCEDURE — 99214 PR OFFICE/OUTPT VISIT, EST, LEVL IV, 30-39 MIN: ICD-10-PCS | Mod: S$PBB,,, | Performed by: INTERNAL MEDICINE

## 2019-04-10 PROCEDURE — 71046 X-RAY EXAM CHEST 2 VIEWS: CPT | Mod: TC

## 2019-04-10 PROCEDURE — 99213 OFFICE O/P EST LOW 20 MIN: CPT | Mod: PBBFAC,25 | Performed by: INTERNAL MEDICINE

## 2019-04-10 PROCEDURE — 99214 OFFICE O/P EST MOD 30 MIN: CPT | Mod: S$PBB,,, | Performed by: INTERNAL MEDICINE

## 2019-04-10 PROCEDURE — 71046 X-RAY EXAM CHEST 2 VIEWS: CPT | Mod: 26,,, | Performed by: RADIOLOGY

## 2019-04-10 PROCEDURE — 71046 XR CHEST PA AND LATERAL: ICD-10-PCS | Mod: 26,,, | Performed by: RADIOLOGY

## 2019-04-10 PROCEDURE — 99999 PR PBB SHADOW E&M-EST. PATIENT-LVL III: ICD-10-PCS | Mod: PBBFAC,,, | Performed by: INTERNAL MEDICINE

## 2019-04-10 PROCEDURE — 99999 PR PBB SHADOW E&M-EST. PATIENT-LVL III: CPT | Mod: PBBFAC,,, | Performed by: INTERNAL MEDICINE

## 2019-04-10 RX ORDER — ALBUTEROL SULFATE 90 UG/1
2 AEROSOL, METERED RESPIRATORY (INHALATION) 4 TIMES DAILY
Qty: 1 INHALER | Refills: 0 | Status: SHIPPED | OUTPATIENT
Start: 2019-04-10 | End: 2020-01-16

## 2019-04-10 RX ORDER — LORATADINE 10 MG/1
10 TABLET ORAL DAILY
Refills: 0 | COMMUNITY
Start: 2019-04-10 | End: 2022-05-12

## 2019-04-10 NOTE — PATIENT INSTRUCTIONS
Inhaler Use  The inhaler that you were prescribed contains a potent medicine. It should only be used as directed. The medicine in your inhaler must be breathed deeply into your lungs for it to work. It will not work at all if it only reaches your mouth and throat. Follow the instructions below for best results. And remember to follow your asthma action plan as given to you by your doctor.  1. Keep your inhaler at room temperature.  2. Hold the inhaler so that the part that goes into your mouth is at the bottom.  3. Shake the inhaler well and remove the cap.  4. Breathe out through your mouth to fully empty your lungs.  5. Place the inhaler in your mouth and close your lips tightly around it. (Or hold the inhaler 1 to 2 inches from your open mouth if told to do so by your healthcare provider.)  6. Squeeze the inhaler as you breathe in slowly through your mouth until your lungs are full of air, drawing the medicine deep into your lungs.  7. Hold your breath for 10 seconds, or as long as you can comfortably hold your breath. Then breathe out slowly.  8. If you have been advised to take 2 puffs, wait 5 minutes, then repeat steps 3-7 above. Waiting 5 minutes between puffs will alow the medicine to open up your lungs so the second puff can get deeper into the lungs. Replace the cap when done.  9. If you were prescribed both a steroid inhaler and a bronchodilator inhaler, use the bronchodilator first to open the air passages. Wait 5 minutes, then use the steroid inhaler.  10. Rinse your mouth with water and spit it out (especially after using a steroid inhaler). This is very important if you are using a steroid inhaler to prevent thrush, a mild yeast infection of the mouth and back of the throat.  11. A special chamber (spacer) may be prescribed that attaches to your inhaler. This increases the amount of medicine that goes to your lungs. It also improves how well each treatment works. Ask your doctor about this if you  did not receive one.    Keep it clean  Remove the metal canister and do not immerse it in water. Then clean the plastic mouthpiece, cap, and spacer if you have one, by rinsing them well in warm running water for 30 to 60 seconds. Shake off excess water and allow the mouthpiece to dry completely (overnight is recommended). This should be done once a week. If you need the inhaler before the mouthpiece is dry, shake off excess water, replace canister, and test spray 2 times (away from the face).  Warning  A steroid inhaler is used to prevent an asthma attack. Do not use this to treat an acute wheezing episode. Use only bronchodilator inhalers (quick relief) to treat an acute asthma attack.  If you find that your medicine is not working and you need to use it more often than prescribed, this could be a sign that your asthma is getting worse. Go to the emergency room or urgent care right away. An asthma attack is easiest to treat in the early stages before it becomes severe.  When to seek medical advice  Get prompt medical attention if any of the following occur:  · Increased wheezing or shortness of breath  · Need to use your inhalers more often than usual without relief  · Fever of 100.4°F (38°C) or higher, or as directed by your healthcare provider  · Coughing up lots of dark-colored or bloody sputum (mucus)  · Chest pain with each breath  · Blue lips or fingernails  · Peak flow reading less than 50% of your normal best  Date Last Reviewed: 12/2/2015  © 6996-4054 [employer]. [address] [place] [address], [place] [address]. All rights reserved. This information is not intended as a substitute for professional medical care. Always follow your healthcare professional's instructions.

## 2019-04-10 NOTE — PROGRESS NOTES
Linda Fong presents today to urgent care for:  Cough (months) and Nasal Congestion (clear)      Cough   This is a chronic problem. Episode onset: almost 5 months ago. The problem has been waxing and waning. The problem occurs every few hours. The cough is productive of sputum. Pertinent negatives include no chest pain, chills, ear congestion, ear pain, fever, hemoptysis, nasal congestion, postnasal drip, rhinorrhea, shortness of breath, sweats or wheezing. The symptoms are aggravated by pollens. She has tried OTC cough suppressant for the symptoms. The treatment provided mild relief. Her past medical history is significant for environmental allergies. There is no history of asthma, bronchiectasis, bronchitis, COPD or pneumonia.       Past medical, social, family and surgical history was reviewed and updated today as needed. See encounter for details.     Review of Systems   Constitutional: Negative for chills and fever.   HENT: Negative for ear pain, postnasal drip and rhinorrhea.    Respiratory: Positive for cough and sputum production (clear only). Negative for hemoptysis, shortness of breath and wheezing.    Cardiovascular: Negative for chest pain.   Skin: Negative.    Endo/Heme/Allergies: Positive for environmental allergies.       Vitals:    04/10/19 0929   BP: 110/60   Pulse: 84   Temp: 98.4 °F (36.9 °C)   Body mass index is 29.25 kg/m².   Physical Exam   Constitutional: She is oriented to person, place, and time. She appears well-developed and well-nourished.   HENT:   Head: Normocephalic and atraumatic.   Nose: Nose normal.   Mouth/Throat: Oropharynx is clear and moist.   Eyes: Pupils are equal, round, and reactive to light. EOM are normal.   Neck: Neck supple. No JVD present. No thyromegaly present.   Cardiovascular: Normal rate, regular rhythm, normal heart sounds and intact distal pulses.   Pulmonary/Chest: Effort normal and breath sounds normal. She has no wheezes. She has no rales.   Abdominal:  Soft. Bowel sounds are normal. She exhibits no mass. There is no tenderness.   Musculoskeletal: Normal range of motion.   Lymphadenopathy:     She has no cervical adenopathy.   Neurological: She is alert and oriented to person, place, and time. She has normal reflexes.   Skin: Skin is warm and dry. No rash noted.   Psychiatric: She has a normal mood and affect. Her behavior is normal.   Vitals reviewed.       Assessment/plan:   1. Chronic cough - new issue, needs additional evaluation.  Likely reactive airway thought I didn't hear any wheezing today.    - X-Ray Chest PA And Lateral; Future (Will contact via [employer] with results later today or tomorrow)  Trial of :  - albuterol (PROVENTIL/VENTOLIN HFA) 90 mcg/actuation inhaler; Inhale 2 puffs into the lungs 4 (four) times daily. for 14 days  Dispense: 1 Inhaler; Refill: 0  - loratadine (CLARITIN) 10 mg tablet; Take 1 tablet (10 mg total) by mouth once daily. for 14 days; Refill: 0  If this doesn't resolve or if Xray indicates will have patient change therapy and/or follow up in pulmonary.     No follow-ups on file.  All risks and benefits of medications discussed with the patient today in detail. Pt. Voiced understanding and was provided with patient educational materials regarding these medications and encouraged to read this material and call the office with any questions or concerns.

## 2019-05-07 ENCOUNTER — PATIENT MESSAGE (OUTPATIENT)
Dept: DERMATOLOGY | Facility: CLINIC | Age: 69
End: 2019-05-07

## 2019-05-07 ENCOUNTER — TELEPHONE (OUTPATIENT)
Dept: DERMATOLOGY | Facility: CLINIC | Age: 69
End: 2019-05-07

## 2019-05-07 NOTE — TELEPHONE ENCOUNTER
----- Message from Rossana Alatorrealee sent at 5/7/2019  9:42 AM CDT -----  Contact: pt at [phone]  cell phone  Needs Advice    Reason for call:Pt has a blood blister on her forearm since Sunday.  Thinks it may be cancer.  No hx of skin cancer.  No insect bites.  Leaving town Thursday and wants ti checked before she leaves.        Communication Preference:call pt at above number.    Additional Information:

## 2019-05-16 DIAGNOSIS — E55.9 VITAMIN D DEFICIENCY DISEASE: ICD-10-CM

## 2019-05-16 RX ORDER — ERGOCALCIFEROL 1.25 MG/1
CAPSULE ORAL
Qty: 3 CAPSULE | Refills: 2 | Status: SHIPPED | OUTPATIENT
Start: 2019-05-16 | End: 2020-04-20

## 2019-05-28 ENCOUNTER — OFFICE VISIT (OUTPATIENT)
Dept: INTERNAL MEDICINE | Facility: CLINIC | Age: 69
End: 2019-05-28
Payer: MEDICARE

## 2019-05-28 VITALS
HEART RATE: 87 BPM | BODY MASS INDEX: 28.51 KG/M2 | HEIGHT: 64 IN | DIASTOLIC BLOOD PRESSURE: 66 MMHG | WEIGHT: 167 LBS | SYSTOLIC BLOOD PRESSURE: 120 MMHG

## 2019-05-28 DIAGNOSIS — E78.5 HYPERLIPIDEMIA ASSOCIATED WITH TYPE 2 DIABETES MELLITUS: Chronic | ICD-10-CM

## 2019-05-28 DIAGNOSIS — M85.80 OSTEOPENIA, UNSPECIFIED LOCATION: ICD-10-CM

## 2019-05-28 DIAGNOSIS — I15.2 HYPERTENSION ASSOCIATED WITH DIABETES: Primary | Chronic | ICD-10-CM

## 2019-05-28 DIAGNOSIS — R79.0 LOW MAGNESIUM LEVEL: ICD-10-CM

## 2019-05-28 DIAGNOSIS — E11.59 HYPERTENSION ASSOCIATED WITH DIABETES: Primary | Chronic | ICD-10-CM

## 2019-05-28 DIAGNOSIS — E11.69 HYPERLIPIDEMIA ASSOCIATED WITH TYPE 2 DIABETES MELLITUS: Chronic | ICD-10-CM

## 2019-05-28 PROCEDURE — 99999 PR PBB SHADOW E&M-EST. PATIENT-LVL III: ICD-10-PCS | Mod: PBBFAC,,, | Performed by: INTERNAL MEDICINE

## 2019-05-28 PROCEDURE — 99214 PR OFFICE/OUTPT VISIT, EST, LEVL IV, 30-39 MIN: ICD-10-PCS | Mod: S$PBB,,, | Performed by: INTERNAL MEDICINE

## 2019-05-28 PROCEDURE — 99213 OFFICE O/P EST LOW 20 MIN: CPT | Mod: PBBFAC | Performed by: INTERNAL MEDICINE

## 2019-05-28 PROCEDURE — 99214 OFFICE O/P EST MOD 30 MIN: CPT | Mod: S$PBB,,, | Performed by: INTERNAL MEDICINE

## 2019-05-28 PROCEDURE — 99999 PR PBB SHADOW E&M-EST. PATIENT-LVL III: CPT | Mod: PBBFAC,,, | Performed by: INTERNAL MEDICINE

## 2019-05-28 NOTE — PROGRESS NOTES
"Subjective:       Patient ID: Linda Fong is a 69 y.o. female.    Chief Complaint: Follow-up    HPI    Patient is accompanied by her  Juan Jose.    Last visit with me January 2019.  Since then had colonoscopy, seen by Dermatology, Endocrinology, and Internal Medicine.  Upcoming appointment with lab and with Endocrinology.    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 occasionally with vertigo, needs to do Epley maneuver but then this resolves the symptoms.    Review of Systems   All other systems reviewed and are negative.      Objective:      Physical Exam   Constitutional: No distress.   HENT:   Mouth/Throat: Oropharynx is clear and moist. No oropharyngeal exudate.   Neck: Normal range of motion. No thyromegaly present.   Cardiovascular: Normal rate, regular rhythm and normal heart sounds.   Pulmonary/Chest: Effort normal and breath sounds normal. No stridor. She has no wheezes. She has no rales.   Abdominal: Soft. There is no tenderness. There is no guarding.   Lymphadenopathy:     She has no cervical adenopathy.   Psychiatric: She has a normal mood and affect. Her behavior is normal.   Nursing note and vitals reviewed.      Vitals:    05/28/19 0929   BP: 120/66   BP Location: Right arm   Patient Position: Sitting   BP Method: Large (Manual)   Pulse: 87   Weight: 75.8 kg (167 lb)   Height: 5' 4" (1.626 m)     Body mass index is 28.67 kg/m².    RESULTS: Reviewed labs from last 12 months    Assessment:       1. Hypertension associated with diabetes    2. Hyperlipidemia associated with type 2 diabetes mellitus    3. Low magnesium level    4. Osteopenia, unspecified location        Plan:   Linda was seen today for follow-up.    Diagnoses and all orders for this visit:    Hypertension associated with diabetes:  Prior diagnosis, stable, well controlled on current management. No changes at this time, will continue to monitor.     Hyperlipidemia associated with type 2 diabetes mellitus:  Prior " diagnosis, stable, well controlled on current management. No changes at this time, will continue to monitor.     Low magnesium level:  New dx, possibly due to diuretic, recheck with next labs.  -     Magnesium; Future    Osteopenia, unspecified location:  Prior dx in 2017, did not recommend treatment; recheck next year, continue Ca & VitD with exercise.    Follow up in about 6 months (around 11/28/2019) for follow up visit.  Manuel Yanes MD  Internal Medicine    Portions of this note were completed using medical dictation software. Please excuse typographical or syntax errors that were missed on review.

## 2019-07-01 ENCOUNTER — LAB VISIT (OUTPATIENT)
Dept: LAB | Facility: HOSPITAL | Age: 69
End: 2019-07-01
Payer: MEDICARE

## 2019-07-01 DIAGNOSIS — E11.42 TYPE 2 DIABETES MELLITUS WITH DIABETIC POLYNEUROPATHY, WITH LONG-TERM CURRENT USE OF INSULIN: ICD-10-CM

## 2019-07-01 DIAGNOSIS — Z79.4 TYPE 2 DIABETES MELLITUS WITH DIABETIC POLYNEUROPATHY, WITH LONG-TERM CURRENT USE OF INSULIN: ICD-10-CM

## 2019-07-01 DIAGNOSIS — E11.42 DIABETIC PERIPHERAL NEUROPATHY ASSOCIATED WITH TYPE 2 DIABETES MELLITUS: ICD-10-CM

## 2019-07-01 LAB
ALBUMIN/CREAT UR: 32.6 UG/MG (ref 0–30)
CREAT UR-MCNC: 43 MG/DL (ref 15–325)
MICROALBUMIN UR DL<=1MG/L-MCNC: 14 UG/ML

## 2019-07-01 PROCEDURE — 82043 UR ALBUMIN QUANTITATIVE: CPT

## 2019-07-08 ENCOUNTER — OFFICE VISIT (OUTPATIENT)
Dept: ENDOCRINOLOGY | Facility: CLINIC | Age: 69
End: 2019-07-08
Payer: MEDICARE

## 2019-07-08 VITALS
WEIGHT: 168.19 LBS | HEART RATE: 78 BPM | BODY MASS INDEX: 28.71 KG/M2 | HEIGHT: 64 IN | SYSTOLIC BLOOD PRESSURE: 112 MMHG | DIASTOLIC BLOOD PRESSURE: 72 MMHG

## 2019-07-08 DIAGNOSIS — E11.69 HYPERLIPIDEMIA ASSOCIATED WITH TYPE 2 DIABETES MELLITUS: Chronic | ICD-10-CM

## 2019-07-08 DIAGNOSIS — E04.2 MULTINODULAR GOITER: ICD-10-CM

## 2019-07-08 DIAGNOSIS — E11.42 TYPE 2 DIABETES MELLITUS WITH DIABETIC POLYNEUROPATHY, WITH LONG-TERM CURRENT USE OF INSULIN: Chronic | ICD-10-CM

## 2019-07-08 DIAGNOSIS — E11.42 DIABETIC PERIPHERAL NEUROPATHY ASSOCIATED WITH TYPE 2 DIABETES MELLITUS: Primary | Chronic | ICD-10-CM

## 2019-07-08 DIAGNOSIS — E78.5 HYPERLIPIDEMIA ASSOCIATED WITH TYPE 2 DIABETES MELLITUS: Chronic | ICD-10-CM

## 2019-07-08 DIAGNOSIS — I15.2 HYPERTENSION ASSOCIATED WITH DIABETES: Chronic | ICD-10-CM

## 2019-07-08 DIAGNOSIS — Z79.4 TYPE 2 DIABETES MELLITUS WITH DIABETIC POLYNEUROPATHY, WITH LONG-TERM CURRENT USE OF INSULIN: Chronic | ICD-10-CM

## 2019-07-08 DIAGNOSIS — E11.59 HYPERTENSION ASSOCIATED WITH DIABETES: Chronic | ICD-10-CM

## 2019-07-08 DIAGNOSIS — E03.9 HYPOTHYROIDISM, ACQUIRED: Chronic | ICD-10-CM

## 2019-07-08 DIAGNOSIS — E55.9 VITAMIN D DEFICIENCY DISEASE: ICD-10-CM

## 2019-07-08 PROCEDURE — 99214 PR OFFICE/OUTPT VISIT, EST, LEVL IV, 30-39 MIN: ICD-10-PCS | Mod: S$PBB,,, | Performed by: NURSE PRACTITIONER

## 2019-07-08 PROCEDURE — 99214 OFFICE O/P EST MOD 30 MIN: CPT | Mod: S$PBB,,, | Performed by: NURSE PRACTITIONER

## 2019-07-08 PROCEDURE — 99214 OFFICE O/P EST MOD 30 MIN: CPT | Mod: PBBFAC | Performed by: NURSE PRACTITIONER

## 2019-07-08 PROCEDURE — 99999 PR PBB SHADOW E&M-EST. PATIENT-LVL IV: CPT | Mod: PBBFAC,,, | Performed by: NURSE PRACTITIONER

## 2019-07-08 PROCEDURE — 99999 PR PBB SHADOW E&M-EST. PATIENT-LVL IV: ICD-10-PCS | Mod: PBBFAC,,, | Performed by: NURSE PRACTITIONER

## 2019-07-08 RX ORDER — INSULIN GLARGINE 100 [IU]/ML
INJECTION, SOLUTION SUBCUTANEOUS
Qty: 3 BOX | Refills: 3
Start: 2019-07-08 | End: 2020-03-11

## 2019-07-08 RX ORDER — INSULIN ASPART 100 [IU]/ML
INJECTION, SOLUTION INTRAVENOUS; SUBCUTANEOUS
Qty: 3 BOX | Refills: 3
Start: 2019-07-08 | End: 2020-01-16

## 2019-07-08 NOTE — PROGRESS NOTES
CC: Patient is here for management of Type 2 diabetes and review of medical conditions as listed in visit diagnosis. She is accompanied by her .      HPI: Ms. Linda Fong is a 69 y.o. female who was diagnosed with Type 2 in 1993, on employment physical exam with labs. She has never been hospitalized r/t DM. She suffered a ICH after a fall years ago and has had decreased memory since then.  Previously tried Novolog AC.  Denies personal history of pancreatitis or pancreatic cancer. Denies family hx of thyroid cancer.     Seen by FARZANEH Tomas DNP, MANAV Hutton NP.   Pt was last seen by me in 3/2019.   Pt had TIA in Jan 2019.  a1c improved, 8.8% to 7.9%   Lab Results   Component Value Date    HGBA1C 7.9 (H) 07/01/2019    HGBA1C 7.9 (H) 07/01/2019     CURRENT DIABETIC MEDS: Lantus 35 units nightly, novolog 10-12 units ac, Metformin 1,000 mg bid, Trulicity 1.5 mg weekly  Uses Vials or Pens: Pens  Rarely missing doses of diabetes medications.     Type of Glucose Meter: Accu-Chek Compact    Checks BG once daily     No hypoglycemia.     Diet: 4 mini meals daily with snackings, BK oatmeal; RANJANA open faced sandwich/ hamburger; DIN biggest meal home cooked meal - limits CHO, eats more meat/ vegetables; + snacking on nuts/ fruit/ sometimes chips; eats mostly at home, drinks coffee, water    Not currently consistently exercising, but has membership to [employer] [employer] [facility]    Diabetes Management Status    Statin: Taking  ACE/ARB: Taking    Screening or Prevention Patient's value Goal Complete/Controlled?   HgA1C Testing and Control   Lab Results   Component Value Date    HGBA1C 7.9 (H) 07/01/2019    HGBA1C 7.9 (H) 07/01/2019      Annually/Less than 8% Yes   Lipid profile : 02/25/2019 Annually Yes   LDL control Lab Results   Component Value Date    LDLCALC 56.6 (L) 02/25/2019    Annually/Less than 100 mg/dl  Yes   Nephropathy screening Lab Results   Component Value Date    LABMICR 14.0 07/01/2019     Lab Results   Component  "Value Date    PROTEINUA Negative 01/05/2019    Annually Yes   Blood pressure BP Readings from Last 1 Encounters:   07/08/19 112/72    Less than 140/90 Yes   Dilated retinal exam : 11/09/2018 Annually Yes   Foot exam   : 03/04/2019 Annually Yes     REVIEW OF SYSTEMS  General: no weakness; c/o fatigue   Eyes: no blurry vision or eye pain.    Cardiac: no chest pain or palpitations.   Respiratory: no cough or dyspnea.   GI: no abdominal pain or nausea.   Skin: no rashes, wounds, or insulin injection site reactions.   Neuro: no numbness or tingling; no dizziness  Endocrine: no polyuria, polydipsia, polyphagia.   Psych: sleeping well if does not nap during day, no anxiety or depression.   MS: chronic right hip, lower back pain at times.     Vital Signs  /72 (BP Location: Left arm, Patient Position: Sitting, BP Method: Medium (Manual))   Pulse 78   Ht 5' 4" (1.626 m)   Wt 76.3 kg (168 lb 3.4 oz)   BMI 28.87 kg/m²     Hemoglobin A1C   Date Value Ref Range Status   07/01/2019 7.9 (H) 4.0 - 5.6 % Final     Comment:     ADA Screening Guidelines:  5.7-6.4%  Consistent with prediabetes  >or=6.5%  Consistent with diabetes  High levels of fetal hemoglobin interfere with the HbA1C  assay. Heterozygous hemoglobin variants (HbS, HgC, etc)do  not significantly interfere with this assay.   However, presence of multiple variants may affect accuracy.     07/01/2019 7.9 (H) 4.0 - 5.6 % Final     Comment:     ADA Screening Guidelines:  5.7-6.4%  Consistent with prediabetes  >or=6.5%  Consistent with diabetes  High levels of fetal hemoglobin interfere with the HbA1C  assay. Heterozygous hemoglobin variants (HbS, HgC, etc)do  not significantly interfere with this assay.   However, presence of multiple variants may affect accuracy.     02/25/2019 8.8 (H) 4.0 - 5.6 % Final     Comment:     ADA Screening Guidelines:  5.7-6.4%  Consistent with prediabetes  >or=6.5%  Consistent with diabetes  High levels of fetal hemoglobin interfere " with the HbA1C  assay. Heterozygous hemoglobin variants (HbS, HgC, etc)do  not significantly interfere with this assay.   However, presence of multiple variants may affect accuracy.         Chemistry        Component Value Date/Time     02/25/2019 0903    K 4.1 02/25/2019 0903     02/25/2019 0903    CO2 29 02/25/2019 0903    BUN 14 02/25/2019 0903    CREATININE 0.8 02/25/2019 0903     (H) 02/25/2019 0903        Component Value Date/Time    CALCIUM 9.8 02/25/2019 0903    ALKPHOS 72 02/25/2019 0903    AST 22 02/25/2019 0903    ALT 23 02/25/2019 0903    BILITOT 1.2 (H) 02/25/2019 0903        Lab Results   Component Value Date    CHOL 142 02/25/2019    CHOL 213 (H) 01/04/2019    CHOL 142 05/15/2017     Lab Results   Component Value Date    HDL 52 02/25/2019    HDL 52 01/04/2019    HDL 49 05/15/2017     Lab Results   Component Value Date    LDLCALC 56.6 (L) 02/25/2019    LDLCALC Invalid, Trig>400.0 01/04/2019    LDLCALC 64.2 05/15/2017     Lab Results   Component Value Date    TRIG 167 (H) 02/25/2019    TRIG 506 (H) 01/04/2019    TRIG 144 05/15/2017     Lab Results   Component Value Date    TSH 2.887 07/01/2019     Lab Results   Component Value Date    MICALBCREAT 32.6 (H) 07/01/2019     PHYSICAL EXAMINATION  Constitutional: Appears well, no distress  Neck: Supple, trachea midline.   Respiratory:  even and unlabored.  Lymph: no edema.   Skin: warm and dry; no insulin site reactions observed.  Neuro: patient alert and cooperative.  Feet: appropriate footwear  Last 1/2019    Assessment and Plan  1. Diabetic peripheral neuropathy associated with type 2 diabetes mellitus  Hemoglobin A1c   2. Type 2 diabetes mellitus with diabetic polyneuropathy, with long-term current use of insulin  Hemoglobin A1c   3. Hyperlipidemia associated with type 2 diabetes mellitus     4. Hypertension associated with diabetes     5. Hypothyroidism, acquired     6. Multinodular goiter     7. Vitamin D deficiency disease     1. F/u  in 3 mos   a1c next time  a1c has improved   a1c goal less than 7.5%  Continue trulicity 1.5 mg weekly   Continue metformin 1000 mg bid   Change basal 38 units at night  Change meal insulin to 12 units w/ meals plus scale 150-200+2, etc  Snack dose 4 units     2. Optimize bg will help with condition   3.   Lab Results   Component Value Date    LDLCALC 56.6 (L) 02/25/2019     At goal   4. Controlled, continue med(s)  5.   Lab Results   Component Value Date    TSH 2.887 07/01/2019     At goal  On lt4 25 mcg daily  6. U/s q 1 -2 years  7. On supplement             No follow-ups on file.   Labs at [facility] main [facility] lab prior to appt

## 2019-07-08 NOTE — PATIENT INSTRUCTIONS
Snacks can be an important part of a balanced, healthy meal plan. They allow you to eat more frequently, feeling full and satisfied throughout the day. Also, they allow you to spread carbohydrates evenly, which may stabilize blood sugars.  Plus, snacks are enjoyable!     The amount of carbohydrate needed at snacks varies. Generally, about 15-30 grams of carbohydrate per snack is recommended.  Below you will find some tasty treats.       0-5 gm carb   Crystal Light   Vitamin Water Zero   Herbal tea, unsweetened   2 tsp peanut butter on celery   1./2 cup sugar-free jell-o   1 sugar-free popsicle   ¼ cup blueberries   8oz Blue Rochelle unsweetened almond milk   5 baby carrots & celery sticks, cucumbers, bell peppers dipped in ¼ cup salsa, 2Tbsp light ranch dressing or 2Tbsp plain Greek yogurt   10 Goldfish crackers   ½ oz low-fat cheese or string cheese   1 closed handful of nuts, unsalted   1 Tbsp of sunflower seeds, unsalted   1 cup Smart Pop popcorn   1 whole grain brown rice cake        15 gm carb   1 small piece of fruit or ½ banana or 1/2 cup lite canned fruit   3 drea cracker squares   3 cups Smart Pop popcorn, top spray butter, Kelley lite salt or cinnamon and Truvia   5 Vanilla Wafers   ½ cup low fat, no added sugar ice cream or frozen yogurt (Blue bell, Blue Bunny, Weight Watchers, Skinny Cow)   ½ turkey, ham, or chicken sandwich   ½ c fruit with ½ c Cottage cheese   4-6 unsalted wheat crackers with 1 oz low fat cheese or 1 tbsp peanut butter    30-45 goldfish crackers (depending on flavor)    7-8 Synagogue mini brown rice cakes (caramel, apple cinnamon, chocolate)    12 Synagogue mini brown rice cakes (cheddar, bbq, ranch)    1/3 cup hummus dip with raw veg   1/2 whole wheat reece, 1Tbsp hummus   Mini Pizza (1/2 whole wheat English muffin, low-fat  cheese, tomato sauce)   100 calorie snack pack (Oreo, Chips Ahoy, Ritz Mix, Baked Cheetos)   4-6 oz. light or Greek Style yogurt  (Bonilla, Bryson, Yanique, [facility])   ½ cup sugar-free pudding     6 in. wheat tortilla or reece oven toasted chips (topped with spray butter flavoring, cinnamon, Truvia OR spray butter, garlic powder, chili powder)    18 BBQ Popchips (available at Target, Whole Foods, Fresh Market)                   Diabetes Support Group Meetings         Date: Topic:   February 14 Eat Fit ALONDRA/Health Promotion   March 14 Taking Care of Your Smile   April 11 Spring into Healthy Eating/Cooking Demo   May 9 Ease Your Mind with Diabetes   Susie 13 Summer Treats/Cooking Demo   July 11 Eat Fit ALONDRA/Super Market Sweep   August 8 Taking Care of Your Eyes and Feet   Sept 12 Technology/ADA updates   October 10 Recipes & Treats/Cooking Demo   November 14 Heart Health/Pump it up!   December 12 Year-End Close Out        Meetings are held in the [facility] (A) of the [facility] located at [address] [place] [address]. Please call [phone] for additional information.    Free service, offered every 2nd Thursday of every month! Family members and/or friends are welcome as well!  Support group is for patients with type 1 or type 2 diabetes.    From 3:30p to 4:30p

## 2019-08-02 ENCOUNTER — PATIENT MESSAGE (OUTPATIENT)
Dept: ENDOCRINOLOGY | Facility: CLINIC | Age: 69
End: 2019-08-02

## 2019-12-27 DIAGNOSIS — E11.9 TYPE 2 DIABETES MELLITUS WITHOUT COMPLICATION, UNSPECIFIED WHETHER LONG TERM INSULIN USE: ICD-10-CM

## 2020-01-09 ENCOUNTER — LAB VISIT (OUTPATIENT)
Dept: LAB | Facility: HOSPITAL | Age: 70
End: 2020-01-09
Payer: MEDICARE

## 2020-01-09 DIAGNOSIS — Z79.4 TYPE 2 DIABETES MELLITUS WITH DIABETIC POLYNEUROPATHY, WITH LONG-TERM CURRENT USE OF INSULIN: Chronic | ICD-10-CM

## 2020-01-09 DIAGNOSIS — E11.42 TYPE 2 DIABETES MELLITUS WITH DIABETIC POLYNEUROPATHY, WITH LONG-TERM CURRENT USE OF INSULIN: Chronic | ICD-10-CM

## 2020-01-09 DIAGNOSIS — E11.42 DIABETIC PERIPHERAL NEUROPATHY ASSOCIATED WITH TYPE 2 DIABETES MELLITUS: Chronic | ICD-10-CM

## 2020-01-09 LAB
ESTIMATED AVG GLUCOSE: 183 MG/DL (ref 68–131)
HBA1C MFR BLD HPLC: 8 % (ref 4–5.6)

## 2020-01-09 PROCEDURE — 83036 HEMOGLOBIN GLYCOSYLATED A1C: CPT

## 2020-01-09 PROCEDURE — 36415 COLL VENOUS BLD VENIPUNCTURE: CPT

## 2020-01-16 ENCOUNTER — OFFICE VISIT (OUTPATIENT)
Dept: INTERNAL MEDICINE | Facility: CLINIC | Age: 70
End: 2020-01-16
Payer: MEDICARE

## 2020-01-16 VITALS
SYSTOLIC BLOOD PRESSURE: 122 MMHG | DIASTOLIC BLOOD PRESSURE: 72 MMHG | BODY MASS INDEX: 28.51 KG/M2 | WEIGHT: 167 LBS | HEIGHT: 64 IN

## 2020-01-16 DIAGNOSIS — E11.42 DIABETIC PERIPHERAL NEUROPATHY ASSOCIATED WITH TYPE 2 DIABETES MELLITUS: Chronic | ICD-10-CM

## 2020-01-16 DIAGNOSIS — E11.42 TYPE 2 DIABETES MELLITUS WITH DIABETIC POLYNEUROPATHY, WITH LONG-TERM CURRENT USE OF INSULIN: Primary | Chronic | ICD-10-CM

## 2020-01-16 DIAGNOSIS — E11.59 HYPERTENSION ASSOCIATED WITH DIABETES: Chronic | ICD-10-CM

## 2020-01-16 DIAGNOSIS — I15.2 HYPERTENSION ASSOCIATED WITH DIABETES: Chronic | ICD-10-CM

## 2020-01-16 DIAGNOSIS — Z79.4 TYPE 2 DIABETES MELLITUS WITH DIABETIC POLYNEUROPATHY, WITH LONG-TERM CURRENT USE OF INSULIN: Primary | Chronic | ICD-10-CM

## 2020-01-16 DIAGNOSIS — R41.82 ALTERED MENTAL STATUS, UNSPECIFIED ALTERED MENTAL STATUS TYPE: ICD-10-CM

## 2020-01-16 DIAGNOSIS — E03.9 HYPOTHYROIDISM, ACQUIRED: Chronic | ICD-10-CM

## 2020-01-16 PROCEDURE — 1159F MED LIST DOCD IN RCRD: CPT | Mod: ,,, | Performed by: NURSE PRACTITIONER

## 2020-01-16 PROCEDURE — 99999 PR PBB SHADOW E&M-EST. PATIENT-LVL V: CPT | Mod: PBBFAC,,, | Performed by: NURSE PRACTITIONER

## 2020-01-16 PROCEDURE — 99214 PR OFFICE/OUTPT VISIT, EST, LEVL IV, 30-39 MIN: ICD-10-PCS | Mod: S$PBB,,, | Performed by: NURSE PRACTITIONER

## 2020-01-16 PROCEDURE — 99999 PR PBB SHADOW E&M-EST. PATIENT-LVL V: ICD-10-PCS | Mod: PBBFAC,,, | Performed by: NURSE PRACTITIONER

## 2020-01-16 PROCEDURE — 1159F PR MEDICATION LIST DOCUMENTED IN MEDICAL RECORD: ICD-10-PCS | Mod: ,,, | Performed by: NURSE PRACTITIONER

## 2020-01-16 PROCEDURE — 99214 OFFICE O/P EST MOD 30 MIN: CPT | Mod: S$PBB,,, | Performed by: NURSE PRACTITIONER

## 2020-01-16 PROCEDURE — 99215 OFFICE O/P EST HI 40 MIN: CPT | Mod: PBBFAC | Performed by: NURSE PRACTITIONER

## 2020-01-16 RX ORDER — INSULIN ASPART 100 [IU]/ML
INJECTION, SOLUTION INTRAVENOUS; SUBCUTANEOUS
Qty: 6 VIAL | Refills: 3 | Status: SHIPPED | OUTPATIENT
Start: 2020-01-16 | End: 2020-07-21 | Stop reason: SDUPTHER

## 2020-01-16 RX ORDER — INSULIN ASPART 100 [IU]/ML
INJECTION, SOLUTION INTRAVENOUS; SUBCUTANEOUS
Qty: 3 BOX | Refills: 3
Start: 2020-01-16 | End: 2020-02-24

## 2020-01-16 RX ORDER — LABETALOL 100 MG/1
TABLET, FILM COATED ORAL
COMMUNITY
End: 2021-08-10

## 2020-01-16 NOTE — PATIENT INSTRUCTIONS
Snacks can be an important part of a balanced, healthy meal plan. They allow you to eat more frequently, feeling full and satisfied throughout the day. Also, they allow you to spread carbohydrates evenly, which may stabilize blood sugars.  Plus, snacks are enjoyable!     The amount of carbohydrate needed at snacks varies. Generally, about 15-30 grams of carbohydrate per snack is recommended.  Below you will find some tasty treats.       0-5 gm carb   Crystal Light   Vitamin Water Zero   Herbal tea, unsweetened   2 tsp peanut butter on celery   1./2 cup sugar-free jell-o   1 sugar-free popsicle   ¼ cup blueberries   8oz Blue Rochelle unsweetened almond milk   5 baby carrots & celery sticks, cucumbers, bell peppers dipped in ¼ cup salsa, 2Tbsp light ranch dressing or 2Tbsp plain Greek yogurt   10 Goldfish crackers   ½ oz low-fat cheese or string cheese   1 closed handful of nuts, unsalted   1 Tbsp of sunflower seeds, unsalted   1 cup Smart Pop popcorn   1 whole grain brown rice cake        15 gm carb   1 small piece of fruit or ½ banana or 1/2 cup lite canned fruit   3 drea cracker squares   3 cups Smart Pop popcorn, top spray butter, Kelley lite salt or cinnamon and Truvia   5 Vanilla Wafers   ½ cup low fat, no added sugar ice cream or frozen yogurt (Blue bell, Blue Bunny, Weight Watchers, Skinny Cow)   ½ turkey, ham, or chicken sandwich   ½ c fruit with ½ c Cottage cheese   4-6 unsalted wheat crackers with 1 oz low fat cheese or 1 tbsp peanut butter    30-45 goldfish crackers (depending on flavor)    7-8 Religion mini brown rice cakes (caramel, apple cinnamon, chocolate)    12 Religion mini brown rice cakes (cheddar, bbq, ranch)    1/3 cup hummus dip with raw veg   1/2 whole wheat reece, 1Tbsp hummus   Mini Pizza (1/2 whole wheat English muffin, low-fat  cheese, tomato sauce)   100 calorie snack pack (Oreo, Chips Ahoy, Ritz Mix, Baked Cheetos)   4-6 oz. light or Greek Style yogurt  (Bonilla, Bryson, Yanique, [facility])   ½ cup sugar-free pudding     6 in. wheat tortilla or reece oven toasted chips (topped with spray butter flavoring, cinnamon, Truvia OR spray butter, garlic powder, chili powder)    18 BBQ Popchips (available at Target, Whole Foods, Fresh Market)                   Diabetes Support Group Meetings         Date: Topic:   February 13 Eat Fit ALONDRA/Health Promotion   March 12 Taking Care of Your Smile   April 9 Spring into Healthy Eating/Cooking Demo   May 14 Ease Your Mind with Diabetes   Susie 11 Summer Treats/Cooking Demo   July 9 Eat Fit ALONDRA/Grocery Tour   August 13 Taking Care of Your Eyes and Feet   Sept 10 Chair Exercises/ADA updates   October 8 Recipes & Treats/Cooking Demo   November 12 Heart Health/Pump it up!   December 10 Year-End Close Out Party!        Meetings are held in the [facility] (A) of the [facility] located at [address] [place] [address]. Please call [phone] for additional information.    Free service, offered every 2nd Thursday of every month! Family members and/or friends are welcome as well!  Support group is for patients with type 1 or type 2 diabetes.    From 3:30p to 4:30p

## 2020-01-16 NOTE — PROGRESS NOTES
CC: Patient is here for management of Type 2 diabetes and review of medical conditions as listed in visit diagnosis. She is accompanied by her .      HPI: Ms. Linda Fong is a 69 y.o. female who was diagnosed with Type 2 in 1993, on employment physical exam with labs. She has never been hospitalized r/t DM. She suffered a ICH after a fall years ago and has had decreased memory since then.  Previously tried Novolog AC.  Pt has h/o hypothyroidism, HTN, PN, AMS, TIA like symptoms, ocular HAs,   Sensitive to long gaps of not eating-eggs this am, lunch around 2p.   Today presented with delayed verbal responses, sl sluggish speech, memory fog.  ER visit 1/2019- AMS, work up for stroke, NSTEMI- both negative, MRI of  Brain, EEG done.      Denies personal history of pancreatitis or pancreatic cancer. Denies family hx of thyroid cancer.     Seen by FARZANEH Tomas DNP, MANAV Hutton NP.   Pt was last seen by me in 7/2019.   a1c elevated, stable.    Lab Results   Component Value Date    HGBA1C 8.0 (H) 01/09/2020     CURRENT DIABETIC MEDS: Lantus 38 units nightly, novolog 12 units ac, Metformin 1,000 mg bid, Trulicity 1.5 mg weekly      Uses Vials or Pens: Pens  Rarely missing doses of diabetes medications.     Type of Glucose Meter: Accu-Chek Compact  BG 1-2 x a day    BG 160s -low 20s    No hypoglycemia.     Diet: 4 mini meals daily with snackings, BK oatmeal; RANJANA open faced sandwich/ hamburger; DIN biggest meal home cooked meal - limits CHO, eats more meat/ vegetables; + snacking on nuts/ fruit/ sometimes chips; eats mostly at home, drinks coffee, water    Not currently consistently exercising, but has membership to [employer] [facility]    Diabetes Management Status    Statin: Taking  ACE/ARB: Taking    Screening or Prevention Patient's value Goal Complete/Controlled?   HgA1C Testing and Control   Lab Results   Component Value Date    HGBA1C 8.0 (H) 01/09/2020      Annually/Less than 8% Yes   Lipid profile : 02/25/2019  "Annually Yes   LDL control Lab Results   Component Value Date    LDLCALC 56.6 (L) 02/25/2019    Annually/Less than 100 mg/dl  Yes   Nephropathy screening Lab Results   Component Value Date    LABMICR 14.0 07/01/2019     Lab Results   Component Value Date    PROTEINUA Negative 01/05/2019    Annually Yes   Blood pressure BP Readings from Last 1 Encounters:   01/16/20 122/72    Less than 140/90 Yes   Dilated retinal exam : 11/09/2018 Annually Yes   Foot exam   : 03/04/2019 Annually Yes     REVIEW OF SYSTEMS  General: no weakness; c/o fatigue   Eyes: no blurry vision or eye pain.    Cardiac: no chest pain or palpitations.   Respiratory: no cough or dyspnea.   GI: no abdominal pain or nausea.   Skin: no rashes, wounds, or insulin injection site reactions.   Neuro: no numbness or tingling; no dizziness, + AMS, see above  Endocrine: no polyuria, polydipsia, polyphagia.   Psych: sleeping well if does not nap during day, no anxiety or depression.   MS: chronic right hip, lower back pain at times.     Vital Signs  /72   Ht 5' 4" (1.626 m)   Wt 75.8 kg (167 lb)   BMI 28.67 kg/m²     Hemoglobin A1C   Date Value Ref Range Status   01/09/2020 8.0 (H) 4.0 - 5.6 % Final     Comment:     ADA Screening Guidelines:  5.7-6.4%  Consistent with prediabetes  >or=6.5%  Consistent with diabetes  High levels of fetal hemoglobin interfere with the HbA1C  assay. Heterozygous hemoglobin variants (HbS, HgC, etc)do  not significantly interfere with this assay.   However, presence of multiple variants may affect accuracy.     07/01/2019 7.9 (H) 4.0 - 5.6 % Final     Comment:     ADA Screening Guidelines:  5.7-6.4%  Consistent with prediabetes  >or=6.5%  Consistent with diabetes  High levels of fetal hemoglobin interfere with the HbA1C  assay. Heterozygous hemoglobin variants (HbS, HgC, etc)do  not significantly interfere with this assay.   However, presence of multiple variants may affect accuracy.     07/01/2019 7.9 (H) 4.0 - 5.6 % Final "     Comment:     ADA Screening Guidelines:  5.7-6.4%  Consistent with prediabetes  >or=6.5%  Consistent with diabetes  High levels of fetal hemoglobin interfere with the HbA1C  assay. Heterozygous hemoglobin variants (HbS, HgC, etc)do  not significantly interfere with this assay.   However, presence of multiple variants may affect accuracy.         Chemistry        Component Value Date/Time     02/25/2019 0903    K 4.1 02/25/2019 0903     02/25/2019 0903    CO2 29 02/25/2019 0903    BUN 14 02/25/2019 0903    CREATININE 0.8 02/25/2019 0903     (H) 02/25/2019 0903        Component Value Date/Time    CALCIUM 9.8 02/25/2019 0903    ALKPHOS 72 02/25/2019 0903    AST 22 02/25/2019 0903    ALT 23 02/25/2019 0903    BILITOT 1.2 (H) 02/25/2019 0903        Lab Results   Component Value Date    CHOL 142 02/25/2019    CHOL 213 (H) 01/04/2019    CHOL 142 05/15/2017     Lab Results   Component Value Date    HDL 52 02/25/2019    HDL 52 01/04/2019    HDL 49 05/15/2017     Lab Results   Component Value Date    LDLCALC 56.6 (L) 02/25/2019    LDLCALC Invalid, Trig>400.0 01/04/2019    LDLCALC 64.2 05/15/2017     Lab Results   Component Value Date    TRIG 167 (H) 02/25/2019    TRIG 506 (H) 01/04/2019    TRIG 144 05/15/2017     Lab Results   Component Value Date    TSH 2.887 07/01/2019     Lab Results   Component Value Date    MICALBCREAT 32.6 (H) 07/01/2019     PHYSICAL EXAMINATION  Constitutional: Appears fair, returned to baseline status during visit- initially sl sluggish speech, verbal responses, sl muffled speech- spoke with urgent care, Dr. Somers- advisement ER, advised pt and - they both declined at the time- recs: if symptoms- time is a big factor- go to ER, notify PCP   Neck: Supple, trachea midline.   Respiratory:  even and unlabored.  Lymph: no edema.   Skin: warm and dry; no insulin site reactions observed.  Neuro: patient alert and cooperative.  Feet: appropriate footwear      Assessment and  Plan    1. Type 2 diabetes mellitus with diabetic polyneuropathy, with long-term current use of insulin  Comprehensive metabolic panel    Lipid panel    Hemoglobin A1c    Ambulatory Referral to Diabetes Education    Ambulatory Referral to Diabetes Education   2. Hypothyroidism, acquired     3. Hypertension associated with diabetes     4. Diabetic peripheral neuropathy associated with type 2 diabetes mellitus     5. Altered mental status, unspecified altered mental status type  Ambulatory consult to Neurology         1. F/u in 4 mos   DE in 2 week w/ vgo start  vgo 20 4 clicks with meals, 1 click with snack  Additional click if sugar is >180, 2 clicks if >230 etc.   Continue trulicity and metformin   Until training, continue regimen above  vgo form filled out   YUMIKO Harding will schedule  a1c goal less than 7.5%  2.   Lab Results   Component Value Date    TSH 2.887 07/01/2019   at goal   3. Controlled, continue med(s)  4. Optimize bg will help with condition  5. F/u with neurology           Follow up in about 3 months (around 4/16/2020).   Labs at [facility] lab prior to appt

## 2020-01-27 ENCOUNTER — PATIENT MESSAGE (OUTPATIENT)
Dept: INTERNAL MEDICINE | Facility: CLINIC | Age: 70
End: 2020-01-27

## 2020-01-29 DIAGNOSIS — Z79.4 TYPE 2 DIABETES MELLITUS WITH DIABETIC POLYNEUROPATHY, WITH LONG-TERM CURRENT USE OF INSULIN: ICD-10-CM

## 2020-01-29 DIAGNOSIS — E11.42 TYPE 2 DIABETES MELLITUS WITH DIABETIC POLYNEUROPATHY, WITH LONG-TERM CURRENT USE OF INSULIN: ICD-10-CM

## 2020-01-29 RX ORDER — DULAGLUTIDE 1.5 MG/.5ML
INJECTION, SOLUTION SUBCUTANEOUS
Qty: 6 ML | Refills: 3 | Status: SHIPPED | OUTPATIENT
Start: 2020-01-29 | End: 2020-12-14

## 2020-02-10 ENCOUNTER — PATIENT OUTREACH (OUTPATIENT)
Dept: ADMINISTRATIVE | Facility: OTHER | Age: 70
End: 2020-02-10

## 2020-02-10 NOTE — PROGRESS NOTES
Care Everywhere: updated  Immunization: updated  Health Maintenance: updated  Media Review: reviewed for possible outside colon cancer report  Legacy Review: n/a  Order placed: n/a  Upcoming appts:n/a

## 2020-02-24 RX ORDER — INSULIN ASPART 100 [IU]/ML
INJECTION, SOLUTION INTRAVENOUS; SUBCUTANEOUS
Qty: 45 ML | Refills: 3 | Status: SHIPPED | OUTPATIENT
Start: 2020-02-24 | End: 2020-03-13 | Stop reason: SDUPTHER

## 2020-03-07 DIAGNOSIS — E11.65 UNCONTROLLED TYPE 2 DIABETES MELLITUS WITH HYPERGLYCEMIA: Primary | ICD-10-CM

## 2020-03-11 DIAGNOSIS — E11.42 DIABETIC PERIPHERAL NEUROPATHY ASSOCIATED WITH TYPE 2 DIABETES MELLITUS: Chronic | ICD-10-CM

## 2020-03-11 RX ORDER — INSULIN GLARGINE 100 [IU]/ML
INJECTION, SOLUTION SUBCUTANEOUS
Qty: 30 ML | Refills: 3 | Status: SHIPPED | OUTPATIENT
Start: 2020-03-11 | End: 2020-03-13 | Stop reason: SDUPTHER

## 2020-03-12 NOTE — TELEPHONE ENCOUNTER
----- Message from LOVE Dailey, JEANNETTE sent at 3/12/2020 11:17 AM CDT -----  [facility] is out of vgo [facility] at this time  Back order  [facility] pt [facility]  IB

## 2020-03-13 DIAGNOSIS — E11.42 DIABETIC PERIPHERAL NEUROPATHY ASSOCIATED WITH TYPE 2 DIABETES MELLITUS: Chronic | ICD-10-CM

## 2020-03-13 RX ORDER — INSULIN GLARGINE 100 [IU]/ML
INJECTION, SOLUTION SUBCUTANEOUS
Qty: 15 ML | Refills: 3 | Status: SHIPPED | OUTPATIENT
Start: 2020-03-13 | End: 2020-12-02

## 2020-03-13 RX ORDER — INSULIN ASPART 100 [IU]/ML
INJECTION, SOLUTION INTRAVENOUS; SUBCUTANEOUS
Qty: 15 ML | Refills: 3 | Status: SHIPPED | OUTPATIENT
Start: 2020-03-13 | End: 2020-12-02

## 2020-03-13 RX ORDER — PEN NEEDLE, DIABETIC 30 GX3/16"
NEEDLE, DISPOSABLE MISCELLANEOUS
Qty: 400 EACH | Refills: 3 | Status: SHIPPED | OUTPATIENT
Start: 2020-03-13 | End: 2020-12-02

## 2020-03-30 RX ORDER — LEVOTHYROXINE SODIUM 25 UG/1
TABLET ORAL
Qty: 85 TABLET | Refills: 3 | Status: SHIPPED | OUTPATIENT
Start: 2020-03-30 | End: 2021-03-10

## 2020-03-30 RX ORDER — METFORMIN HYDROCHLORIDE 1000 MG/1
TABLET ORAL
Qty: 180 TABLET | Refills: 3 | Status: SHIPPED | OUTPATIENT
Start: 2020-03-30 | End: 2021-03-13 | Stop reason: SDUPTHER

## 2020-04-19 DIAGNOSIS — E55.9 VITAMIN D DEFICIENCY DISEASE: ICD-10-CM

## 2020-04-19 DIAGNOSIS — E03.8 SUBCLINICAL HYPOTHYROIDISM: ICD-10-CM

## 2020-04-19 RX ORDER — LOSARTAN POTASSIUM AND HYDROCHLOROTHIAZIDE 12.5; 5 MG/1; MG/1
TABLET ORAL
Qty: 90 TABLET | Refills: 3 | Status: SHIPPED | OUTPATIENT
Start: 2020-04-19 | End: 2021-04-02 | Stop reason: SDUPTHER

## 2020-04-20 RX ORDER — ERGOCALCIFEROL 1.25 MG/1
CAPSULE ORAL
Qty: 3 CAPSULE | Refills: 2 | Status: SHIPPED | OUTPATIENT
Start: 2020-04-20 | End: 2020-11-13

## 2020-05-08 DIAGNOSIS — E11.42 DIABETIC PERIPHERAL NEUROPATHY ASSOCIATED WITH TYPE 2 DIABETES MELLITUS: ICD-10-CM

## 2020-05-08 RX ORDER — ATORVASTATIN CALCIUM 20 MG/1
TABLET, FILM COATED ORAL
Qty: 90 TABLET | Refills: 3 | Status: SHIPPED | OUTPATIENT
Start: 2020-05-08 | End: 2021-04-21 | Stop reason: SDUPTHER

## 2020-05-12 ENCOUNTER — LAB VISIT (OUTPATIENT)
Dept: LAB | Facility: HOSPITAL | Age: 70
End: 2020-05-12
Attending: FAMILY MEDICINE
Payer: MEDICARE

## 2020-05-12 DIAGNOSIS — E11.42 TYPE 2 DIABETES MELLITUS WITH DIABETIC POLYNEUROPATHY, WITH LONG-TERM CURRENT USE OF INSULIN: Chronic | ICD-10-CM

## 2020-05-12 DIAGNOSIS — Z79.4 TYPE 2 DIABETES MELLITUS WITH DIABETIC POLYNEUROPATHY, WITH LONG-TERM CURRENT USE OF INSULIN: Chronic | ICD-10-CM

## 2020-05-12 LAB
ALBUMIN SERPL BCP-MCNC: 3.8 G/DL (ref 3.5–5.2)
ALP SERPL-CCNC: 78 U/L (ref 55–135)
ALT SERPL W/O P-5'-P-CCNC: 30 U/L (ref 10–44)
ANION GAP SERPL CALC-SCNC: 9 MMOL/L (ref 8–16)
AST SERPL-CCNC: 31 U/L (ref 10–40)
BILIRUB SERPL-MCNC: 1.2 MG/DL (ref 0.1–1)
BUN SERPL-MCNC: 15 MG/DL (ref 8–23)
CALCIUM SERPL-MCNC: 9.5 MG/DL (ref 8.7–10.5)
CHLORIDE SERPL-SCNC: 102 MMOL/L (ref 95–110)
CHOLEST SERPL-MCNC: 156 MG/DL (ref 120–199)
CHOLEST/HDLC SERPL: 3.2 {RATIO} (ref 2–5)
CO2 SERPL-SCNC: 28 MMOL/L (ref 23–29)
CREAT SERPL-MCNC: 0.8 MG/DL (ref 0.5–1.4)
EST. GFR  (AFRICAN AMERICAN): >60 ML/MIN/1.73 M^2
EST. GFR  (NON AFRICAN AMERICAN): >60 ML/MIN/1.73 M^2
ESTIMATED AVG GLUCOSE: 189 MG/DL (ref 68–131)
GLUCOSE SERPL-MCNC: 193 MG/DL (ref 70–110)
HBA1C MFR BLD HPLC: 8.2 % (ref 4–5.6)
HDLC SERPL-MCNC: 49 MG/DL (ref 40–75)
HDLC SERPL: 31.4 % (ref 20–50)
LDLC SERPL CALC-MCNC: 76.4 MG/DL (ref 63–159)
NONHDLC SERPL-MCNC: 107 MG/DL
POTASSIUM SERPL-SCNC: 4.8 MMOL/L (ref 3.5–5.1)
PROT SERPL-MCNC: 7.1 G/DL (ref 6–8.4)
SODIUM SERPL-SCNC: 139 MMOL/L (ref 136–145)
TRIGL SERPL-MCNC: 153 MG/DL (ref 30–150)

## 2020-05-12 PROCEDURE — 83036 HEMOGLOBIN GLYCOSYLATED A1C: CPT

## 2020-05-12 PROCEDURE — 80053 COMPREHEN METABOLIC PANEL: CPT

## 2020-05-12 PROCEDURE — 36415 COLL VENOUS BLD VENIPUNCTURE: CPT

## 2020-05-12 PROCEDURE — 80061 LIPID PANEL: CPT

## 2020-05-20 ENCOUNTER — PATIENT OUTREACH (OUTPATIENT)
Dept: ADMINISTRATIVE | Facility: OTHER | Age: 70
End: 2020-05-20

## 2020-05-20 ENCOUNTER — OFFICE VISIT (OUTPATIENT)
Dept: INTERNAL MEDICINE | Facility: CLINIC | Age: 70
End: 2020-05-20
Payer: MEDICARE

## 2020-05-20 ENCOUNTER — PATIENT MESSAGE (OUTPATIENT)
Dept: INTERNAL MEDICINE | Facility: CLINIC | Age: 70
End: 2020-05-20

## 2020-05-20 VITALS
HEART RATE: 78 BPM | BODY MASS INDEX: 28 KG/M2 | WEIGHT: 164 LBS | HEIGHT: 64 IN | SYSTOLIC BLOOD PRESSURE: 122 MMHG | OXYGEN SATURATION: 97 % | DIASTOLIC BLOOD PRESSURE: 64 MMHG

## 2020-05-20 DIAGNOSIS — E11.42 TYPE 2 DIABETES MELLITUS WITH DIABETIC POLYNEUROPATHY, WITH LONG-TERM CURRENT USE OF INSULIN: Chronic | ICD-10-CM

## 2020-05-20 DIAGNOSIS — E11.59 HYPERTENSION ASSOCIATED WITH DIABETES: Chronic | ICD-10-CM

## 2020-05-20 DIAGNOSIS — E78.5 HYPERLIPIDEMIA ASSOCIATED WITH TYPE 2 DIABETES MELLITUS: Chronic | ICD-10-CM

## 2020-05-20 DIAGNOSIS — E55.9 VITAMIN D DEFICIENCY DISEASE: ICD-10-CM

## 2020-05-20 DIAGNOSIS — E11.69 HYPERLIPIDEMIA ASSOCIATED WITH TYPE 2 DIABETES MELLITUS: Chronic | ICD-10-CM

## 2020-05-20 DIAGNOSIS — Z79.4 TYPE 2 DIABETES MELLITUS WITH DIABETIC POLYNEUROPATHY, WITH LONG-TERM CURRENT USE OF INSULIN: Chronic | ICD-10-CM

## 2020-05-20 DIAGNOSIS — E03.9 HYPOTHYROIDISM, ACQUIRED: Chronic | ICD-10-CM

## 2020-05-20 DIAGNOSIS — E11.42 DIABETIC PERIPHERAL NEUROPATHY ASSOCIATED WITH TYPE 2 DIABETES MELLITUS: Primary | Chronic | ICD-10-CM

## 2020-05-20 DIAGNOSIS — I15.2 HYPERTENSION ASSOCIATED WITH DIABETES: Chronic | ICD-10-CM

## 2020-05-20 PROCEDURE — 99999 PR PBB SHADOW E&M-EST. PATIENT-LVL IV: CPT | Mod: PBBFAC,,, | Performed by: NURSE PRACTITIONER

## 2020-05-20 PROCEDURE — 99999 PR PBB SHADOW E&M-EST. PATIENT-LVL IV: ICD-10-PCS | Mod: PBBFAC,,, | Performed by: NURSE PRACTITIONER

## 2020-05-20 PROCEDURE — 99214 PR OFFICE/OUTPT VISIT, EST, LEVL IV, 30-39 MIN: ICD-10-PCS | Mod: S$PBB,,, | Performed by: NURSE PRACTITIONER

## 2020-05-20 PROCEDURE — 99214 OFFICE O/P EST MOD 30 MIN: CPT | Mod: PBBFAC | Performed by: NURSE PRACTITIONER

## 2020-05-20 PROCEDURE — 99214 OFFICE O/P EST MOD 30 MIN: CPT | Mod: S$PBB,,, | Performed by: NURSE PRACTITIONER

## 2020-05-20 NOTE — PATIENT INSTRUCTIONS
Snacks can be an important part of a balanced, healthy meal plan. They allow you to eat more frequently, feeling full and satisfied throughout the day. Also, they allow you to spread carbohydrates evenly, which may stabilize blood sugars.  Plus, snacks are enjoyable!     The amount of carbohydrate needed at snacks varies. Generally, about 15-30 grams of carbohydrate per snack is recommended.  Below you will find some tasty treats.       0-5 gm carb   Crystal Light   Vitamin Water Zero   Herbal tea, unsweetened   2 tsp peanut butter on celery   1./2 cup sugar-free jell-o   1 sugar-free popsicle   ¼ cup blueberries   8oz Blue Rochelle unsweetened almond milk   5 baby carrots & celery sticks, cucumbers, bell peppers dipped in ¼ cup salsa, 2Tbsp light ranch dressing or 2Tbsp plain Greek yogurt   10 Goldfish crackers   ½ oz low-fat cheese or string cheese   1 closed handful of nuts, unsalted   1 Tbsp of sunflower seeds, unsalted   1 cup Smart Pop popcorn   1 whole grain brown rice cake        15 gm carb   1 small piece of fruit or ½ banana or 1/2 cup lite canned fruit   3 drea cracker squares   3 cups Smart Pop popcorn, top spray butter, Kelley lite salt or cinnamon and Truvia   5 Vanilla Wafers   ½ cup low fat, no added sugar ice cream or frozen yogurt (Blue bell, Blue Bunny, Weight Watchers, Skinny Cow)   ½ turkey, ham, or chicken sandwich   ½ c fruit with ½ c Cottage cheese   4-6 unsalted wheat crackers with 1 oz low fat cheese or 1 tbsp peanut butter    30-45 goldfish crackers (depending on flavor)    7-8 Advent mini brown rice cakes (caramel, apple cinnamon, chocolate)    12 Advent mini brown rice cakes (cheddar, bbq, ranch)    1/3 cup hummus dip with raw veg   1/2 whole wheat reece, 1Tbsp hummus   Mini Pizza (1/2 whole wheat English muffin, low-fat  cheese, tomato sauce)   100 calorie snack pack (Oreo, Chips Ahoy, Ritz Mix, Baked Cheetos)   4-6 oz. light or Greek Style yogurt  (Bonilla, Bryson, [facility])   ½ cup sugar-free pudding     6 in. wheat tortilla or reece oven toasted chips (topped with spray butter flavoring, cinnamon, Truvia OR spray butter, garlic powder, chili powder)    18 BBQ Popchips (available at Target, Whole Foods, Fresh Market)

## 2020-05-20 NOTE — PROGRESS NOTES
CC: Patient is here for management of Type 2 diabetes and review of medical conditions as listed in visit diagnosis. She is accompanied by her .      HPI: Ms. Linda Fong is a 70 y.o. female who was diagnosed with Type 2 in 1993, on employment physical exam with labs. She has never been hospitalized r/t DM. She suffered a ICH after a fall years ago and has had decreased memory since then.  Previously tried Novolog AC.  Pt has h/o hypothyroidism, HTN, PN, AMS, TIA like symptoms, ocular HAs,     Sensitive to long gaps of not eating-eggs this am, lunch around 2p.     ER visit 1/2019- AMS, work up for stroke, NSTEMI- both negative, MRI of  Brain, EEG done.      Denies personal history of pancreatitis or pancreatic cancer. Denies family hx of thyroid cancer.     Seen by FARZANEH Tomas DNP, MANAV Hutton NP.   Last seen by me in 1/2020 and is now being seen by me for the second time.  a1c went up from last visit -- 8% to 8.2%.    Now has vgo, insulin vials  Needs DE appt     Lab Results   Component Value Date    HGBA1C 8.2 (H) 05/12/2020     CURRENT DIABETIC MEDS: Lantus 38 units nightly, novolog 12 units ac, Metformin 1,000 mg bid, Trulicity 1.5 mg weekly    Uses Vials or Pens: Pens  Rarely missing doses of diabetes medications.     Type of Glucose Meter: Accu-Chek Compact      On MDI injections 4 x a day  Testing 4 x a day max   BG 150s-240s   Highest 280 mg/dl  Lowest 60 mg/ld recently   Patient is willing and able to use the device  Demonstrated an understanding of the technology and is motivated to use CGM  Patient expected to adhere to a comprehensive diabetes treatment plan and patient has adequate medical supervision  Patient experiences  impaired awareness of hypoglycemia (hypoglycemia unawareness) at times  D/t pandemic (covid19) it is imperative for pt to have BG levels monitored closely for optimal health         Diet: 4 mini meals daily with snackings, BK oatmeal; RANJANA open faced sandwich/ hamburger; DIN  "biggest meal home cooked meal - limits CHO, eats more meat/ vegetables; + snacking on nuts/ fruit/ sometimes chips; eats mostly at home, drinks coffee, water  W/ pandemic- increased eating, snacking more     Not currently consistently exercising, but has membership to [employer]    Diabetes Management Status    Statin: Taking  ACE/ARB: Taking    Screening or Prevention Patient's value Goal Complete/Controlled?   HgA1C Testing and Control   Lab Results   Component Value Date    HGBA1C 8.2 (H) 05/12/2020      Annually/Less than 8% Yes   Lipid profile : 05/12/2020 Annually Yes   LDL control Lab Results   Component Value Date    LDLCALC 76.4 05/12/2020    Annually/Less than 100 mg/dl  Yes   Nephropathy screening Lab Results   Component Value Date    LABMICR 14.0 07/01/2019     Lab Results   Component Value Date    PROTEINUA Negative 01/05/2019    Annually Yes   Blood pressure BP Readings from Last 1 Encounters:   05/20/20 122/64    Less than 140/90 Yes   Dilated retinal exam : 11/09/2018 Annually Yes   Foot exam   : 03/04/2019 Annually Yes     REVIEW OF SYSTEMS  General: no weakness; c/o fatigue   Eyes: no blurry vision or eye pain.    Cardiac: no chest pain or palpitations.   Respiratory: no cough or dyspnea.   GI: no abdominal pain or nausea.   Skin: no rashes, wounds, or insulin injection site reactions.   Neuro: no numbness or tingling; no dizziness,   Endocrine: no polyuria, polydipsia, polyphagia.   Psych: sleeping well if does not nap during day, no anxiety or depression.   MS: chronic right hip, lower back pain at times.     Vital Signs  /64   Pulse 78   Ht 5' 4" (1.626 m)   Wt 74.4 kg (164 lb)   SpO2 97%   BMI 28.15 kg/m²     Hemoglobin A1C   Date Value Ref Range Status   05/12/2020 8.2 (H) 4.0 - 5.6 % Final     Comment:     ADA Screening Guidelines:  5.7-6.4%  Consistent with prediabetes  >or=6.5%  Consistent with diabetes  High levels of fetal hemoglobin interfere with the HbA1C  assay. " Heterozygous hemoglobin variants (HbS, HgC, etc)do  not significantly interfere with this assay.   However, presence of multiple variants may affect accuracy.     01/09/2020 8.0 (H) 4.0 - 5.6 % Final     Comment:     ADA Screening Guidelines:  5.7-6.4%  Consistent with prediabetes  >or=6.5%  Consistent with diabetes  High levels of fetal hemoglobin interfere with the HbA1C  assay. Heterozygous hemoglobin variants (HbS, HgC, etc)do  not significantly interfere with this assay.   However, presence of multiple variants may affect accuracy.     07/01/2019 7.9 (H) 4.0 - 5.6 % Final     Comment:     ADA Screening Guidelines:  5.7-6.4%  Consistent with prediabetes  >or=6.5%  Consistent with diabetes  High levels of fetal hemoglobin interfere with the HbA1C  assay. Heterozygous hemoglobin variants (HbS, HgC, etc)do  not significantly interfere with this assay.   However, presence of multiple variants may affect accuracy.     07/01/2019 7.9 (H) 4.0 - 5.6 % Final     Comment:     ADA Screening Guidelines:  5.7-6.4%  Consistent with prediabetes  >or=6.5%  Consistent with diabetes  High levels of fetal hemoglobin interfere with the HbA1C  assay. Heterozygous hemoglobin variants (HbS, HgC, etc)do  not significantly interfere with this assay.   However, presence of multiple variants may affect accuracy.         Chemistry        Component Value Date/Time     05/12/2020 0904    K 4.8 05/12/2020 0904     05/12/2020 0904    CO2 28 05/12/2020 0904    BUN 15 05/12/2020 0904    CREATININE 0.8 05/12/2020 0904     (H) 05/12/2020 0904        Component Value Date/Time    CALCIUM 9.5 05/12/2020 0904    ALKPHOS 78 05/12/2020 0904    AST 31 05/12/2020 0904    ALT 30 05/12/2020 0904    BILITOT 1.2 (H) 05/12/2020 0904        Lab Results   Component Value Date    CHOL 156 05/12/2020    CHOL 142 02/25/2019    CHOL 213 (H) 01/04/2019     Lab Results   Component Value Date    HDL 49 05/12/2020    HDL 52 02/25/2019    HDL 52  01/04/2019     Lab Results   Component Value Date    LDLCALC 76.4 05/12/2020    LDLCALC 56.6 (L) 02/25/2019    LDLCALC Invalid, Trig>400.0 01/04/2019     Lab Results   Component Value Date    TRIG 153 (H) 05/12/2020    TRIG 167 (H) 02/25/2019    TRIG 506 (H) 01/04/2019     Lab Results   Component Value Date    TSH 2.887 07/01/2019     Lab Results   Component Value Date    MICALBCREAT 32.6 (H) 07/01/2019     PHYSICAL EXAMINATION  Constitutional: Appears well, alert, oriented.  Neck: Supple, trachea midline.   Respiratory:  even and unlabored.  Lymph: no edema.   Skin: warm and dry; no insulin site reactions observed.  Neuro: patient alert and cooperative.  Feet: appropriate footwear    Protective Sensation (w/ 10 gram monofilament):  Right: Intact 5/5  Left: Intact 5/5     Visual Inspection:  Normal -  Bilateral and Onychomycosis -  Bilateral  Small callus on great toes    Pedal Pulses:   Right: Present  Left: Present    Posterior tibialis:   Right:Present  Left: Present      Assessment and Plan    1. Diabetic peripheral neuropathy associated with type 2 diabetes mellitus  Hemoglobin A1C    Microalbumin/creatinine urine ratio    Ambulatory referral/consult to Diabetes Education   2. Type 2 diabetes mellitus with diabetic polyneuropathy, with long-term current use of insulin  Ambulatory referral/consult to Diabetes Education   3. Hypothyroidism, acquired  TSH   4. Hypertension associated with diabetes  Microalbumin/creatinine urine ratio   5. Hyperlipidemia associated with type 2 diabetes mellitus     6. Vitamin D deficiency disease           1. F/u in 4 mos   DE in 1-2 week w/ vgo start  vgo 20 4 clicks with meals, 1 click with snack  Additional click if sugar is >180, 2 clicks if >230 etc.   Continue trulicity and metformin   Until training, continue regimen above  Has product and vials.  a1c goal less than 7.5%  2. Optimize bg will help with condition  3.   Lab Results   Component Value Date    TSH 2.887 07/01/2019      On lt4 25 mcg   at goal   4. Controlled, med(s)  5.   Lab Results   Component Value Date    LDLCALC 76.4 05/12/2020     At goal   6. On supplement       Follow up in about 4 months (around 9/20/2020).   [place]abs at [facility] [place] [facility] prior to appt

## 2020-05-21 ENCOUNTER — CLINICAL SUPPORT (OUTPATIENT)
Dept: DIABETES | Facility: CLINIC | Age: 70
End: 2020-05-21
Payer: MEDICARE

## 2020-05-21 DIAGNOSIS — Z79.4 TYPE 2 DIABETES MELLITUS WITH DIABETIC POLYNEUROPATHY, WITH LONG-TERM CURRENT USE OF INSULIN: Chronic | ICD-10-CM

## 2020-05-21 DIAGNOSIS — E11.42 TYPE 2 DIABETES MELLITUS WITH DIABETIC POLYNEUROPATHY, WITH LONG-TERM CURRENT USE OF INSULIN: Chronic | ICD-10-CM

## 2020-05-21 DIAGNOSIS — E11.42 DIABETIC PERIPHERAL NEUROPATHY ASSOCIATED WITH TYPE 2 DIABETES MELLITUS: Chronic | ICD-10-CM

## 2020-05-21 PROCEDURE — 99999 PR PBB SHADOW E&M-EST. PATIENT-LVL III: ICD-10-PCS | Mod: PBBFAC,,, | Performed by: DIETITIAN, REGISTERED

## 2020-05-21 PROCEDURE — 99999 PR PBB SHADOW E&M-EST. PATIENT-LVL III: CPT | Mod: PBBFAC,,, | Performed by: DIETITIAN, REGISTERED

## 2020-05-21 PROCEDURE — 99213 OFFICE O/P EST LOW 20 MIN: CPT | Mod: PBBFAC,PO | Performed by: DIETITIAN, REGISTERED

## 2020-05-21 PROCEDURE — G0108 DIAB MANAGE TRN  PER INDIV: HCPCS | Mod: PBBFAC,PO | Performed by: DIETITIAN, REGISTERED

## 2020-05-21 NOTE — PROGRESS NOTES
Diabetes Education  Author: Ayanna Ferrari RD, CDE  Date: 5/21/2020    Diabetes Care Management Summary  Diabetes Education Record Assessment/Progress: Initial(VGO Start)     Diabetes Type  Diabetes Type : Type II    Diabetes History  Diabetes Diagnosis: >10 years  Current Treatment: Insulin, Injectable(VGO start today)    Health Maintenance was reviewed today with patient. Discussed with patient importance of routine eye exams, foot exams/foot care, blood work (i.e.: A1c, microalbumin, and lipid), dental visits, yearly flu vaccine, and pneumonia vaccine as indicated by PCP. Patient verbalized understanding.     Health Maintenance Topics with due status: Not Due       Topic Last Completion Date    TETANUS VACCINE 01/06/2017    Mammogram 01/31/2019    Colonoscopy 02/19/2019    Lipid Panel 05/12/2020    Hemoglobin A1c 05/12/2020    Aspirin/Antiplatelet Therapy 05/20/2020    Foot Exam 05/20/2020     Health Maintenance Due   Topic Date Due    Eye Exam  11/09/2019    DEXA SCAN  02/10/2020       Nutrition  Meal Planning: 3 meals per day, snacks between meal, water, eats out seldom, artificial sweeteners  What type of sweetener do you use?: Stevia/Truvia  What type of beverages do you drink?: diet soda/tea, water, other (see comments), milk(Coffee; milk w/ cereal)    Monitoring   Self Monitoring : (Not checking BG because she feels her readings are always the same; will check if something feels off - does have meter and supplies)  Blood Glucose Logs: No  Do you use a personal continuous glucose monitor?: No  In the last month, how often have you had a low blood sugar reaction?: never  What are your symptoms of low blood sugar?: (N/A)  How do you treat low blood sugar?: (N/A)  Can you tell when your blood sugar is too high?: no  How do you treat high blood sugar?: (None)    Exercise   Exercise Type: none(None since COVID-19)    Current Diabetes Treatment   Current Treatment: Insulin, Injectable(VGO start today)    Social  History  Preferred Learning Method: Face to Face  Primary Support: Self, Spouse  Smoking Status: Never a Smoker  Alcohol Use: Never    Barriers to Change  Barriers to Change: None  Learning Challenges : None    Readiness to Learn   Readiness to Learn : Acceptance    Cultural Influences  Cultural Influences: No    Diabetes Education Assessment/Progress  Medications (states correct name, dose, onset, peak, duration, side effects & timing of meds): Instructed, Discussion, Demonstration, Return Demonstration, Individual Session, Written Materials Provided, Demonstrates Understanding/Competency(verbalizes/demonstrates)     Patient is here for instructions on use of the V-GO 20 which will provide 20 units of basal insulin given over 24 hours (0.83 units/hr) and up to 36 units of on-demand bolus dosing in 2-Unit increments. Patient will be giving 8 units of Novolog at each meal (4 clicks). She will give 1 click for snacks; 1 click for BG over 180 and 2 clicks for BG over 230. Patient had been instructed to hold Lantus. Patient also advised that she will discontinue taking the flexpens and that she will only use the Novolog vial with the V-Go system.     Patient was instructed on the following:    Insert vial into EZ Fill and leave in place until vial is empty   Remove plug and insert V-Go    Draw insulin into EZ Fill and fill V-Go with insulin (check that V-Go is full of insulin)    Remove V-GO and clean and replace plug (advised to wipe the circular opening with an alcohol swab before replacing)    Select site for V-Go (site selection reviewed) and prepare infusion site with aseptic technique    Remove button cover and adhesive liner    Attach V-Go to site selected and insert needle by pushing needle button in to activate basal rate    Instructed patient on how to activate the bolus ready button and to push the bolus delivery button into the V-Go to deliver 2 units of insulin (1 push = 2 units). Patient advised  that once all 36 units of the on-demand bolus have been given, the bolus buttons will lock, but the basal insulin will continue for the remainder of the 24 hours    To remove, release needle by sliding and pressing the needle release button    Remove the V-Go and discard (the V-Go is 100% disposable)   Patient instructed that the V-Go needs to be changed every 24 hours.    Patient was able to perform successful return demonstration of all.     General precautions reviewed with the patient:    V-Go needs to be removed before having an MRI. Replace with a new V-Go after the test or procedure is completed. Patient also advised to check with her doctor before any type of surgical procedure to see if the V-Go can be worn.    Patient advised to monitor her BG 4 times a day (pre-meals and at HS)    Patient advised to keep back up pens (non-) should a problem develop with the V-Go. Patient also advised that she should have directions from her MD regarding insulin doses should he/she need to switch back to pens temporarily.    The V-Go should not be exposed to direct sunlight, hot tub, whirlpool or sauna use (replace with a new filled V-Go afterward)    Check that the V-Go is securely in place during and after periods of increased physical activity, exposure to water or gone under water to the depth of 3 feet, 3 inches (the V-Go can go under water and continue to work safely)    Reviewed s/s and tx of hypoglycemia    Goals  Patient has selected/evaluated goals during today's session: No    Diabetes Care Plan/Intervention  Education Plan/Intervention: Individual Follow-Up DSMT    Today's Self-Management Care Plan was developed with the patient's input and is based on barriers identified during today's assessment.    The long and short-term goals in the care plan were written with the patient/caregiver's input. The patient has agreed to work toward these goals to improve her overall diabetes control.      The  patient received a copy of today's self-management plan and verbalized understanding of the care plan, goals, and all of today's instructions.      The patient was encouraged to communicate with her physician and care team regarding her condition(s) and treatment.  I provided the patient with my contact information today and encouraged her to contact me via phone or patient portal as needed.     Education Units of Time   Time Spent: 60 min

## 2020-06-09 ENCOUNTER — PATIENT OUTREACH (OUTPATIENT)
Dept: ADMINISTRATIVE | Facility: OTHER | Age: 70
End: 2020-06-09

## 2020-06-09 NOTE — PROGRESS NOTES
Care Everywhere: updated  Immunization: updated  Health Maintenance: updated  Media Review: reviewed for outside eye exam report  Legacy Review: n/a  Order placed: n/a  Upcoming appts:n/a

## 2020-06-10 ENCOUNTER — OFFICE VISIT (OUTPATIENT)
Dept: DERMATOLOGY | Facility: CLINIC | Age: 70
End: 2020-06-10
Payer: MEDICARE

## 2020-06-10 DIAGNOSIS — D22.9 NEVUS: ICD-10-CM

## 2020-06-10 DIAGNOSIS — L81.4 LENTIGO: ICD-10-CM

## 2020-06-10 DIAGNOSIS — L84 CORN OF FOOT: ICD-10-CM

## 2020-06-10 DIAGNOSIS — L57.0 AK (ACTINIC KERATOSIS): Primary | ICD-10-CM

## 2020-06-10 DIAGNOSIS — D18.01 CHERRY ANGIOMA: ICD-10-CM

## 2020-06-10 DIAGNOSIS — L73.8 SEBACEOUS GLAND HYPERPLASIA: ICD-10-CM

## 2020-06-10 DIAGNOSIS — L82.1 SK (SEBORRHEIC KERATOSIS): ICD-10-CM

## 2020-06-10 PROCEDURE — 17000 PR DESTRUCTION(LASER SURGERY,CRYOSURGERY,CHEMOSURGERY),PREMALIGNANT LESIONS,FIRST LESION: ICD-10-PCS | Mod: S$PBB,,, | Performed by: DERMATOLOGY

## 2020-06-10 PROCEDURE — 17003 DESTRUCTION, PREMALIGNANT LESIONS; SECOND THROUGH 14 LESIONS: ICD-10-PCS | Mod: S$PBB,,, | Performed by: DERMATOLOGY

## 2020-06-10 PROCEDURE — 99999 PR PBB SHADOW E&M-EST. PATIENT-LVL II: ICD-10-PCS | Mod: PBBFAC,,, | Performed by: DERMATOLOGY

## 2020-06-10 PROCEDURE — 99999 PR PBB SHADOW E&M-EST. PATIENT-LVL II: CPT | Mod: PBBFAC,,, | Performed by: DERMATOLOGY

## 2020-06-10 PROCEDURE — 17003 DESTRUCT PREMALG LES 2-14: CPT | Mod: S$PBB,,, | Performed by: DERMATOLOGY

## 2020-06-10 PROCEDURE — 17000 DESTRUCT PREMALG LESION: CPT | Mod: S$PBB,,, | Performed by: DERMATOLOGY

## 2020-06-10 PROCEDURE — 17000 DESTRUCT PREMALG LESION: CPT | Mod: PBBFAC,PO | Performed by: DERMATOLOGY

## 2020-06-10 PROCEDURE — 99212 OFFICE O/P EST SF 10 MIN: CPT | Mod: PBBFAC,PO,25 | Performed by: DERMATOLOGY

## 2020-06-10 PROCEDURE — 99214 PR OFFICE/OUTPT VISIT, EST, LEVL IV, 30-39 MIN: ICD-10-PCS | Mod: 25,S$PBB,, | Performed by: DERMATOLOGY

## 2020-06-10 PROCEDURE — 17003 DESTRUCT PREMALG LES 2-14: CPT | Mod: PBBFAC,PO | Performed by: DERMATOLOGY

## 2020-06-10 PROCEDURE — 99214 OFFICE O/P EST MOD 30 MIN: CPT | Mod: 25,S$PBB,, | Performed by: DERMATOLOGY

## 2020-06-10 NOTE — PROGRESS NOTES
Subjective:       Patient ID:  Linda Fong is a 70 y.o. female who presents for   Chief Complaint   Patient presents with    Skin Check     Pt here today for a UBSE; she is here for recheck of lesion on nose.  Pt feels lesion still gets scaly every now and then.   Pt  Also has a bump on her right foot under her toe. Present for a few months. Not painful.  No tx.           Review of Systems   Skin: Positive for activity-related sunscreen use. Negative for tendency to form keloidal scars and wears hat.   Hematologic/Lymphatic: Bruises/bleeds easily.        Objective:    Physical Exam   Constitutional: She appears well-developed and well-nourished. No distress.   Neurological: She is alert and oriented to person, place, and time. She is not disoriented.   Psychiatric: She has a normal mood and affect.   Skin:   Areas Examined (abnormalities noted in diagram):   Scalp / Hair Palpated and Inspected  Head / Face Inspection Performed  Neck Inspection Performed  Chest / Axilla Inspection Performed  Abdomen Inspection Performed  Genitals / Buttocks / Groin Inspection Performed  Back Inspection Performed  RUE Inspected  LUE Inspection Performed  RLE Inspected  LLE Inspection Performed  Nails and Digits Inspection Performed                       Diagram Legend     Erythematous scaling macule/papule c/w actinic keratosis       Vascular papule c/w angioma      Pigmented verrucoid papule/plaque c/w seborrheic keratosis      Yellow umbilicated papule c/w sebaceous hyperplasia      Irregularly shaped tan macule c/w lentigo     1-2 mm smooth white papules consistent with Milia      Movable subcutaneous cyst with punctum c/w epidermal inclusion cyst      Subcutaneous movable cyst c/w pilar cyst      Firm pink to brown papule c/w dermatofibroma      Pedunculated fleshy papule(s) c/w skin tag(s)      Evenly pigmented macule c/w junctional nevus     Mildly variegated pigmented, slightly irregular-bordered macule c/w mildly  atypical nevus      Flesh colored to evenly pigmented papule c/w intradermal nevus       Pink pearly papule/plaque c/w basal cell carcinoma      Erythematous hyperkeratotic cursted plaque c/w SCC      Surgical scar with no sign of skin cancer recurrence      Open and closed comedones      Inflammatory papules and pustules      Verrucoid papule consistent consistent with wart     Erythematous eczematous patches and plaques     Dystrophic onycholytic nail with subungual debris c/w onychomycosis     Umbilicated papule    Erythematous-base heme-crusted tan verrucoid plaque consistent with inflamed seborrheic keratosis     Erythematous Silvery Scaling Plaque c/w Psoriasis     See annotation      Assessment / Plan:        AK (actinic keratosis)  Cryosurgery Procedure Note    Verbal consent from the patient is obtained including, but not limited to, risk of hypopigmentation/hyperpigmentation, scar, recurrence of lesion. The patient is aware of the precancerous quality and need for treatment of these lesions. Liquid nitrogen cryosurgery is applied to the 2 actinic keratoses, as detailed in the physical exam, to produce a freeze injury. The patient is aware that blisters may form and is instructed on wound care with gentle cleansing and use of vaseline ointment to keep moist until healed. The patient is supplied a handout on cryosurgery and is instructed to call if lesions do not completely resolve.    Sebaceous gland hyperplasia  This is a common condition representing benign enlargement of the sebaceous lobule. It typically occurs in adulthood. Reassurance given to patient.     Lentigo  This is a benign hyperpigmented sun induced lesion. Daily sun protection will reduce the number of new lesions. Treatment of these benign lesions are considered cosmetic.  The nature of sun-induced photo-aging and skin cancers is discussed.  Sun avoidance, protective clothing, and the use of 30-SPF sunscreens is advised. Observe closely for  skin damage/changes, and call if such occurs.    Cherry angioma  These are benign vascular lesions that are inherited.  Treatment is not necessary.    Nevus  Discussed ABCDE's of nevi.  Monitor for new mole or moles that are becoming bigger, darker, irritated, or developing irregular borders. Brochure provided.    SK (seborrheic keratosis)  These are benign inherited growths without a malignant potential. Reassurance given to patient. No treatment is necessary.     Corn of foot  otc corn remover pads recommended      Total body skin examination performed today including at least 12 points as noted in physical examination. No lesions suspicious for malignancy noted.             Follow up in about 1 year (around 6/10/2021).

## 2020-07-15 DIAGNOSIS — Z71.89 COMPLEX CARE COORDINATION: ICD-10-CM

## 2020-07-21 RX ORDER — SUB-Q INSULIN DEVICE, 40 UNIT
EACH MISCELLANEOUS
Qty: 90 DEVICE | Refills: 3 | Status: SHIPPED | OUTPATIENT
Start: 2020-07-21 | End: 2020-11-13 | Stop reason: SDUPTHER

## 2020-07-21 RX ORDER — INSULIN ASPART 100 [IU]/ML
INJECTION, SOLUTION INTRAVENOUS; SUBCUTANEOUS
Qty: 9 VIAL | Refills: 3 | Status: SHIPPED | OUTPATIENT
Start: 2020-07-21 | End: 2020-11-13 | Stop reason: SDUPTHER

## 2020-07-21 NOTE — TELEPHONE ENCOUNTER
No new care gaps identified.  Powered by QA on Request. Reference number: [id number]. 7/21/2020 4:57:38 PM CDT

## 2020-09-29 ENCOUNTER — PATIENT MESSAGE (OUTPATIENT)
Dept: OTHER | Facility: OTHER | Age: 70
End: 2020-09-29

## 2020-11-13 ENCOUNTER — PATIENT MESSAGE (OUTPATIENT)
Dept: INTERNAL MEDICINE | Facility: CLINIC | Age: 70
End: 2020-11-13

## 2020-11-13 ENCOUNTER — TELEPHONE (OUTPATIENT)
Dept: INTERNAL MEDICINE | Facility: CLINIC | Age: 70
End: 2020-11-13

## 2020-11-13 DIAGNOSIS — E11.42 DIABETIC PERIPHERAL NEUROPATHY ASSOCIATED WITH TYPE 2 DIABETES MELLITUS: Primary | Chronic | ICD-10-CM

## 2020-11-13 RX ORDER — INSULIN ASPART 100 [IU]/ML
INJECTION, SOLUTION INTRAVENOUS; SUBCUTANEOUS
Qty: 9 VIAL | Refills: 3 | Status: SHIPPED | OUTPATIENT
Start: 2020-11-13 | End: 2020-12-02

## 2020-11-13 RX ORDER — SUB-Q INSULIN DEVICE, 40 UNIT
EACH MISCELLANEOUS
Qty: 90 DEVICE | Refills: 3 | Status: SHIPPED | OUTPATIENT
Start: 2020-11-13 | End: 2020-12-08 | Stop reason: SDUPTHER

## 2020-11-13 NOTE — TELEPHONE ENCOUNTER
----- Message from LOVE Dailey, MILAP sent at 11/13/2020  1:33 PM CST -----  Regarding: a1c prior  Please make sure a1c is before appt  Thanks  Hadley Torres placed

## 2020-11-25 ENCOUNTER — LAB VISIT (OUTPATIENT)
Dept: LAB | Facility: HOSPITAL | Age: 70
End: 2020-11-25
Attending: NURSE PRACTITIONER
Payer: MEDICARE

## 2020-11-25 DIAGNOSIS — E03.9 HYPOTHYROIDISM, ACQUIRED: Chronic | ICD-10-CM

## 2020-11-25 DIAGNOSIS — E11.42 DIABETIC PERIPHERAL NEUROPATHY ASSOCIATED WITH TYPE 2 DIABETES MELLITUS: Chronic | ICD-10-CM

## 2020-11-25 LAB
ESTIMATED AVG GLUCOSE: 143 MG/DL (ref 68–131)
HBA1C MFR BLD HPLC: 6.6 % (ref 4–5.6)
TSH SERPL DL<=0.005 MIU/L-ACNC: 3.02 UIU/ML (ref 0.4–4)

## 2020-11-25 PROCEDURE — 84443 ASSAY THYROID STIM HORMONE: CPT

## 2020-11-25 PROCEDURE — 36415 COLL VENOUS BLD VENIPUNCTURE: CPT

## 2020-11-25 PROCEDURE — 83036 HEMOGLOBIN GLYCOSYLATED A1C: CPT

## 2020-12-02 ENCOUNTER — CLINICAL SUPPORT (OUTPATIENT)
Dept: OPTOMETRY | Facility: CLINIC | Age: 70
End: 2020-12-02
Attending: NURSE PRACTITIONER
Payer: MEDICARE

## 2020-12-02 ENCOUNTER — OFFICE VISIT (OUTPATIENT)
Dept: INTERNAL MEDICINE | Facility: CLINIC | Age: 70
End: 2020-12-02
Payer: MEDICARE

## 2020-12-02 VITALS
DIASTOLIC BLOOD PRESSURE: 80 MMHG | BODY MASS INDEX: 30.71 KG/M2 | SYSTOLIC BLOOD PRESSURE: 140 MMHG | HEART RATE: 82 BPM | OXYGEN SATURATION: 96 % | WEIGHT: 179.88 LBS | RESPIRATION RATE: 18 BRPM | HEIGHT: 64 IN | TEMPERATURE: 98 F

## 2020-12-02 DIAGNOSIS — E78.5 HYPERLIPIDEMIA ASSOCIATED WITH TYPE 2 DIABETES MELLITUS: Chronic | ICD-10-CM

## 2020-12-02 DIAGNOSIS — I15.2 HYPERTENSION ASSOCIATED WITH DIABETES: Chronic | ICD-10-CM

## 2020-12-02 DIAGNOSIS — E11.42 DIABETIC PERIPHERAL NEUROPATHY ASSOCIATED WITH TYPE 2 DIABETES MELLITUS: ICD-10-CM

## 2020-12-02 DIAGNOSIS — E03.9 HYPOTHYROIDISM, ACQUIRED: Chronic | ICD-10-CM

## 2020-12-02 DIAGNOSIS — J30.89 ENVIRONMENTAL AND SEASONAL ALLERGIES: ICD-10-CM

## 2020-12-02 DIAGNOSIS — Z79.4 TYPE 2 DIABETES MELLITUS WITH DIABETIC POLYNEUROPATHY, WITH LONG-TERM CURRENT USE OF INSULIN: Chronic | ICD-10-CM

## 2020-12-02 DIAGNOSIS — E11.59 HYPERTENSION ASSOCIATED WITH DIABETES: Chronic | ICD-10-CM

## 2020-12-02 DIAGNOSIS — E11.42 DIABETIC PERIPHERAL NEUROPATHY ASSOCIATED WITH TYPE 2 DIABETES MELLITUS: Primary | Chronic | ICD-10-CM

## 2020-12-02 DIAGNOSIS — E11.42 TYPE 2 DIABETES MELLITUS WITH DIABETIC POLYNEUROPATHY, WITH LONG-TERM CURRENT USE OF INSULIN: Chronic | ICD-10-CM

## 2020-12-02 DIAGNOSIS — E11.69 HYPERLIPIDEMIA ASSOCIATED WITH TYPE 2 DIABETES MELLITUS: Chronic | ICD-10-CM

## 2020-12-02 PROCEDURE — 92250 DIABETIC EYE SCREENING PHOTO: ICD-10-PCS | Mod: 26,S$PBB,, | Performed by: OPHTHALMOLOGY

## 2020-12-02 PROCEDURE — 99999 PR PBB SHADOW E&M-EST. PATIENT-LVL IV: CPT | Mod: PBBFAC,,, | Performed by: NURSE PRACTITIONER

## 2020-12-02 PROCEDURE — 99214 PR OFFICE/OUTPT VISIT, EST, LEVL IV, 30-39 MIN: ICD-10-PCS | Mod: S$PBB,,, | Performed by: NURSE PRACTITIONER

## 2020-12-02 PROCEDURE — 99214 OFFICE O/P EST MOD 30 MIN: CPT | Mod: PBBFAC | Performed by: NURSE PRACTITIONER

## 2020-12-02 PROCEDURE — 92250 FUNDUS PHOTOGRAPHY W/I&R: CPT | Mod: PBBFAC

## 2020-12-02 PROCEDURE — 92250 FUNDUS PHOTOGRAPHY W/I&R: CPT | Mod: 26,S$PBB,, | Performed by: OPHTHALMOLOGY

## 2020-12-02 PROCEDURE — 99999 PR PBB SHADOW E&M-EST. PATIENT-LVL IV: ICD-10-PCS | Mod: PBBFAC,,, | Performed by: NURSE PRACTITIONER

## 2020-12-02 PROCEDURE — 99214 OFFICE O/P EST MOD 30 MIN: CPT | Mod: S$PBB,,, | Performed by: NURSE PRACTITIONER

## 2020-12-02 RX ORDER — INSULIN ASPART 100 [IU]/ML
INJECTION, SOLUTION INTRAVENOUS; SUBCUTANEOUS
Qty: 9 VIAL | Refills: 3
Start: 2020-12-02 | End: 2020-12-08

## 2020-12-02 NOTE — PROGRESS NOTES
HPI     Diabetic Eye Exam      Additional comments: photos              Comments     Screening photos           Last edited by Teri Ricketts MA on 12/2/2020  2:44 PM. (History)            Assessment /Plan     For exam results, see Encounter Report.    Diabetic peripheral neuropathy associated with type 2 diabetes mellitus  -     Diabetic Eye Screening Photo      70 y.o. y/o here for screening for Diabetic Renopathy with non-dilated fundus photos per Manuel Yanes MD    Small pupils, no view in central field OU

## 2020-12-02 NOTE — PATIENT INSTRUCTIONS
Snacks can be an important part of a balanced, healthy meal plan. They allow you to eat more frequently, feeling full and satisfied throughout the day. Also, they allow you to spread carbohydrates evenly, which may stabilize blood sugars.  Plus, snacks are enjoyable!     The amount of carbohydrate needed at snacks varies. Generally, about 15-30 grams of carbohydrate per snack is recommended.  Below you will find some tasty treats.       0-5 gm carb   Crystal Light   Vitamin Water Zero   Herbal tea, unsweetened   2 tsp peanut butter on celery   1./2 cup sugar-free jell-o   1 sugar-free popsicle   ¼ cup blueberries   8oz Blue Rochelle unsweetened almond milk   5 baby carrots & celery sticks, cucumbers, bell peppers dipped in ¼ cup salsa, 2Tbsp light ranch dressing or 2Tbsp plain Greek yogurt   10 Goldfish crackers   ½ oz low-fat cheese or string cheese   1 closed handful of nuts, unsalted   1 Tbsp of sunflower seeds, unsalted   1 cup Smart Pop popcorn   1 whole grain brown rice cake        15 gm carb   1 small piece of fruit or ½ banana or 1/2 cup lite canned fruit   3 drea cracker squares   3 cups Smart Pop popcorn, top spray butter, Kelley lite salt or cinnamon and Truvia   5 Vanilla Wafers   ½ cup low fat, no added sugar ice cream or frozen yogurt (Blue bell, Blue Bunny, Weight Watchers, Skinny Cow)   ½ turkey, ham, or chicken sandwich   ½ c fruit with ½ c Cottage cheese   4-6 unsalted wheat crackers with 1 oz low fat cheese or 1 tbsp peanut butter    30-45 goldfish crackers (depending on flavor)    7-8 Religious mini brown rice cakes (caramel, apple cinnamon, chocolate)    12 Religious mini brown rice cakes (cheddar, bbq, ranch)    1/3 cup hummus dip with raw veg   1/2 whole wheat reece, 1Tbsp hummus   Mini Pizza (1/2 whole wheat English muffin, low-fat  cheese, tomato sauce)   100 calorie snack pack (Oreo, Chips Ahoy, Ritz Mix, Baked Cheetos)   4-6 oz. light or Greek Style yogurt  (Bonilla, Bryson, Yanique, [facility])   ½ cup sugar-free pudding     6 in. wheat tortilla or reece oven toasted chips (topped with spray butter flavoring, cinnamon, Truvia OR spray butter, garlic powder, chili powder)    18 BBQ Popchips (available at [employer], Whole Foods, Fresh Market)                   Managing Diabetes: The A1C Test       Healthy red blood cells have some glucose stuck to them. A high A1C means that unhealthy amounts of glucose are stuck to the cells.   What is the A1C test?  Using your meter helps you track your blood sugar every day. But your glucose meter tells you the value at the time of testing only. You also need to know if your treatment plan is keeping you healthy over time. The hemoglobin A1C (or glycated hemoglobin) test can help. This test measures your average blood sugar level over a few months. A higher A1C result means that you have a higher risk of developing complications.  The A1C test  The A1C is a blood test done by your healthcare provider. You will likely have an A1C test every 3 to 6 months.  Your blood glucose goal  A1C has been shown as a percentage. But it can also be shown as a number representing the estimated Average Glucose (eAG). Unlike the A1C percentage, eAG is a number similar to the numbers listed on your daily glucose monitor. Both A1C and eAG measure the amount of glucose stuck to a protein called hemoglobin in red blood cells. Your healthcare provider will help you figure out what your ideal A1C or eAG should be. Your target number will depend on your age, general health, and other factors. If your current number is too high, your treatment plan may need changes, such as different medicines.  Sample results  Most people aim for an A1c lower than 7%. Thats an eAG less than 154 mg/dL. Or, your healthcare provider may want you to aim for an A1C of 6%. Thats an eAG of 126 mg/dL.     Glucose  calculator  Visit [email or web address].org/diapro/glucose_calc for a chart that helps convert your A1C percentages into eAG numbers.   Date Last Reviewed: 6/1/2016 © 2000-2017 [employer]. [address] [place] [address], [place] [address]. All rights reserved. This information is not intended as a substitute for professional medical care. Always follow your healthcare professional's instructions.        Hypoglycemia (Low Blood Sugar)     Fast-acting sugar includes a cup of nonfat milk.     Too little sugar (glucose) in your blood is called hypoglycemia or low blood sugar. Low blood sugar usually means anything lower than 70 mg/dL. Talk with your healthcare provider about your target range and what level is too low for you. Diabetes itself doesnt cause low blood sugar. But some of the treatments for diabetes, such as pills or insulin, may raise your risk for it. Low blood sugar may cause you to pass out or have a seizure. So always treat low blood sugar right away, but don't overeat.  Special note: Always carry a source of fast-acting sugar and a snack in case of hypoglycemia.   What you may notice  If you have low blood sugar, you may have one or more of these symptoms:  · Shakiness or dizziness  · Cold, clammy skin or sweating  · Feelings of hunger  · Headache  · Nervousness  · A hard, fast heartbeat  · Weakness  · Confusion or irritability  · Blurred vision  · Having nightmares or waking up confused or sweating  · Numbness or tingling in the lips or tongue  What you should do  Here are tips to follow if you have hypoglycemia:   · First check your blood sugar. If it is too low (out of your target range), eat or drink 15 to 20 grams of fast-acting sugar. This may be 3 to 4 glucose tablets, 4 ounces (half a cup) of fruit juice or regular (nondiet) soda, 8 ounces (1 cup) of fat-free milk, or 1 tablespoon of honey. Dont take more than this, or your blood sugar may go too high.  · Wait 15 minutes. Then  recheck your blood sugar if you can.  · If your blood sugar is still too low, repeat the steps above and check your blood sugar again. If your blood sugar still has not returned to your target range, contact your healthcare provider or seek emergency care.  · Once your blood sugar returns to target range, eat a snack or meal.  Preventing low blood sugar  Things you can do include the following:   · If your condition needs a strict treatment plan, eat your meals and snacks at the same times each day. Dont skip meals!  · If your treatment plan lets you change when you eat and what you eat, learn how to change the time and dose of your rapid-acting insulin to match this.   · Ask your healthcare provider if it is safe for you to drink alcohol. Never drink on an empty stomach.  · Take your medicine at the prescribed times.  · Always carry a source of fast-acting sugar and a snack when youre away from home.  Other things to do  Additional tips include the following:  · Carry a medical ID card, a compact USB drive, or wear a medical alert bracelet or necklace. It should say that you have diabetes. It should also say what to do if you pass out or have a seizure.  · Make sure your family, friends, and coworkers know the signs of low blood sugar. Tell them what to do if your blood sugar falls very low and you cant treat yourself.  · Keep a glucagon emergency kit handy. Be sure your family, friends, and coworkers know how and when to use it. Check it regularly and replace the glucagon before it expires.  · Talk with your health care team about other things you can do to prevent low blood sugar.     If you have unexplained hypoglycemia or hypoglycemia several times, call your healthcare provider.   Date Last Reviewed: 5/1/2016  © 0669-3397 [employer]. [address] [place] [address], [place] [address]. All rights reserved. This information is not intended as a substitute for professional medical care. Always follow  your healthcare professional's instructions.        Diabetes: Sick-Day Plan  Infections, the flu, and even a cold, can cause your blood sugar to rise. And, eating less, nausea, and vomiting may cause your blood glucose to fall (hypoglycemia). Ask your healthcare provider to help you develop a sick-day plan. The following information can help.    Donts  Don'ts include the following:  · Diabetes medicines. Dont stop taking your diabetes medicine.  · Other medicines. Dont take other medicines, such as those for colds or the flu, without checking with your healthcare provider.  Dos  Do's include the following:  · Eating. Stick to your meal plan. If you cant eat, try fruit juice, regular gelatin, or frozen juice bars as directed by your healthcare provider.  · Drinking. Drink at least 1 glass of liquid every hour. If youre eating, these liquids should be sugar-free.  · Blood glucose. Check your blood sugar as often as directed by your healthcare provider. You may need to check it more often than usual.  · Ketones. Check your blood or urine for ketones. Ketones are the waste from burning fat instead of glucose for energy. Ketones are a warning sign of ketoacidosis. Ketoacidosis is a medical emergency. Ketoacidosis can happen to anyone with diabetes, but it's very rare in type 2 diabetes. It is usually only an issue if you have type 1 diabetes.  · Diabetes medicines.  ¨ Adjust your insulin according to your sick-day plan. Don't skip insulin. You need insulin even if you can't eat your normal meals.  ¨ If you take pills for diabetes (oral medicines), take your normal dose unless your healthcare provider tells you something different.  · Sugar-free medicines. Look for sugar-free cough drops and other medicines. Ask your healthcare provider if its OK for you to take these.  · Getting help. If you're alone, ask someone to check on you several times a day.  Try to get all these supplies together before you need  them.  Call your healthcare provider  Call your healthcare provider if you have any of the following:  · You vomit or have diarrhea for more than 6 hours.  · Your blood glucose level is higher than usual or more than 250 mg/dL after you have taken extra insulin (if recommended in your sick-day plan).  · You take oral medicine for diabetes, and your blood sugar is higher than usual or over 250 mg/dL, before a meal and stays that high for more than 24 hours.  · Your blood glucose is lower than usual or less than 70 mg/dL  · You have moderate to large amounts of ketones in your blood or urine.  · You arent better after 2 days.  Date Last Reviewed: 6/1/2016  © 6498-8737 [employer]. [address] [place] [address], [place] [address]. All rights reserved. This information is not intended as a substitute for professional medical care. Always follow your healthcare professional's instructions.

## 2020-12-02 NOTE — PROGRESS NOTES
CC: Patient is here for management of Type 2 diabetes and review of medical conditions as listed in visit diagnosis. She is accompanied by her .      HPI: Ms. Linda Fong is a 70 y.o. female who was diagnosed with Type 2 in 1993, on employment physical exam with labs. She has never been hospitalized r/t DM. She suffered a ICH after a fall years ago and has had decreased memory since then.  Previously tried Novolog AC.  Pt has h/o hypothyroidism, HTN, PN, AMS, TIA like symptoms, ocular HAs,     Sensitive to long gaps of not eating-eggs this am, lunch around 2p.     ER visit 1/2019- AMS, work up for stroke, NSTEMI- both negative, MRI of  Brain, EEG done.      Denies personal history of pancreatitis or pancreatic cancer. Denies family hx of thyroid cancer.     Seen by FARZANEH Tomas DNP, MANAV Hutton NP.   Last seen by me in summer 2020.  Since last pt had started vgo 40, novolog vials, trulicity 1.5 mg weekly  Using sensors- alexsander- cost $300  Will like to change suppliers    Reports appetite is decreasing  1/2 sandwich  Fruits  Cuts down at dinner time-portions    Exercises: cycle    Lab Results   Component Value Date    HGBA1C 6.6 (H) 11/25/2020     CURRENT DIABETIC MEDS:  Insulin pump 1.67 u/hr , 12 units before meals, self adjustment per scale (insulin) 180-230+2, 231-280+4, 281-330+6, etc,  Metformin 1,000 mg bid-may skip evening at times, Trulicity 1.5 mg weekly    Uses Vials or Pens: Pens  Rarely missing doses of diabetes medications.     Type of Glucose Meter: Accu-Chek Compact, alexsander sensor -does not have reader today     On insulin pump, injections 4x a day  Testing 4 x a day  Patient is willing and able to use the device  Demonstrated an understanding of the technology and is motivated to use CGM  Patient expected to adhere to a comprehensive diabetes treatment plan and patient has adequate medical supervision  Patient experiences multiple impaired awareness of hypoglycemia (hypoglycemia unawareness)    D/t  "pandemic (covid19) it is imperative for pt to have BG levels monitored closely for optimal health      Not currently consistently exercising, but has membership to [employer]    Diabetes Management Status    Statin: Taking  ACE/ARB: Taking    Screening or Prevention Patient's value Goal Complete/Controlled?   HgA1C Testing and Control   Lab Results   Component Value Date    HGBA1C 6.6 (H) 11/25/2020      Annually/Less than 8% Yes   Lipid profile : 05/12/2020 Annually Yes   LDL control Lab Results   Component Value Date    LDLCALC 76.4 05/12/2020    Annually/Less than 100 mg/dl  Yes   Nephropathy screening Lab Results   Component Value Date    LABMICR 14.0 11/25/2020     Lab Results   Component Value Date    PROTEINUA Negative 01/05/2019    Annually Yes   Blood pressure BP Readings from Last 1 Encounters:   12/02/20 (!) 142/72    Less than 140/90 Yes   Dilated retinal exam : 11/09/2018 Annually Yes   Foot exam   : 05/20/2020 Annually Yes     REVIEW OF SYSTEMS  General: no weakness; c/o fatigue-improving   Eyes: +vision changes- needs appt, no blurry vision or eye pain.    Cardiac: no chest pain or palpitations.   Respiratory: no cough or dyspnea. +allergies- on claritin  GI: no abdominal pain or nausea.   Skin: no rashes, wounds, or insulin injection site reactions.   Neuro: occ numbness or tingling; no dizziness, +memory loss  Endocrine: no polyuria, polydipsia, polyphagia.   Psych: no anxiety or depression.   MS: chronic right hip, lower back pain at times.     Vital Signs  BP (!) 142/72 (BP Location: Left arm, Patient Position: Sitting, BP Method: Medium (Manual))   Pulse 82   Temp 98.3 °F (36.8 °C) (Oral)   Resp 18   Ht 5' 4" (1.626 m)   Wt 81.6 kg (179 lb 14.3 oz)   SpO2 96%   BMI 30.88 kg/m²     Hemoglobin A1C   Date Value Ref Range Status   11/25/2020 6.6 (H) 4.0 - 5.6 % Final     Comment:     ADA Screening Guidelines:  5.7-6.4%  Consistent with prediabetes  >or=6.5%  Consistent with " diabetes  High levels of fetal hemoglobin interfere with the HbA1C  assay. Heterozygous hemoglobin variants (HbS, HgC, etc)do  not significantly interfere with this assay.   However, presence of multiple variants may affect accuracy.     05/12/2020 8.2 (H) 4.0 - 5.6 % Final     Comment:     ADA Screening Guidelines:  5.7-6.4%  Consistent with prediabetes  >or=6.5%  Consistent with diabetes  High levels of fetal hemoglobin interfere with the HbA1C  assay. Heterozygous hemoglobin variants (HbS, HgC, etc)do  not significantly interfere with this assay.   However, presence of multiple variants may affect accuracy.     01/09/2020 8.0 (H) 4.0 - 5.6 % Final     Comment:     ADA Screening Guidelines:  5.7-6.4%  Consistent with prediabetes  >or=6.5%  Consistent with diabetes  High levels of fetal hemoglobin interfere with the HbA1C  assay. Heterozygous hemoglobin variants (HbS, HgC, etc)do  not significantly interfere with this assay.   However, presence of multiple variants may affect accuracy.         Chemistry        Component Value Date/Time     05/12/2020 0904    K 4.8 05/12/2020 0904     05/12/2020 0904    CO2 28 05/12/2020 0904    BUN 15 05/12/2020 0904    CREATININE 0.8 05/12/2020 0904     (H) 05/12/2020 0904        Component Value Date/Time    CALCIUM 9.5 05/12/2020 0904    ALKPHOS 78 05/12/2020 0904    AST 31 05/12/2020 0904    ALT 30 05/12/2020 0904    BILITOT 1.2 (H) 05/12/2020 0904        Lab Results   Component Value Date    CHOL 156 05/12/2020    CHOL 142 02/25/2019    CHOL 213 (H) 01/04/2019     Lab Results   Component Value Date    HDL 49 05/12/2020    HDL 52 02/25/2019    HDL 52 01/04/2019     Lab Results   Component Value Date    LDLCALC 76.4 05/12/2020    LDLCALC 56.6 (L) 02/25/2019    LDLCALC Invalid, Trig>400.0 01/04/2019     Lab Results   Component Value Date    TRIG 153 (H) 05/12/2020    TRIG 167 (H) 02/25/2019    TRIG 506 (H) 01/04/2019     Lab Results   Component Value Date    TSH  3.024 11/25/2020     Lab Results   Component Value Date    MICALBCREAT 18.7 11/25/2020     PHYSICAL EXAMINATION  Constitutional: Appears well, alert, oriented.  Neck: Supple, trachea midline.   Respiratory:  even and unlabored.  Lymph: no edema.   Skin: warm and dry; no insulin site reactions observed.  Neuro: patient alert and cooperative.  Feet: appropriate footwear    Assessment and Plan    1. Diabetic peripheral neuropathy associated with type 2 diabetes mellitus  Diabetic Eye Screening Photo    Hemoglobin A1C   2. Type 2 diabetes mellitus with diabetic polyneuropathy, with long-term current use of insulin     3. Hypothyroidism, acquired     4. Hypertension associated with diabetes     5. Hyperlipidemia associated with type 2 diabetes mellitus     6. Environmental and seasonal allergies       1. -2. F/u in 3-4 mos   Continue 12 units before mealas  Insulin delivery system -[facility]   [facility]-switch to dms  Continue regimen with trulicity 1.5 mg weekly, metformin 1000 mg bid  a1c best in a long time!!!  At goal, goal less than 7%  3.   Lab Results   Component Value Date    TSH 3.024 11/25/2020     At goal   Continue lt4  4. Continue med(s)  Sl elevated  5.   Lab Results   Component Value Date    LDLCALC 76.4 05/12/2020     At goal   6. On claritin         Follow up in about 3 months (around 3/2/2021).   [place]abs at [facility] [place] [facility] prior to appt

## 2020-12-08 RX ORDER — INSULIN ASPART 100 [IU]/ML
INJECTION, SOLUTION INTRAVENOUS; SUBCUTANEOUS
Qty: 9 VIAL | Refills: 3 | Status: SHIPPED | OUTPATIENT
Start: 2020-12-08 | End: 2021-07-01 | Stop reason: SDUPTHER

## 2020-12-08 RX ORDER — SUB-Q INSULIN DEVICE, 40 UNIT
EACH MISCELLANEOUS
Qty: 90 DEVICE | Refills: 3 | Status: SHIPPED | OUTPATIENT
Start: 2020-12-08 | End: 2021-03-22 | Stop reason: DRUGHIGH

## 2020-12-11 ENCOUNTER — PATIENT MESSAGE (OUTPATIENT)
Dept: OTHER | Facility: OTHER | Age: 70
End: 2020-12-11

## 2020-12-15 ENCOUNTER — TELEPHONE (OUTPATIENT)
Dept: INTERNAL MEDICINE | Facility: CLINIC | Age: 70
End: 2020-12-15

## 2020-12-15 NOTE — TELEPHONE ENCOUNTER
----- Message from Dilshad Knott sent at 12/15/2020 10:24 AM CST -----  Contact: self  Pt is asking for a call back in regards to the glucose meter order from [facility]       Contact info- [phone]

## 2020-12-16 RX ORDER — LANCETS
EACH MISCELLANEOUS
Qty: 300 EACH | Refills: 3 | Status: SHIPPED | OUTPATIENT
Start: 2020-12-16 | End: 2021-08-10 | Stop reason: SDUPTHER

## 2020-12-16 RX ORDER — INSULIN PUMP SYRINGE, 3 ML
EACH MISCELLANEOUS
Qty: 1 EACH | Refills: 0 | Status: SHIPPED | OUTPATIENT
Start: 2020-12-16 | End: 2022-08-24 | Stop reason: SDUPTHER

## 2020-12-24 ENCOUNTER — TELEPHONE (OUTPATIENT)
Dept: INTERNAL MEDICINE | Facility: CLINIC | Age: 70
End: 2020-12-24

## 2020-12-24 NOTE — TELEPHONE ENCOUNTER
----- Message from Keyona Arias sent at 12/24/2020 10:16 AM CST -----  Contact: self [phone]  Patient is returning a phone call.  Who left a message for the patient: kevin suárez   Does patient know what this is regarding:    Comments: if ref to a glucose meter

## 2020-12-24 NOTE — TELEPHONE ENCOUNTER
----- Message from Keyona Arias sent at 12/24/2020 10:16 AM CST -----  Contact: self [phone]  Patient is returning a phone call.  Who left a message for the patient: kevin suárez   Does patient know what this is regarding:    Comments: if ref to a glucose meter

## 2021-01-28 ENCOUNTER — NURSE TRIAGE (OUTPATIENT)
Dept: ADMINISTRATIVE | Facility: CLINIC | Age: 71
End: 2021-01-28

## 2021-01-31 ENCOUNTER — EXTERNAL CHRONIC CARE MANAGEMENT (OUTPATIENT)
Dept: PRIMARY CARE CLINIC | Facility: CLINIC | Age: 71
End: 2021-01-31
Payer: MEDICARE

## 2021-01-31 PROCEDURE — 99490 CHRNC CARE MGMT STAFF 1ST 20: CPT | Mod: PBBFAC,25,27 | Performed by: INTERNAL MEDICINE

## 2021-01-31 PROCEDURE — 99490 CHRNC CARE MGMT STAFF 1ST 20: CPT | Mod: S$PBB,,, | Performed by: INTERNAL MEDICINE

## 2021-01-31 PROCEDURE — 99439 PR CHRONIC CARE MGMT, EA ADDTL 20 MIN: ICD-10-PCS | Mod: S$PBB,,, | Performed by: INTERNAL MEDICINE

## 2021-01-31 PROCEDURE — 99439 CHRNC CARE MGMT STAF EA ADDL: CPT | Mod: PBBFAC,25,27 | Performed by: INTERNAL MEDICINE

## 2021-01-31 PROCEDURE — 99439 CHRNC CARE MGMT STAF EA ADDL: CPT | Mod: S$PBB,,, | Performed by: INTERNAL MEDICINE

## 2021-01-31 PROCEDURE — 99490 PR CHRONIC CARE MGMT, 1ST 20 MIN: ICD-10-PCS | Mod: S$PBB,,, | Performed by: INTERNAL MEDICINE

## 2021-02-01 ENCOUNTER — CLINICAL SUPPORT (OUTPATIENT)
Dept: URGENT CARE | Facility: CLINIC | Age: 71
End: 2021-02-01
Payer: MEDICARE

## 2021-02-01 VITALS — TEMPERATURE: 98 F

## 2021-02-01 DIAGNOSIS — Z11.52 ENCOUNTER FOR SCREENING FOR COVID-19: Primary | ICD-10-CM

## 2021-02-01 LAB
CTP QC/QA: YES
SARS-COV-2 RDRP RESP QL NAA+PROBE: NEGATIVE

## 2021-02-28 ENCOUNTER — EXTERNAL CHRONIC CARE MANAGEMENT (OUTPATIENT)
Dept: PRIMARY CARE CLINIC | Facility: CLINIC | Age: 71
End: 2021-02-28
Payer: MEDICARE

## 2021-02-28 PROCEDURE — 99490 PR CHRONIC CARE MGMT, 1ST 20 MIN: ICD-10-PCS | Mod: S$PBB,,, | Performed by: INTERNAL MEDICINE

## 2021-02-28 PROCEDURE — 99490 CHRNC CARE MGMT STAFF 1ST 20: CPT | Mod: S$PBB,,, | Performed by: INTERNAL MEDICINE

## 2021-02-28 PROCEDURE — 99490 CHRNC CARE MGMT STAFF 1ST 20: CPT | Mod: PBBFAC | Performed by: INTERNAL MEDICINE

## 2021-03-03 ENCOUNTER — LAB VISIT (OUTPATIENT)
Dept: LAB | Facility: HOSPITAL | Age: 71
End: 2021-03-03
Attending: INTERNAL MEDICINE
Payer: MEDICARE

## 2021-03-03 DIAGNOSIS — E11.42 DIABETIC PERIPHERAL NEUROPATHY ASSOCIATED WITH TYPE 2 DIABETES MELLITUS: Chronic | ICD-10-CM

## 2021-03-03 LAB
ESTIMATED AVG GLUCOSE: 137 MG/DL (ref 68–131)
HBA1C MFR BLD: 6.4 % (ref 4–5.6)

## 2021-03-03 PROCEDURE — 36415 COLL VENOUS BLD VENIPUNCTURE: CPT

## 2021-03-03 PROCEDURE — 83036 HEMOGLOBIN GLYCOSYLATED A1C: CPT

## 2021-03-10 ENCOUNTER — TELEPHONE (OUTPATIENT)
Dept: INTERNAL MEDICINE | Facility: CLINIC | Age: 71
End: 2021-03-10

## 2021-03-10 ENCOUNTER — OFFICE VISIT (OUTPATIENT)
Dept: INTERNAL MEDICINE | Facility: CLINIC | Age: 71
End: 2021-03-10
Payer: MEDICARE

## 2021-03-10 VITALS
DIASTOLIC BLOOD PRESSURE: 70 MMHG | SYSTOLIC BLOOD PRESSURE: 122 MMHG | HEIGHT: 61 IN | WEIGHT: 176 LBS | HEART RATE: 68 BPM | OXYGEN SATURATION: 95 % | BODY MASS INDEX: 33.23 KG/M2

## 2021-03-10 DIAGNOSIS — R41.82 ALTERED MENTAL STATUS, UNSPECIFIED ALTERED MENTAL STATUS TYPE: ICD-10-CM

## 2021-03-10 DIAGNOSIS — I15.2 HYPERTENSION ASSOCIATED WITH DIABETES: Chronic | ICD-10-CM

## 2021-03-10 DIAGNOSIS — R41.3 MEMORY DEFICIT: ICD-10-CM

## 2021-03-10 DIAGNOSIS — Z79.4 TYPE 2 DIABETES MELLITUS WITH DIABETIC POLYNEUROPATHY, WITH LONG-TERM CURRENT USE OF INSULIN: Primary | Chronic | ICD-10-CM

## 2021-03-10 DIAGNOSIS — E55.9 VITAMIN D DEFICIENCY DISEASE: Primary | ICD-10-CM

## 2021-03-10 DIAGNOSIS — E11.42 TYPE 2 DIABETES MELLITUS WITH DIABETIC POLYNEUROPATHY, WITH LONG-TERM CURRENT USE OF INSULIN: Primary | Chronic | ICD-10-CM

## 2021-03-10 DIAGNOSIS — E03.9 HYPOTHYROIDISM, ACQUIRED: Chronic | ICD-10-CM

## 2021-03-10 DIAGNOSIS — E04.2 MULTINODULAR GOITER: ICD-10-CM

## 2021-03-10 DIAGNOSIS — E11.69 HYPERLIPIDEMIA ASSOCIATED WITH TYPE 2 DIABETES MELLITUS: Chronic | ICD-10-CM

## 2021-03-10 DIAGNOSIS — E78.5 HYPERLIPIDEMIA ASSOCIATED WITH TYPE 2 DIABETES MELLITUS: Chronic | ICD-10-CM

## 2021-03-10 DIAGNOSIS — E11.59 HYPERTENSION ASSOCIATED WITH DIABETES: Chronic | ICD-10-CM

## 2021-03-10 DIAGNOSIS — E11.42 DIABETIC PERIPHERAL NEUROPATHY ASSOCIATED WITH TYPE 2 DIABETES MELLITUS: Chronic | ICD-10-CM

## 2021-03-10 PROCEDURE — 99999 PR PBB SHADOW E&M-EST. PATIENT-LVL V: ICD-10-PCS | Mod: PBBFAC,,, | Performed by: NURSE PRACTITIONER

## 2021-03-10 PROCEDURE — 99999 PR PBB SHADOW E&M-EST. PATIENT-LVL V: CPT | Mod: PBBFAC,,, | Performed by: NURSE PRACTITIONER

## 2021-03-10 PROCEDURE — 99214 PR OFFICE/OUTPT VISIT, EST, LEVL IV, 30-39 MIN: ICD-10-PCS | Mod: S$PBB,,, | Performed by: NURSE PRACTITIONER

## 2021-03-10 PROCEDURE — 99214 OFFICE O/P EST MOD 30 MIN: CPT | Mod: S$PBB,,, | Performed by: NURSE PRACTITIONER

## 2021-03-10 PROCEDURE — 99215 OFFICE O/P EST HI 40 MIN: CPT | Mod: PBBFAC | Performed by: NURSE PRACTITIONER

## 2021-03-10 RX ORDER — LEVOTHYROXINE SODIUM 25 UG/1
TABLET ORAL
Qty: 90 TABLET | Refills: 3 | Status: SHIPPED | OUTPATIENT
Start: 2021-03-10 | End: 2021-03-22 | Stop reason: SDUPTHER

## 2021-03-11 ENCOUNTER — CLINICAL SUPPORT (OUTPATIENT)
Dept: URGENT CARE | Facility: CLINIC | Age: 71
End: 2021-03-11
Payer: MEDICARE

## 2021-03-11 VITALS — TEMPERATURE: 98 F

## 2021-03-11 DIAGNOSIS — Z11.52 ENCOUNTER FOR SCREENING FOR COVID-19: Primary | ICD-10-CM

## 2021-03-11 LAB
CTP QC/QA: YES
SARS-COV-2 RDRP RESP QL NAA+PROBE: NEGATIVE

## 2021-03-18 ENCOUNTER — PATIENT MESSAGE (OUTPATIENT)
Dept: INTERNAL MEDICINE | Facility: CLINIC | Age: 71
End: 2021-03-18

## 2021-03-20 ENCOUNTER — PATIENT MESSAGE (OUTPATIENT)
Dept: INTERNAL MEDICINE | Facility: CLINIC | Age: 71
End: 2021-03-20

## 2021-03-22 ENCOUNTER — TELEPHONE (OUTPATIENT)
Dept: INTERNAL MEDICINE | Facility: CLINIC | Age: 71
End: 2021-03-22

## 2021-03-22 ENCOUNTER — PATIENT MESSAGE (OUTPATIENT)
Dept: INTERNAL MEDICINE | Facility: CLINIC | Age: 71
End: 2021-03-22

## 2021-03-22 RX ORDER — SUB-Q INSULIN DEVICE, 40 UNIT
EACH MISCELLANEOUS
Qty: 90 DEVICE | Refills: 1 | Status: SHIPPED | OUTPATIENT
Start: 2021-03-22 | End: 2021-05-03 | Stop reason: SDUPTHER

## 2021-03-22 RX ORDER — LEVOTHYROXINE SODIUM 25 UG/1
TABLET ORAL
Qty: 90 TABLET | Refills: 3 | Status: SHIPPED | OUTPATIENT
Start: 2021-03-22 | End: 2021-08-10 | Stop reason: SDUPTHER

## 2021-03-29 ENCOUNTER — OFFICE VISIT (OUTPATIENT)
Dept: NEUROLOGY | Facility: CLINIC | Age: 71
End: 2021-03-29
Payer: MEDICARE

## 2021-03-29 VITALS
DIASTOLIC BLOOD PRESSURE: 82 MMHG | SYSTOLIC BLOOD PRESSURE: 125 MMHG | BODY MASS INDEX: 33.68 KG/M2 | HEART RATE: 84 BPM | WEIGHT: 178.25 LBS

## 2021-03-29 DIAGNOSIS — R41.3 MEMORY DEFICIT: ICD-10-CM

## 2021-03-29 PROCEDURE — 99204 PR OFFICE/OUTPT VISIT, NEW, LEVL IV, 45-59 MIN: ICD-10-PCS | Mod: S$PBB,,, | Performed by: PSYCHIATRY & NEUROLOGY

## 2021-03-29 PROCEDURE — 99214 OFFICE O/P EST MOD 30 MIN: CPT | Mod: PBBFAC | Performed by: PSYCHIATRY & NEUROLOGY

## 2021-03-29 PROCEDURE — 99204 OFFICE O/P NEW MOD 45 MIN: CPT | Mod: S$PBB,,, | Performed by: PSYCHIATRY & NEUROLOGY

## 2021-03-29 PROCEDURE — 99999 PR PBB SHADOW E&M-EST. PATIENT-LVL IV: ICD-10-PCS | Mod: PBBFAC,,, | Performed by: PSYCHIATRY & NEUROLOGY

## 2021-03-29 PROCEDURE — 99999 PR PBB SHADOW E&M-EST. PATIENT-LVL IV: CPT | Mod: PBBFAC,,, | Performed by: PSYCHIATRY & NEUROLOGY

## 2021-03-31 ENCOUNTER — EXTERNAL CHRONIC CARE MANAGEMENT (OUTPATIENT)
Dept: PRIMARY CARE CLINIC | Facility: CLINIC | Age: 71
End: 2021-03-31
Payer: MEDICARE

## 2021-03-31 PROCEDURE — 99490 PR CHRONIC CARE MGMT, 1ST 20 MIN: ICD-10-PCS | Mod: S$PBB,,, | Performed by: INTERNAL MEDICINE

## 2021-03-31 PROCEDURE — 99490 CHRNC CARE MGMT STAFF 1ST 20: CPT | Mod: PBBFAC | Performed by: INTERNAL MEDICINE

## 2021-03-31 PROCEDURE — 99490 CHRNC CARE MGMT STAFF 1ST 20: CPT | Mod: S$PBB,,, | Performed by: INTERNAL MEDICINE

## 2021-05-03 ENCOUNTER — TELEPHONE (OUTPATIENT)
Dept: NEUROLOGY | Facility: CLINIC | Age: 71
End: 2021-05-03

## 2021-05-04 RX ORDER — SUB-Q INSULIN DEVICE, 40 UNIT
EACH MISCELLANEOUS
Qty: 90 DEVICE | Refills: 1 | Status: SHIPPED | OUTPATIENT
Start: 2021-05-04 | End: 2021-05-17 | Stop reason: SDUPTHER

## 2021-05-12 ENCOUNTER — TELEPHONE (OUTPATIENT)
Dept: OPTOMETRY | Facility: CLINIC | Age: 71
End: 2021-05-12

## 2021-05-17 ENCOUNTER — PATIENT MESSAGE (OUTPATIENT)
Dept: INTERNAL MEDICINE | Facility: CLINIC | Age: 71
End: 2021-05-17

## 2021-05-17 RX ORDER — SUB-Q INSULIN DEVICE, 40 UNIT
EACH MISCELLANEOUS
Qty: 90 DEVICE | Refills: 1 | Status: SHIPPED | OUTPATIENT
Start: 2021-05-17 | End: 2021-08-10 | Stop reason: SDUPTHER

## 2021-05-26 ENCOUNTER — PATIENT MESSAGE (OUTPATIENT)
Dept: ADMINISTRATIVE | Facility: OTHER | Age: 71
End: 2021-05-26

## 2021-05-26 ENCOUNTER — PATIENT OUTREACH (OUTPATIENT)
Dept: ADMINISTRATIVE | Facility: OTHER | Age: 71
End: 2021-05-26

## 2021-05-26 DIAGNOSIS — Z12.31 ENCOUNTER FOR SCREENING MAMMOGRAM FOR MALIGNANT NEOPLASM OF BREAST: Primary | ICD-10-CM

## 2021-05-27 ENCOUNTER — TELEPHONE (OUTPATIENT)
Dept: INTERNAL MEDICINE | Facility: CLINIC | Age: 71
End: 2021-05-27

## 2021-05-27 ENCOUNTER — OFFICE VISIT (OUTPATIENT)
Dept: DERMATOLOGY | Facility: CLINIC | Age: 71
End: 2021-05-27
Payer: MEDICARE

## 2021-05-27 DIAGNOSIS — L71.9 ROSACEA: ICD-10-CM

## 2021-05-27 DIAGNOSIS — L57.0 AK (ACTINIC KERATOSIS): Primary | ICD-10-CM

## 2021-05-27 PROCEDURE — 99213 OFFICE O/P EST LOW 20 MIN: CPT | Mod: 25,S$PBB,, | Performed by: DERMATOLOGY

## 2021-05-27 PROCEDURE — 99999 PR PBB SHADOW E&M-EST. PATIENT-LVL III: ICD-10-PCS | Mod: PBBFAC,,, | Performed by: DERMATOLOGY

## 2021-05-27 PROCEDURE — 99999 PR PBB SHADOW E&M-EST. PATIENT-LVL III: CPT | Mod: PBBFAC,,, | Performed by: DERMATOLOGY

## 2021-05-27 PROCEDURE — 99213 PR OFFICE/OUTPT VISIT, EST, LEVL III, 20-29 MIN: ICD-10-PCS | Mod: 25,S$PBB,, | Performed by: DERMATOLOGY

## 2021-05-27 PROCEDURE — 17000 DESTRUCT PREMALG LESION: CPT | Mod: PBBFAC,PO | Performed by: DERMATOLOGY

## 2021-05-27 PROCEDURE — 99213 OFFICE O/P EST LOW 20 MIN: CPT | Mod: PBBFAC,PO | Performed by: DERMATOLOGY

## 2021-05-27 PROCEDURE — 17000 DESTRUCT PREMALG LESION: CPT | Mod: S$PBB,,, | Performed by: DERMATOLOGY

## 2021-05-27 PROCEDURE — 17000 PR DESTRUCTION(LASER SURGERY,CRYOSURGERY,CHEMOSURGERY),PREMALIGNANT LESIONS,FIRST LESION: ICD-10-PCS | Mod: S$PBB,,, | Performed by: DERMATOLOGY

## 2021-06-07 ENCOUNTER — TELEPHONE (OUTPATIENT)
Dept: INTERNAL MEDICINE | Facility: CLINIC | Age: 71
End: 2021-06-07

## 2021-06-14 ENCOUNTER — OFFICE VISIT (OUTPATIENT)
Dept: OPTOMETRY | Facility: CLINIC | Age: 71
End: 2021-06-14
Payer: MEDICARE

## 2021-06-14 DIAGNOSIS — E11.42 TYPE 2 DIABETES MELLITUS WITH DIABETIC POLYNEUROPATHY, WITH LONG-TERM CURRENT USE OF INSULIN: Chronic | ICD-10-CM

## 2021-06-14 DIAGNOSIS — Z79.4 TYPE 2 DIABETES MELLITUS WITH DIABETIC POLYNEUROPATHY, WITH LONG-TERM CURRENT USE OF INSULIN: Chronic | ICD-10-CM

## 2021-06-14 DIAGNOSIS — H52.4 HYPEROPIA WITH ASTIGMATISM AND PRESBYOPIA, BILATERAL: ICD-10-CM

## 2021-06-14 DIAGNOSIS — H35.033 HYPERTENSIVE RETINOPATHY OF BOTH EYES: ICD-10-CM

## 2021-06-14 DIAGNOSIS — H25.13 NUCLEAR SCLEROSIS OF BOTH EYES: ICD-10-CM

## 2021-06-14 DIAGNOSIS — H01.024 SQUAMOUS BLEPHARITIS OF UPPER EYELIDS OF BOTH EYES: ICD-10-CM

## 2021-06-14 DIAGNOSIS — H52.03 HYPEROPIA WITH ASTIGMATISM AND PRESBYOPIA, BILATERAL: ICD-10-CM

## 2021-06-14 DIAGNOSIS — H01.021 SQUAMOUS BLEPHARITIS OF UPPER EYELIDS OF BOTH EYES: ICD-10-CM

## 2021-06-14 DIAGNOSIS — E11.9 TYPE 2 DIABETES MELLITUS WITHOUT RETINOPATHY: Primary | ICD-10-CM

## 2021-06-14 DIAGNOSIS — G43.109 OCULAR MIGRAINE: ICD-10-CM

## 2021-06-14 DIAGNOSIS — H52.203 HYPEROPIA WITH ASTIGMATISM AND PRESBYOPIA, BILATERAL: ICD-10-CM

## 2021-06-14 PROCEDURE — 99999 PR PBB SHADOW E&M-EST. PATIENT-LVL II: CPT | Mod: PBBFAC,,, | Performed by: OPTOMETRIST

## 2021-06-14 PROCEDURE — 92015 DETERMINE REFRACTIVE STATE: CPT | Mod: ,,, | Performed by: OPTOMETRIST

## 2021-06-14 PROCEDURE — 99212 OFFICE O/P EST SF 10 MIN: CPT | Mod: PBBFAC | Performed by: OPTOMETRIST

## 2021-06-14 PROCEDURE — 92014 PR EYE EXAM, EST PATIENT,COMPREHESV: ICD-10-PCS | Mod: S$PBB,,, | Performed by: OPTOMETRIST

## 2021-06-14 PROCEDURE — 92014 COMPRE OPH EXAM EST PT 1/>: CPT | Mod: S$PBB,,, | Performed by: OPTOMETRIST

## 2021-06-14 PROCEDURE — 92015 PR REFRACTION: ICD-10-PCS | Mod: ,,, | Performed by: OPTOMETRIST

## 2021-06-14 PROCEDURE — 99999 PR PBB SHADOW E&M-EST. PATIENT-LVL II: ICD-10-PCS | Mod: PBBFAC,,, | Performed by: OPTOMETRIST

## 2021-06-29 ENCOUNTER — TELEPHONE (OUTPATIENT)
Dept: INTERNAL MEDICINE | Facility: CLINIC | Age: 71
End: 2021-06-29

## 2021-06-29 ENCOUNTER — OFFICE VISIT (OUTPATIENT)
Dept: NEUROLOGY | Facility: CLINIC | Age: 71
End: 2021-06-29
Payer: MEDICARE

## 2021-06-29 VITALS
DIASTOLIC BLOOD PRESSURE: 72 MMHG | HEIGHT: 61 IN | WEIGHT: 178.13 LBS | SYSTOLIC BLOOD PRESSURE: 126 MMHG | BODY MASS INDEX: 33.63 KG/M2 | HEART RATE: 102 BPM

## 2021-06-29 DIAGNOSIS — G43.109 MIGRAINE AURA WITHOUT HEADACHE: ICD-10-CM

## 2021-06-29 DIAGNOSIS — R41.3 MEMORY DEFICIT: Primary | ICD-10-CM

## 2021-06-29 PROCEDURE — 99999 PR PBB SHADOW E&M-EST. PATIENT-LVL V: CPT | Mod: PBBFAC,,, | Performed by: PSYCHIATRY & NEUROLOGY

## 2021-06-29 PROCEDURE — 99214 OFFICE O/P EST MOD 30 MIN: CPT | Mod: S$PBB,,, | Performed by: PSYCHIATRY & NEUROLOGY

## 2021-06-29 PROCEDURE — 99215 OFFICE O/P EST HI 40 MIN: CPT | Mod: PBBFAC | Performed by: PSYCHIATRY & NEUROLOGY

## 2021-06-29 PROCEDURE — 99214 PR OFFICE/OUTPT VISIT, EST, LEVL IV, 30-39 MIN: ICD-10-PCS | Mod: S$PBB,,, | Performed by: PSYCHIATRY & NEUROLOGY

## 2021-06-29 PROCEDURE — 99999 PR PBB SHADOW E&M-EST. PATIENT-LVL V: ICD-10-PCS | Mod: PBBFAC,,, | Performed by: PSYCHIATRY & NEUROLOGY

## 2021-06-29 RX ORDER — INSULIN ASPART 100 [IU]/ML
INJECTION, SOLUTION INTRAVENOUS; SUBCUTANEOUS
Qty: 4 VIAL | Refills: 6 | Status: SHIPPED | OUTPATIENT
Start: 2021-06-29 | End: 2021-08-10

## 2021-07-01 ENCOUNTER — TELEPHONE (OUTPATIENT)
Dept: INTERNAL MEDICINE | Facility: CLINIC | Age: 71
End: 2021-07-01

## 2021-07-01 RX ORDER — INSULIN ASPART 100 [IU]/ML
INJECTION, SOLUTION INTRAVENOUS; SUBCUTANEOUS
Qty: 12 VIAL | Refills: 3 | Status: SHIPPED | OUTPATIENT
Start: 2021-07-01 | End: 2021-08-10 | Stop reason: SDUPTHER

## 2021-08-02 ENCOUNTER — PATIENT MESSAGE (OUTPATIENT)
Dept: INTERNAL MEDICINE | Facility: CLINIC | Age: 71
End: 2021-08-02

## 2021-08-02 ENCOUNTER — TELEPHONE (OUTPATIENT)
Dept: INTERNAL MEDICINE | Facility: CLINIC | Age: 71
End: 2021-08-02

## 2021-08-02 RX ORDER — FLASH GLUCOSE SENSOR
KIT MISCELLANEOUS
Qty: 7 KIT | Refills: 3 | Status: SHIPPED | OUTPATIENT
Start: 2021-08-02 | End: 2021-08-10

## 2021-08-02 RX ORDER — FLASH GLUCOSE SENSOR
KIT MISCELLANEOUS
Qty: 2 KIT | Refills: 6 | Status: SHIPPED | OUTPATIENT
Start: 2021-08-02 | End: 2021-08-04 | Stop reason: SDUPTHER

## 2021-08-02 RX ORDER — FLASH GLUCOSE SCANNING READER
EACH MISCELLANEOUS
Qty: 1 EACH | Refills: 0 | Status: SHIPPED | OUTPATIENT
Start: 2021-08-02 | End: 2021-08-10

## 2021-08-04 RX ORDER — FLASH GLUCOSE SENSOR
KIT MISCELLANEOUS
Qty: 2 KIT | Refills: 6 | Status: SHIPPED | OUTPATIENT
Start: 2021-08-04 | End: 2021-08-10 | Stop reason: SDUPTHER

## 2021-08-05 ENCOUNTER — LAB VISIT (OUTPATIENT)
Dept: LAB | Facility: HOSPITAL | Age: 71
End: 2021-08-05
Attending: NURSE PRACTITIONER
Payer: MEDICARE

## 2021-08-05 DIAGNOSIS — E11.42 TYPE 2 DIABETES MELLITUS WITH DIABETIC POLYNEUROPATHY, WITH LONG-TERM CURRENT USE OF INSULIN: Chronic | ICD-10-CM

## 2021-08-05 DIAGNOSIS — Z79.4 TYPE 2 DIABETES MELLITUS WITH DIABETIC POLYNEUROPATHY, WITH LONG-TERM CURRENT USE OF INSULIN: Chronic | ICD-10-CM

## 2021-08-05 LAB
ESTIMATED AVG GLUCOSE: 151 MG/DL (ref 68–131)
HBA1C MFR BLD: 6.9 % (ref 4–5.6)

## 2021-08-05 PROCEDURE — 83036 HEMOGLOBIN GLYCOSYLATED A1C: CPT | Performed by: NURSE PRACTITIONER

## 2021-08-05 PROCEDURE — 36415 COLL VENOUS BLD VENIPUNCTURE: CPT | Performed by: NURSE PRACTITIONER

## 2021-08-10 ENCOUNTER — OFFICE VISIT (OUTPATIENT)
Dept: INTERNAL MEDICINE | Facility: CLINIC | Age: 71
End: 2021-08-10
Payer: MEDICARE

## 2021-08-10 ENCOUNTER — TELEPHONE (OUTPATIENT)
Dept: NEUROLOGY | Facility: CLINIC | Age: 71
End: 2021-08-10

## 2021-08-10 VITALS
DIASTOLIC BLOOD PRESSURE: 64 MMHG | BODY MASS INDEX: 29.99 KG/M2 | SYSTOLIC BLOOD PRESSURE: 126 MMHG | WEIGHT: 175.69 LBS | HEART RATE: 90 BPM | OXYGEN SATURATION: 98 % | HEIGHT: 64 IN

## 2021-08-10 DIAGNOSIS — R41.3 MEMORY DEFICIT: ICD-10-CM

## 2021-08-10 DIAGNOSIS — E11.42 TYPE 2 DIABETES MELLITUS WITH DIABETIC POLYNEUROPATHY, WITH LONG-TERM CURRENT USE OF INSULIN: Primary | Chronic | ICD-10-CM

## 2021-08-10 DIAGNOSIS — E03.9 HYPOTHYROIDISM, ACQUIRED: Chronic | ICD-10-CM

## 2021-08-10 DIAGNOSIS — I15.2 HYPERTENSION ASSOCIATED WITH DIABETES: Chronic | ICD-10-CM

## 2021-08-10 DIAGNOSIS — E11.69 HYPERLIPIDEMIA ASSOCIATED WITH TYPE 2 DIABETES MELLITUS: Chronic | ICD-10-CM

## 2021-08-10 DIAGNOSIS — Z79.4 TYPE 2 DIABETES MELLITUS WITH DIABETIC POLYNEUROPATHY, WITH LONG-TERM CURRENT USE OF INSULIN: Primary | Chronic | ICD-10-CM

## 2021-08-10 DIAGNOSIS — E03.8 SUBCLINICAL HYPOTHYROIDISM: ICD-10-CM

## 2021-08-10 DIAGNOSIS — E11.59 HYPERTENSION ASSOCIATED WITH DIABETES: Chronic | ICD-10-CM

## 2021-08-10 DIAGNOSIS — Z12.31 ENCOUNTER FOR SCREENING MAMMOGRAM FOR MALIGNANT NEOPLASM OF BREAST: ICD-10-CM

## 2021-08-10 DIAGNOSIS — E55.9 VITAMIN D DEFICIENCY DISEASE: ICD-10-CM

## 2021-08-10 DIAGNOSIS — E78.5 HYPERLIPIDEMIA ASSOCIATED WITH TYPE 2 DIABETES MELLITUS: Chronic | ICD-10-CM

## 2021-08-10 DIAGNOSIS — E11.42 DIABETIC PERIPHERAL NEUROPATHY ASSOCIATED WITH TYPE 2 DIABETES MELLITUS: ICD-10-CM

## 2021-08-10 PROCEDURE — 95251 PR GLUCOSE MONITOR, 72 HOUR, PHYS INTERP: ICD-10-PCS | Mod: ,,, | Performed by: NURSE PRACTITIONER

## 2021-08-10 PROCEDURE — 99214 PR OFFICE/OUTPT VISIT, EST, LEVL IV, 30-39 MIN: ICD-10-PCS | Mod: S$PBB,,, | Performed by: NURSE PRACTITIONER

## 2021-08-10 PROCEDURE — 99214 OFFICE O/P EST MOD 30 MIN: CPT | Mod: S$PBB,,, | Performed by: NURSE PRACTITIONER

## 2021-08-10 PROCEDURE — 99999 PR PBB SHADOW E&M-EST. PATIENT-LVL V: CPT | Mod: PBBFAC,,, | Performed by: NURSE PRACTITIONER

## 2021-08-10 PROCEDURE — 95251 CONT GLUC MNTR ANALYSIS I&R: CPT | Mod: ,,, | Performed by: NURSE PRACTITIONER

## 2021-08-10 PROCEDURE — 99215 OFFICE O/P EST HI 40 MIN: CPT | Mod: PBBFAC | Performed by: NURSE PRACTITIONER

## 2021-08-10 PROCEDURE — 99999 PR PBB SHADOW E&M-EST. PATIENT-LVL V: ICD-10-PCS | Mod: PBBFAC,,, | Performed by: NURSE PRACTITIONER

## 2021-08-10 RX ORDER — SUB-Q INSULIN DEVICE, 40 UNIT
EACH MISCELLANEOUS
Qty: 90 DEVICE | Refills: 3 | Status: SHIPPED | OUTPATIENT
Start: 2021-08-10 | End: 2021-08-16

## 2021-08-10 RX ORDER — ERGOCALCIFEROL 1.25 MG/1
50000 CAPSULE ORAL
Qty: 3 CAPSULE | Refills: 3 | Status: SHIPPED | OUTPATIENT
Start: 2021-08-10 | End: 2022-09-20

## 2021-08-10 RX ORDER — LEVOTHYROXINE SODIUM 25 UG/1
TABLET ORAL
Qty: 90 TABLET | Refills: 3 | Status: SHIPPED | OUTPATIENT
Start: 2021-08-10 | End: 2022-08-24 | Stop reason: SDUPTHER

## 2021-08-10 RX ORDER — LANCETS
EACH MISCELLANEOUS
Qty: 300 EACH | Refills: 3 | Status: SHIPPED | OUTPATIENT
Start: 2021-08-10

## 2021-08-10 RX ORDER — METFORMIN HYDROCHLORIDE 1000 MG/1
1000 TABLET ORAL 2 TIMES DAILY WITH MEALS
Qty: 180 TABLET | Refills: 3 | Status: SHIPPED | OUTPATIENT
Start: 2021-08-10 | End: 2022-05-16

## 2021-08-10 RX ORDER — DULAGLUTIDE 1.5 MG/.5ML
1.5 INJECTION, SOLUTION SUBCUTANEOUS
Qty: 12 PEN | Refills: 3 | Status: SHIPPED | OUTPATIENT
Start: 2021-08-10 | End: 2022-08-24 | Stop reason: SDUPTHER

## 2021-08-10 RX ORDER — INSULIN ASPART 100 [IU]/ML
INJECTION, SOLUTION INTRAVENOUS; SUBCUTANEOUS
Qty: 12 VIAL | Refills: 3 | Status: SHIPPED | OUTPATIENT
Start: 2021-08-10 | End: 2021-11-11 | Stop reason: SDUPTHER

## 2021-08-10 RX ORDER — ATORVASTATIN CALCIUM 20 MG/1
20 TABLET, FILM COATED ORAL DAILY
Qty: 90 TABLET | Refills: 3 | Status: SHIPPED | OUTPATIENT
Start: 2021-08-10 | End: 2022-10-05

## 2021-08-10 RX ORDER — LOSARTAN POTASSIUM AND HYDROCHLOROTHIAZIDE 12.5; 5 MG/1; MG/1
1 TABLET ORAL DAILY
Qty: 90 TABLET | Refills: 3 | Status: SHIPPED | OUTPATIENT
Start: 2021-08-10 | End: 2022-09-20

## 2021-08-10 RX ORDER — FLASH GLUCOSE SENSOR
KIT MISCELLANEOUS
Qty: 7 KIT | Refills: 3 | Status: SHIPPED | OUTPATIENT
Start: 2021-08-10 | End: 2022-08-24 | Stop reason: SDUPTHER

## 2021-08-16 RX ORDER — SUB-Q INSULIN DEVICE, 40 UNIT
EACH MISCELLANEOUS
Qty: 90 DEVICE | Refills: 3 | Status: SHIPPED | OUTPATIENT
Start: 2021-08-16 | End: 2021-08-16 | Stop reason: SDUPTHER

## 2021-08-16 RX ORDER — SUB-Q INSULIN DEVICE, 40 UNIT
EACH MISCELLANEOUS
Qty: 90 EACH | Refills: 3 | Status: SHIPPED | OUTPATIENT
Start: 2021-08-16 | End: 2022-08-24 | Stop reason: SDUPTHER

## 2021-08-16 RX ORDER — SUB-Q INSULIN DEVICE, 40 UNIT
EACH MISCELLANEOUS
Qty: 90 DEVICE | Refills: 3 | OUTPATIENT
Start: 2021-08-16

## 2021-08-23 ENCOUNTER — TELEPHONE (OUTPATIENT)
Dept: PHARMACY | Facility: CLINIC | Age: 71
End: 2021-08-23

## 2021-10-11 ENCOUNTER — HOSPITAL ENCOUNTER (OUTPATIENT)
Dept: RADIOLOGY | Facility: HOSPITAL | Age: 71
Discharge: HOME | End: 2021-10-11
Attending: INTERNAL MEDICINE
Payer: MEDICARE

## 2021-10-11 DIAGNOSIS — Z12.31 ENCOUNTER FOR SCREENING MAMMOGRAM FOR MALIGNANT NEOPLASM OF BREAST: ICD-10-CM

## 2021-10-11 PROCEDURE — 77067 MAMMO DIGITAL SCREENING BILAT WITH TOMO: ICD-10-PCS | Mod: 26,,, | Performed by: RADIOLOGY

## 2021-10-11 PROCEDURE — 77067 SCR MAMMO BI INCL CAD: CPT | Mod: 26,,, | Performed by: RADIOLOGY

## 2021-10-11 PROCEDURE — 77067 SCR MAMMO BI INCL CAD: CPT | Mod: TC

## 2021-10-11 PROCEDURE — 77063 MAMMO DIGITAL SCREENING BILAT WITH TOMO: ICD-10-PCS | Mod: 26,,, | Performed by: RADIOLOGY

## 2021-10-11 PROCEDURE — 77063 BREAST TOMOSYNTHESIS BI: CPT | Mod: 26,,, | Performed by: RADIOLOGY

## 2021-11-10 ENCOUNTER — PATIENT MESSAGE (OUTPATIENT)
Dept: INTERNAL MEDICINE | Facility: CLINIC | Age: 71
End: 2021-11-10
Payer: MEDICARE

## 2021-11-11 ENCOUNTER — OFFICE VISIT (OUTPATIENT)
Dept: INTERNAL MEDICINE | Facility: CLINIC | Age: 71
End: 2021-11-11
Payer: MEDICARE

## 2021-11-11 VITALS
HEART RATE: 89 BPM | HEIGHT: 64 IN | OXYGEN SATURATION: 97 % | WEIGHT: 173.5 LBS | SYSTOLIC BLOOD PRESSURE: 124 MMHG | DIASTOLIC BLOOD PRESSURE: 68 MMHG | BODY MASS INDEX: 29.62 KG/M2

## 2021-11-11 DIAGNOSIS — I15.2 HYPERTENSION ASSOCIATED WITH DIABETES: ICD-10-CM

## 2021-11-11 DIAGNOSIS — M85.80 OSTEOPENIA, UNSPECIFIED LOCATION: ICD-10-CM

## 2021-11-11 DIAGNOSIS — E55.9 VITAMIN D DEFICIENCY DISEASE: ICD-10-CM

## 2021-11-11 DIAGNOSIS — Z78.0 POST-MENOPAUSAL: ICD-10-CM

## 2021-11-11 DIAGNOSIS — Z00.00 LABORATORY EXAMINATION ORDERED AS PART OF A ROUTINE GENERAL MEDICAL EXAMINATION: ICD-10-CM

## 2021-11-11 DIAGNOSIS — E03.8 SUBCLINICAL HYPOTHYROIDISM: ICD-10-CM

## 2021-11-11 DIAGNOSIS — E11.42 TYPE 2 DIABETES MELLITUS WITH DIABETIC POLYNEUROPATHY, WITH LONG-TERM CURRENT USE OF INSULIN: ICD-10-CM

## 2021-11-11 DIAGNOSIS — E78.5 HYPERLIPIDEMIA ASSOCIATED WITH TYPE 2 DIABETES MELLITUS: ICD-10-CM

## 2021-11-11 DIAGNOSIS — J18.9 ATYPICAL PNEUMONIA: Primary | ICD-10-CM

## 2021-11-11 DIAGNOSIS — E11.59 HYPERTENSION ASSOCIATED WITH DIABETES: ICD-10-CM

## 2021-11-11 DIAGNOSIS — E11.69 HYPERLIPIDEMIA ASSOCIATED WITH TYPE 2 DIABETES MELLITUS: ICD-10-CM

## 2021-11-11 DIAGNOSIS — Z79.4 TYPE 2 DIABETES MELLITUS WITH DIABETIC POLYNEUROPATHY, WITH LONG-TERM CURRENT USE OF INSULIN: ICD-10-CM

## 2021-11-11 LAB
ALBUMIN/CREAT UR: 62.8 UG/MG (ref 0–30)
CREAT UR-MCNC: 113 MG/DL (ref 15–325)
MICROALBUMIN UR DL<=1MG/L-MCNC: 71 UG/ML

## 2021-11-11 PROCEDURE — 99215 OFFICE O/P EST HI 40 MIN: CPT | Mod: PBBFAC | Performed by: INTERNAL MEDICINE

## 2021-11-11 PROCEDURE — 99214 PR OFFICE/OUTPT VISIT, EST, LEVL IV, 30-39 MIN: ICD-10-PCS | Mod: S$PBB,,, | Performed by: INTERNAL MEDICINE

## 2021-11-11 PROCEDURE — 99999 PR PBB SHADOW E&M-EST. PATIENT-LVL V: CPT | Mod: PBBFAC,,, | Performed by: INTERNAL MEDICINE

## 2021-11-11 PROCEDURE — 82570 ASSAY OF URINE CREATININE: CPT | Performed by: INTERNAL MEDICINE

## 2021-11-11 PROCEDURE — 99214 OFFICE O/P EST MOD 30 MIN: CPT | Mod: S$PBB,,, | Performed by: INTERNAL MEDICINE

## 2021-11-11 PROCEDURE — 99999 PR PBB SHADOW E&M-EST. PATIENT-LVL V: ICD-10-PCS | Mod: PBBFAC,,, | Performed by: INTERNAL MEDICINE

## 2021-11-11 RX ORDER — INSULIN ASPART 100 [IU]/ML
INJECTION, SOLUTION INTRAVENOUS; SUBCUTANEOUS
Qty: 12 EACH | Refills: 3 | Status: CANCELLED | OUTPATIENT
Start: 2021-11-11

## 2021-11-11 RX ORDER — AZITHROMYCIN 250 MG/1
TABLET, FILM COATED ORAL
Qty: 6 TABLET | Refills: 0 | Status: SHIPPED | OUTPATIENT
Start: 2021-11-11 | End: 2021-11-16

## 2021-11-11 RX ORDER — INSULIN ASPART 100 [IU]/ML
INJECTION, SOLUTION INTRAVENOUS; SUBCUTANEOUS
Qty: 12 EACH | Refills: 3 | OUTPATIENT
Start: 2021-11-11 | End: 2021-12-06 | Stop reason: SDUPTHER

## 2021-11-12 ENCOUNTER — TELEPHONE (OUTPATIENT)
Dept: INTERNAL MEDICINE | Facility: CLINIC | Age: 71
End: 2021-11-12
Payer: MEDICARE

## 2021-11-15 ENCOUNTER — PATIENT MESSAGE (OUTPATIENT)
Dept: INTERNAL MEDICINE | Facility: CLINIC | Age: 71
End: 2021-11-15
Payer: MEDICARE

## 2021-12-06 RX ORDER — INSULIN ASPART 100 [IU]/ML
INJECTION, SOLUTION INTRAVENOUS; SUBCUTANEOUS
Qty: 120 ML | Refills: 3 | Status: SHIPPED | OUTPATIENT
Start: 2021-12-06 | End: 2022-08-24 | Stop reason: SDUPTHER

## 2022-01-11 ENCOUNTER — LAB VISIT (OUTPATIENT)
Dept: LAB | Facility: HOSPITAL | Age: 72
End: 2022-01-11
Payer: MEDICARE

## 2022-01-11 DIAGNOSIS — E11.69 HYPERLIPIDEMIA ASSOCIATED WITH TYPE 2 DIABETES MELLITUS: Chronic | ICD-10-CM

## 2022-01-11 DIAGNOSIS — E78.5 HYPERLIPIDEMIA ASSOCIATED WITH TYPE 2 DIABETES MELLITUS: Chronic | ICD-10-CM

## 2022-01-11 DIAGNOSIS — Z79.4 TYPE 2 DIABETES MELLITUS WITH DIABETIC POLYNEUROPATHY, WITH LONG-TERM CURRENT USE OF INSULIN: Chronic | ICD-10-CM

## 2022-01-11 DIAGNOSIS — E11.42 TYPE 2 DIABETES MELLITUS WITH DIABETIC POLYNEUROPATHY, WITH LONG-TERM CURRENT USE OF INSULIN: Chronic | ICD-10-CM

## 2022-01-11 LAB
CHOLEST SERPL-MCNC: 170 MG/DL (ref 120–199)
CHOLEST/HDLC SERPL: 3.1 {RATIO} (ref 2–5)
ESTIMATED AVG GLUCOSE: 137 MG/DL (ref 68–131)
HBA1C MFR BLD: 6.4 % (ref 4–5.6)
HDLC SERPL-MCNC: 55 MG/DL (ref 40–75)
HDLC SERPL: 32.4 % (ref 20–50)
LDLC SERPL CALC-MCNC: 71.6 MG/DL (ref 63–159)
NONHDLC SERPL-MCNC: 115 MG/DL
TRIGL SERPL-MCNC: 217 MG/DL (ref 30–150)

## 2022-01-11 PROCEDURE — 80061 LIPID PANEL: CPT | Performed by: NURSE PRACTITIONER

## 2022-01-11 PROCEDURE — 36415 COLL VENOUS BLD VENIPUNCTURE: CPT | Performed by: NURSE PRACTITIONER

## 2022-01-11 PROCEDURE — 83036 HEMOGLOBIN GLYCOSYLATED A1C: CPT | Performed by: NURSE PRACTITIONER
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
## 2022-01-13 ENCOUNTER — OFFICE VISIT (OUTPATIENT)
Dept: INTERNAL MEDICINE | Facility: CLINIC | Age: 72
End: 2022-01-13
Payer: MEDICARE

## 2022-01-13 VITALS
SYSTOLIC BLOOD PRESSURE: 132 MMHG | BODY MASS INDEX: 29.99 KG/M2 | OXYGEN SATURATION: 99 % | HEIGHT: 64 IN | DIASTOLIC BLOOD PRESSURE: 71 MMHG | HEART RATE: 87 BPM | WEIGHT: 175.69 LBS

## 2022-01-13 DIAGNOSIS — E11.69 HYPERLIPIDEMIA ASSOCIATED WITH TYPE 2 DIABETES MELLITUS: Chronic | ICD-10-CM

## 2022-01-13 DIAGNOSIS — E11.42 DIABETIC PERIPHERAL NEUROPATHY ASSOCIATED WITH TYPE 2 DIABETES MELLITUS: Chronic | ICD-10-CM

## 2022-01-13 DIAGNOSIS — I15.2 HYPERTENSION ASSOCIATED WITH DIABETES: Chronic | ICD-10-CM

## 2022-01-13 DIAGNOSIS — E03.9 HYPOTHYROIDISM, ACQUIRED: Chronic | ICD-10-CM

## 2022-01-13 DIAGNOSIS — E11.42 TYPE 2 DIABETES MELLITUS WITH DIABETIC POLYNEUROPATHY, WITH LONG-TERM CURRENT USE OF INSULIN: Primary | Chronic | ICD-10-CM

## 2022-01-13 DIAGNOSIS — E78.5 HYPERLIPIDEMIA ASSOCIATED WITH TYPE 2 DIABETES MELLITUS: Chronic | ICD-10-CM

## 2022-01-13 DIAGNOSIS — E11.59 HYPERTENSION ASSOCIATED WITH DIABETES: Chronic | ICD-10-CM

## 2022-01-13 DIAGNOSIS — Z79.4 TYPE 2 DIABETES MELLITUS WITH DIABETIC POLYNEUROPATHY, WITH LONG-TERM CURRENT USE OF INSULIN: Primary | Chronic | ICD-10-CM

## 2022-01-13 PROCEDURE — 95251 PR GLUCOSE MONITOR, 72 HOUR, PHYS INTERP: ICD-10-PCS | Mod: ,,, | Performed by: NURSE PRACTITIONER

## 2022-01-13 PROCEDURE — 99215 OFFICE O/P EST HI 40 MIN: CPT | Mod: PBBFAC | Performed by: NURSE PRACTITIONER

## 2022-01-13 PROCEDURE — 99999 PR PBB SHADOW E&M-EST. PATIENT-LVL V: CPT | Mod: PBBFAC,,, | Performed by: NURSE PRACTITIONER

## 2022-01-13 PROCEDURE — 99214 OFFICE O/P EST MOD 30 MIN: CPT | Mod: S$PBB,,, | Performed by: NURSE PRACTITIONER

## 2022-01-13 PROCEDURE — 99999 PR PBB SHADOW E&M-EST. PATIENT-LVL V: ICD-10-PCS | Mod: PBBFAC,,, | Performed by: NURSE PRACTITIONER

## 2022-01-13 PROCEDURE — 95251 CONT GLUC MNTR ANALYSIS I&R: CPT | Mod: ,,, | Performed by: NURSE PRACTITIONER

## 2022-01-13 PROCEDURE — 99214 PR OFFICE/OUTPT VISIT, EST, LEVL IV, 30-39 MIN: ICD-10-PCS | Mod: S$PBB,,, | Performed by: NURSE PRACTITIONER

## 2022-01-13 NOTE — PATIENT INSTRUCTIONS
A1c tsh prior (1-7 days before appt)  Follow up in 4-6 months   1 hour slot   vgo 30 1.67 u/hr   5 clicks (10 units) 5 mins before meal   6 clicks if sugar is > 180 or eating larger meal     a1c 6.4%   [email or web address]   [email or web address]  Eat fit fanny   myfitnesspal fanny

## 2022-01-31 ENCOUNTER — TELEPHONE (OUTPATIENT)
Dept: DERMATOLOGY | Facility: CLINIC | Age: 72
End: 2022-01-31
Payer: MEDICARE

## 2022-01-31 NOTE — TELEPHONE ENCOUNTER
----- Message from Ju Beaulieu MA sent at 1/28/2022  1:12 PM CST -----    ----- Message -----  From: Angeles Lenz  Sent: 1/28/2022  12:40 PM CST  To: [facility] Staff    Pt requesting call back re: scheduling annual visit.    .    Confirmed patient's contact below:  Contact Name:Linda Fong  Phone Number: [phone]

## 2022-05-04 ENCOUNTER — LAB VISIT (OUTPATIENT)
Dept: LAB | Facility: HOSPITAL | Age: 72
End: 2022-05-04
Payer: MEDICARE

## 2022-05-04 DIAGNOSIS — I15.2 HYPERTENSION ASSOCIATED WITH DIABETES: ICD-10-CM

## 2022-05-04 DIAGNOSIS — E11.59 HYPERTENSION ASSOCIATED WITH DIABETES: ICD-10-CM

## 2022-05-04 LAB
ALBUMIN SERPL BCP-MCNC: 3.7 G/DL (ref 3.5–5.2)
ALP SERPL-CCNC: 79 U/L (ref 55–135)
ALT SERPL W/O P-5'-P-CCNC: 15 U/L (ref 10–44)
ANION GAP SERPL CALC-SCNC: 7 MMOL/L (ref 8–16)
AST SERPL-CCNC: 19 U/L (ref 10–40)
BILIRUB SERPL-MCNC: 1.6 MG/DL (ref 0.1–1)
BUN SERPL-MCNC: 24 MG/DL (ref 8–23)
CALCIUM SERPL-MCNC: 9.8 MG/DL (ref 8.7–10.5)
CHLORIDE SERPL-SCNC: 102 MMOL/L (ref 95–110)
CO2 SERPL-SCNC: 31 MMOL/L (ref 23–29)
CREAT SERPL-MCNC: 0.8 MG/DL (ref 0.5–1.4)
EST. GFR  (AFRICAN AMERICAN): >60 ML/MIN/1.73 M^2
EST. GFR  (NON AFRICAN AMERICAN): >60 ML/MIN/1.73 M^2
GLUCOSE SERPL-MCNC: 93 MG/DL (ref 70–110)
POTASSIUM SERPL-SCNC: 4.3 MMOL/L (ref 3.5–5.1)
PROT SERPL-MCNC: 7.3 G/DL (ref 6–8.4)
SODIUM SERPL-SCNC: 140 MMOL/L (ref 136–145)

## 2022-05-04 PROCEDURE — 36415 COLL VENOUS BLD VENIPUNCTURE: CPT | Performed by: INTERNAL MEDICINE

## 2022-05-04 PROCEDURE — 80053 COMPREHEN METABOLIC PANEL: CPT | Performed by: INTERNAL MEDICINE
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
## 2022-05-16 ENCOUNTER — LAB VISIT (OUTPATIENT)
Dept: LAB | Facility: HOSPITAL | Age: 72
End: 2022-05-16
Payer: MEDICARE

## 2022-05-16 ENCOUNTER — OFFICE VISIT (OUTPATIENT)
Dept: INTERNAL MEDICINE | Facility: CLINIC | Age: 72
End: 2022-05-16
Payer: MEDICARE

## 2022-05-16 VITALS
OXYGEN SATURATION: 95 % | HEIGHT: 64 IN | BODY MASS INDEX: 29.81 KG/M2 | SYSTOLIC BLOOD PRESSURE: 124 MMHG | HEART RATE: 84 BPM | WEIGHT: 174.63 LBS | DIASTOLIC BLOOD PRESSURE: 75 MMHG

## 2022-05-16 DIAGNOSIS — E03.9 HYPOTHYROIDISM, ACQUIRED: Chronic | ICD-10-CM

## 2022-05-16 DIAGNOSIS — E11.42 TYPE 2 DIABETES MELLITUS WITH DIABETIC POLYNEUROPATHY, WITH LONG-TERM CURRENT USE OF INSULIN: Primary | Chronic | ICD-10-CM

## 2022-05-16 DIAGNOSIS — Z79.4 TYPE 2 DIABETES MELLITUS WITH DIABETIC POLYNEUROPATHY, WITH LONG-TERM CURRENT USE OF INSULIN: Primary | Chronic | ICD-10-CM

## 2022-05-16 DIAGNOSIS — E11.42 DIABETIC PERIPHERAL NEUROPATHY ASSOCIATED WITH TYPE 2 DIABETES MELLITUS: Chronic | ICD-10-CM

## 2022-05-16 DIAGNOSIS — I15.2 HYPERTENSION ASSOCIATED WITH DIABETES: Chronic | ICD-10-CM

## 2022-05-16 DIAGNOSIS — E11.69 HYPERLIPIDEMIA ASSOCIATED WITH TYPE 2 DIABETES MELLITUS: Chronic | ICD-10-CM

## 2022-05-16 DIAGNOSIS — E11.59 HYPERTENSION ASSOCIATED WITH DIABETES: Chronic | ICD-10-CM

## 2022-05-16 DIAGNOSIS — E78.5 HYPERLIPIDEMIA ASSOCIATED WITH TYPE 2 DIABETES MELLITUS: Chronic | ICD-10-CM

## 2022-05-16 LAB — TSH SERPL DL<=0.005 MIU/L-ACNC: 2.4 UIU/ML (ref 0.4–4)

## 2022-05-16 PROCEDURE — 99999 PR PBB SHADOW E&M-EST. PATIENT-LVL IV: CPT | Mod: PBBFAC,,, | Performed by: NURSE PRACTITIONER

## 2022-05-16 PROCEDURE — 95251 PR GLUCOSE MONITOR, 72 HOUR, PHYS INTERP: ICD-10-PCS | Mod: ,,, | Performed by: NURSE PRACTITIONER

## 2022-05-16 PROCEDURE — 99214 OFFICE O/P EST MOD 30 MIN: CPT | Mod: PBBFAC | Performed by: NURSE PRACTITIONER

## 2022-05-16 PROCEDURE — 95251 CONT GLUC MNTR ANALYSIS I&R: CPT | Mod: ,,, | Performed by: NURSE PRACTITIONER

## 2022-05-16 PROCEDURE — 99999 PR PBB SHADOW E&M-EST. PATIENT-LVL IV: ICD-10-PCS | Mod: PBBFAC,,, | Performed by: NURSE PRACTITIONER

## 2022-05-16 PROCEDURE — 36415 COLL VENOUS BLD VENIPUNCTURE: CPT | Performed by: NURSE PRACTITIONER

## 2022-05-16 PROCEDURE — 99214 OFFICE O/P EST MOD 30 MIN: CPT | Mod: S$PBB,,, | Performed by: NURSE PRACTITIONER

## 2022-05-16 PROCEDURE — 99214 PR OFFICE/OUTPT VISIT, EST, LEVL IV, 30-39 MIN: ICD-10-PCS | Mod: S$PBB,,, | Performed by: NURSE PRACTITIONER

## 2022-05-16 PROCEDURE — 84443 ASSAY THYROID STIM HORMONE: CPT | Performed by: NURSE PRACTITIONER

## 2022-05-16 RX ORDER — METFORMIN HYDROCHLORIDE 1000 MG/1
1000 TABLET ORAL
Qty: 90 TABLET | Refills: 3
Start: 2022-05-16 | End: 2022-08-22

## 2022-05-16 NOTE — PATIENT INSTRUCTIONS
A1c tsh today  A1c in 3 months  Follow up via audio in august w/ J Luis   Then 1 hour slot in Dec with   A1c -Dec 2022     Vgo 30  5 clicks  Trulicity 1.5 mg weekly   Metformin 1000 mg daily    Lab Results   Component Value Date    HGBA1C 6.4 (H) 01/11/2022     Goal less than 7%    J[facility] -[employer] [facility]  [email or web address]  Eat fit fanny  Myfitnesspal fanny  [email or web address]

## 2022-06-08 ENCOUNTER — OFFICE VISIT (OUTPATIENT)
Dept: DERMATOLOGY | Facility: CLINIC | Age: 72
End: 2022-06-08
Payer: MEDICARE

## 2022-06-08 DIAGNOSIS — L81.4 LENTIGO: Primary | ICD-10-CM

## 2022-06-08 DIAGNOSIS — L82.1 SK (SEBORRHEIC KERATOSIS): ICD-10-CM

## 2022-06-08 DIAGNOSIS — D22.9 NEVUS: ICD-10-CM

## 2022-06-08 DIAGNOSIS — D18.01 CHERRY ANGIOMA: ICD-10-CM

## 2022-06-08 PROCEDURE — 99213 OFFICE O/P EST LOW 20 MIN: CPT | Mod: PBBFAC,PO | Performed by: DERMATOLOGY

## 2022-06-08 PROCEDURE — 99213 OFFICE O/P EST LOW 20 MIN: CPT | Mod: S$PBB,,, | Performed by: DERMATOLOGY

## 2022-06-08 PROCEDURE — 99999 PR PBB SHADOW E&M-EST. PATIENT-LVL III: CPT | Mod: PBBFAC,,, | Performed by: DERMATOLOGY

## 2022-06-08 PROCEDURE — 99999 PR PBB SHADOW E&M-EST. PATIENT-LVL III: ICD-10-PCS | Mod: PBBFAC,,, | Performed by: DERMATOLOGY

## 2022-06-08 PROCEDURE — 99213 PR OFFICE/OUTPT VISIT, EST, LEVL III, 20-29 MIN: ICD-10-PCS | Mod: S$PBB,,, | Performed by: DERMATOLOGY

## 2022-06-08 NOTE — PROGRESS NOTES
"  Subjective:       Patient ID:  Linda Fong is a 72 y.o. female who presents for   Chief Complaint   Patient presents with    Skin Check     ubse     Patient is here today for a "UBSE" check.   Pt has a history of normal sun exposure in the past.   Pt recalls several blistering sunburns in the past- no  Pt has history of tanning bed use- no  Pt has had moles removed in the past- no  Pt has history of melanoma in first degree relatives- no      pt c/o lesion on right upper arm and left lower forearm.  Lesion on left arm had a white scale on it that has resolved. Not itching or bleeding. No tx.          Review of Systems   Skin: Negative for daily sunscreen use, activity-related sunscreen use and tendency to form keloidal scars.   Hematologic/Lymphatic: Does not bruise/bleed easily.        Objective:    Physical Exam   Constitutional: She appears well-developed and well-nourished. No distress.   Neurological: She is alert and oriented to person, place, and time. She is not disoriented.   Psychiatric: She has a normal mood and affect.   Skin:   Areas Examined (abnormalities noted in diagram):   Scalp / Hair Palpated and Inspected  Head / Face Inspection Performed  Neck Inspection Performed  Chest / Axilla Inspection Performed  Abdomen Inspection Performed  Back Inspection Performed  RUE Inspected  LUE Inspection Performed                   Diagram Legend     Erythematous scaling macule/papule c/w actinic keratosis       Vascular papule c/w angioma      Pigmented verrucoid papule/plaque c/w seborrheic keratosis      Yellow umbilicated papule c/w sebaceous hyperplasia      Irregularly shaped tan macule c/w lentigo     1-2 mm smooth white papules consistent with Milia      Movable subcutaneous cyst with punctum c/w epidermal inclusion cyst      Subcutaneous movable cyst c/w pilar cyst      Firm pink to brown papule c/w dermatofibroma      Pedunculated fleshy papule(s) c/w skin tag(s)      Evenly pigmented macule c/w " junctional nevus     Mildly variegated pigmented, slightly irregular-bordered macule c/w mildly atypical nevus      Flesh colored to evenly pigmented papule c/w intradermal nevus       Pink pearly papule/plaque c/w basal cell carcinoma      Erythematous hyperkeratotic cursted plaque c/w SCC      Surgical scar with no sign of skin cancer recurrence      Open and closed comedones      Inflammatory papules and pustules      Verrucoid papule consistent consistent with wart     Erythematous eczematous patches and plaques     Dystrophic onycholytic nail with subungual debris c/w onychomycosis     Umbilicated papule    Erythematous-base heme-crusted tan verrucoid plaque consistent with inflamed seborrheic keratosis     Erythematous Silvery Scaling Plaque c/w Psoriasis     See annotation      Assessment / Plan:        Lentigo  This is a benign hyperpigmented sun induced lesion. Recommend daily sun protection/avoidance and use of at least SPF 30, broad spectrum sunscreen (OTC drug) will reduce the number of new lesions. Treatment of these benign lesions are considered cosmetic.    The nature of sun-induced photo-aging and skin cancers is discussed.  Sun avoidance, protective clothing, and the use of 30-SPF sunscreens is advised. Observe closely for skin damage/changes, and call if such occurs.    Nevus  Discussed ABCDE's of nevi.  Monitor for new mole or moles that are becoming bigger, darker, irritated, or developing irregular borders. Brochure provided. Instructed patient to observe lesion(s) for changes and follow up in clinic if changes are noted. Patient to monitor skin at home for new or changing lesions.     Cherry angioma  These are benign vascular lesions that are inherited.  Treatment is not necessary.    SK (seborrheic keratosis)  These are benign inherited growths without a malignant potential. Reassurance given to patient. No treatment is necessary.     Upper body skin examination performed today including at  least 6 points as noted in physical examination. No lesions suspicious for malignancy noted.    Recommend daily sun protection/avoidance and use of at least SPF 30, broad spectrum sunscreen (OTC drug).              Follow up in about 1 year (around 6/8/2023) for UBSE.

## 2022-07-11 ENCOUNTER — OFFICE VISIT (OUTPATIENT)
Dept: INTERNAL MEDICINE | Facility: CLINIC | Age: 72
End: 2022-07-11
Payer: MEDICARE

## 2022-07-11 ENCOUNTER — TELEPHONE (OUTPATIENT)
Dept: INTERNAL MEDICINE | Facility: CLINIC | Age: 72
End: 2022-07-11
Payer: MEDICARE

## 2022-07-11 ENCOUNTER — HOSPITAL ENCOUNTER (OUTPATIENT)
Dept: RADIOLOGY | Facility: HOSPITAL | Age: 72
Discharge: HOME | End: 2022-07-11
Attending: INTERNAL MEDICINE
Payer: MEDICARE

## 2022-07-11 VITALS
OXYGEN SATURATION: 97 % | HEART RATE: 88 BPM | DIASTOLIC BLOOD PRESSURE: 77 MMHG | SYSTOLIC BLOOD PRESSURE: 134 MMHG | WEIGHT: 171.94 LBS | BODY MASS INDEX: 29.35 KG/M2 | HEIGHT: 64 IN

## 2022-07-11 DIAGNOSIS — E03.8 SUBCLINICAL HYPOTHYROIDISM: ICD-10-CM

## 2022-07-11 DIAGNOSIS — H90.3 SENSORINEURAL HEARING LOSS (SNHL) OF BOTH EARS: ICD-10-CM

## 2022-07-11 DIAGNOSIS — M25.562 ACUTE PAIN OF LEFT KNEE: Primary | ICD-10-CM

## 2022-07-11 DIAGNOSIS — Z79.4 TYPE 2 DIABETES MELLITUS WITH DIABETIC POLYNEUROPATHY, WITH LONG-TERM CURRENT USE OF INSULIN: ICD-10-CM

## 2022-07-11 DIAGNOSIS — E11.59 HYPERTENSION ASSOCIATED WITH DIABETES: ICD-10-CM

## 2022-07-11 DIAGNOSIS — E11.69 HYPERLIPIDEMIA ASSOCIATED WITH TYPE 2 DIABETES MELLITUS: ICD-10-CM

## 2022-07-11 DIAGNOSIS — E78.5 HYPERLIPIDEMIA ASSOCIATED WITH TYPE 2 DIABETES MELLITUS: ICD-10-CM

## 2022-07-11 DIAGNOSIS — E11.42 TYPE 2 DIABETES MELLITUS WITH DIABETIC POLYNEUROPATHY, WITH LONG-TERM CURRENT USE OF INSULIN: ICD-10-CM

## 2022-07-11 DIAGNOSIS — E55.9 VITAMIN D DEFICIENCY: ICD-10-CM

## 2022-07-11 DIAGNOSIS — M25.562 ACUTE PAIN OF LEFT KNEE: ICD-10-CM

## 2022-07-11 DIAGNOSIS — I15.2 HYPERTENSION ASSOCIATED WITH DIABETES: ICD-10-CM

## 2022-07-11 PROCEDURE — 99214 PR OFFICE/OUTPT VISIT, EST, LEVL IV, 30-39 MIN: ICD-10-PCS | Mod: S$PBB,,, | Performed by: INTERNAL MEDICINE

## 2022-07-11 PROCEDURE — 99999 PR PBB SHADOW E&M-EST. PATIENT-LVL V: ICD-10-PCS | Mod: PBBFAC,,, | Performed by: INTERNAL MEDICINE

## 2022-07-11 PROCEDURE — 73560 X-RAY EXAM OF KNEE 1 OR 2: CPT | Mod: 26,RT,, | Performed by: RADIOLOGY

## 2022-07-11 PROCEDURE — 73560 XR KNEE ORTHO LEFT: ICD-10-PCS | Mod: 26,RT,, | Performed by: RADIOLOGY

## 2022-07-11 PROCEDURE — 73562 X-RAY EXAM OF KNEE 3: CPT | Mod: 26,LT,, | Performed by: RADIOLOGY

## 2022-07-11 PROCEDURE — 73560 X-RAY EXAM OF KNEE 1 OR 2: CPT | Mod: TC,RT

## 2022-07-11 PROCEDURE — 99215 OFFICE O/P EST HI 40 MIN: CPT | Mod: PBBFAC | Performed by: INTERNAL MEDICINE

## 2022-07-11 PROCEDURE — 73562 X-RAY EXAM OF KNEE 3: CPT | Mod: TC,LT

## 2022-07-11 PROCEDURE — 99214 OFFICE O/P EST MOD 30 MIN: CPT | Mod: S$PBB,,, | Performed by: INTERNAL MEDICINE

## 2022-07-11 PROCEDURE — 73562 XR KNEE ORTHO LEFT: ICD-10-PCS | Mod: 26,LT,, | Performed by: RADIOLOGY

## 2022-07-11 PROCEDURE — 99999 PR PBB SHADOW E&M-EST. PATIENT-LVL V: CPT | Mod: PBBFAC,,, | Performed by: INTERNAL MEDICINE

## 2022-07-11 NOTE — PROGRESS NOTES
"Subjective:       Patient ID: Linda Fong is a 72 y.o. female.    Chief Complaint: No chief complaint on file.      HPI  Linda Fong is a 72 y.o. year old female with     Left knee pain, on going for several weeks, initially started after she "came down on it". Did not hear a pop for feel a pop. There was pain immediately after she twisted it. Had been using voltaren gel and took advils, elevates when she can. Had been resolving, but then she kneeled on her L knee. Reports mild swelling to medial aspect of knee. Denies any falls.    Review of Systems   Constitutional: Negative for activity change, appetite change, fatigue, fever and unexpected weight change.   HENT: Negative for congestion, hearing loss, postnasal drip, sneezing, sore throat, trouble swallowing and voice change.    Eyes: Negative for pain and discharge.   Respiratory: Negative for cough, choking, chest tightness, shortness of breath and wheezing.    Cardiovascular: Negative for chest pain, palpitations and leg swelling.   Gastrointestinal: Negative for abdominal distention, abdominal pain, blood in stool, constipation, diarrhea, nausea and vomiting.   Endocrine: Negative for polydipsia and polyuria.   Genitourinary: Negative for difficulty urinating and flank pain.   Musculoskeletal: Positive for arthralgias and joint swelling. Negative for back pain, myalgias and neck pain.   Skin: Negative for rash.   Neurological: Negative for dizziness, tremors, seizures, weakness, numbness and headaches.   Psychiatric/Behavioral: Negative for agitation. The patient is not nervous/anxious.          Past Medical History:   Diagnosis Date    Altered mental status 1/5/2019    CVA (cerebral vascular accident) 1/5/2019    Diabetic nephropathy     DJD (degenerative joint disease)     Goiter     HTN (hypertension)     Hyperlipidemia     Intracranial bleeding     12/10    Osteopenia     PN (peripheral neuropathy)     Spell of altered cognition 5/19/2014 "    TIA (transient ischemic attack)     Traumatic brain injury Dec 2010    Type II or unspecified type diabetes mellitus with renal manifestations, uncontrolled(250.42)         Prior to Admission medications    Medication Sig Start Date End Date Taking? Authorizing Provider   ascorbic acid, vitamin C, (VITAMIN C) 100 MG tablet Take 100 mg by mouth once daily.    Historical Provider   aspirin (ECOTRIN) 81 MG EC tablet Take 81 mg by mouth once daily.    Historical Provider   atorvastatin (LIPITOR) 20 MG tablet Take 1 tablet (20 mg total) by mouth once daily. 8/10/21   LOVE Dailey FNP   biotin 1 mg Cap Take 1 capsule by mouth once daily.    Historical Provider   blood sugar diagnostic Strp To check BG 3  times daily, to use with insurance preferred meter, e 11.65 8/10/21   LOVE Dailey FNP   blood-glucose meter kit To check BG 3  times daily, to use with insurance preferred meter, e 11.65 12/16/20 12/16/21  LOVE Dailey FNP   dulaglutide (TRULICITY) 1.5 mg/0.5 mL pen injector Inject 1.5 mg into the skin every 7 days. 8/10/21   LOVE Dailey FNP   ergocalciferol (ERGOCALCIFEROL) 50,000 unit Cap Take 1 capsule (50,000 Units total) by mouth every 30 days. 8/10/21   LOVE Dailey FNP   flash glucose sensor (FREESTYLE SIDNEY 14 DAY SENSOR) Kit Change every 14 days 8/10/21   LOVE Dailey FNP   insulin aspart U-100 (NOVOLOG U-100 INSULIN ASPART) 100 unit/mL injection Uses vgo 40, max daily 130 units, 12 vials per 90 days, 6 clicks with meals, correction scale 180-230+2, 231-280+4, 281-330+6, 331-380+8, >380+10. 12/6/21   LOVE Dailey FNP   lancets Misc To check BG 3  times daily, to use with insurance preferred meter, e 11.65 8/10/21   LOVE Dailey FNP   levothyroxine (SYNTHROID) 25 MCG tablet TAKE 1 tablet daily. 8/10/21   LOVE Dailey FNP   losartan-hydrochlorothiazide 50-12.5 mg (HYZAAR) 50-12.5 mg per tablet Take 1 tablet by  "mouth once daily. 8/10/21   LOVE Dailey FNP   metFORMIN (GLUCOPHAGE) 1000 MG tablet Take 1 tablet (1,000 mg total) by mouth daily with breakfast. 5/16/22   LOVE Dailey FNP   multivitamin capsule Take 1 capsule by mouth once daily.    Historical Provider   naproxen (NAPROSYN) 500 MG tablet Take 1 tablet (500 mg total) by mouth 2 (two) times daily with meals. 4/25/22   Helio Armenta,    omega-3s/dha/epa/fish oil/D3 (VITAMIN-D + OMEGA-3 ORAL) Take by mouth.    Historical Provider   sub-q insulin device, 30 unit (V-GO 30) Mariel Use daily.  Patient taking differently: Use daily. 8/16/21   LOVE Dailey FNP   zinc gluconate 50 mg tablet Take 50 mg by mouth once daily.    Historical Provider        Past medical history, surgical history, and family medical history reviewed and updated as appropriate.    Medications and allergies reviewed.     Objective:          Vitals:    07/11/22 0904   BP: 134/77   Pulse: 88   SpO2: 97%   Weight: 78 kg (171 lb 15.3 oz)   Height: 5' 4" (1.626 m)     Body mass index is 29.52 kg/m².  Physical Exam  Constitutional:       Appearance: She is well-developed.   HENT:      Head: Normocephalic and atraumatic.   Cardiovascular:      Rate and Rhythm: Normal rate and regular rhythm.      Heart sounds: Normal heart sounds.   Pulmonary:      Effort: Pulmonary effort is normal. No respiratory distress.      Breath sounds: Normal breath sounds. No wheezing.   Abdominal:      General: Bowel sounds are normal. There is no distension.      Palpations: Abdomen is soft.      Tenderness: There is no abdominal tenderness.   Musculoskeletal:         General: No tenderness. Normal range of motion.      Cervical back: Normal range of motion.      Comments: Mild effusion to L medial knee  No joint laxity  Negative anterior and posterior drawyer  Negative varus/valgus testing  Able to stand from sitting position and bear weight  Has knee brace     Skin:     General: Skin is " warm and dry.   Neurological:      Mental Status: She is alert and oriented to person, place, and time.      Cranial Nerves: No cranial nerve deficit.      Deep Tendon Reflexes: Reflexes are normal and symmetric.          Protective Sensation (w/ 10 gram monofilament):  Right: Intact  Left: Intact    Visual Inspection:  Normal -  Bilateral    Pedal Pulses:   Right: Present  Left: Present    Posterior tibialis:   Right:Present  Left: Present        Lab Results   Component Value Date    WBC 9.20 01/04/2019    HGB 13.3 01/04/2019    HCT 39.9 01/04/2019     01/04/2019    CHOL 170 01/11/2022    TRIG 217 (H) 01/11/2022    HDL 55 01/11/2022    ALT 15 05/04/2022    AST 19 05/04/2022     05/04/2022    K 4.3 05/04/2022     05/04/2022    CREATININE 0.8 05/04/2022    BUN 24 (H) 05/04/2022    CO2 31 (H) 05/04/2022    TSH 2.403 05/16/2022    INR 1.0 01/05/2019    HGBA1C 6.4 (H) 01/11/2022       Assessment:       1. Acute pain of left knee    2. Subclinical hypothyroidism    3. Hypertension associated with diabetes    4. Hyperlipidemia associated with type 2 diabetes mellitus    5. Type 2 diabetes mellitus with diabetic polyneuropathy, with long-term current use of insulin    6. Vitamin D deficiency          Plan:     Diagnoses and all orders for this visit:    Acute pain of left knee  -     Ambulatory referral/consult to Orthopedics; Future  -     X-ray Knee Ortho Left; Future    Subclinical hypothyroidism    Hypertension associated with diabetes  -     CBC Auto Differential; Future    Hyperlipidemia associated with type 2 diabetes mellitus    Type 2 diabetes mellitus with diabetic polyneuropathy, with long-term current use of insulin  -     Ambulatory referral/consult to Optometry; Future    Vitamin D deficiency  -     Vitamin D; Future    Sensorineural hearing loss (SNHL) of both ears  -     Ambulatory referral/consult to Audiology; Future    L knee pain - x-ray today, referral to orthopedics for evaluation  of recurrent knee injuries.   HTN - controlled, no changes to management  HLD - controlled, no changes to management  t2DM - controlled, no changes to management, foot exam done today. Due for eye exam  Vit D deficiency - check levels  B/l hearing loss - referral to audiology for testing.    Health maintenance reviewed with patient. Patient declines immunizations.    Follow up in about 1 month (around 8/11/2022).    Rob Phillips MD  Internal Medicine / Primary Care  [facility]  7/11/2022

## 2022-07-11 NOTE — TELEPHONE ENCOUNTER
lft msg to come at the same time as her , the appt are only 30 mins apart and no pt is in between them

## 2022-07-11 NOTE — PATIENT INSTRUCTIONS
Tylenol for pain relief  X-ray of left knee  Schedule orthopedic surgery appointment for evaluation    Schedule optometry appointment for diabetic eye exam    Return to clinic in 4 months or sooner if needed.

## 2022-07-11 NOTE — TELEPHONE ENCOUNTER
----- Message from Anika Carpenter sent at 7/11/2022 11:02 AM CDT -----  Pt wanted to schedule her follow up visit  to match  's follow-up visit in January.        Thanks

## 2022-08-16 ENCOUNTER — CLINICAL SUPPORT (OUTPATIENT)
Dept: AUDIOLOGY | Facility: CLINIC | Age: 72
End: 2022-08-16
Payer: MEDICARE

## 2022-08-16 ENCOUNTER — LAB VISIT (OUTPATIENT)
Dept: LAB | Facility: HOSPITAL | Age: 72
End: 2022-08-16
Attending: INTERNAL MEDICINE
Payer: MEDICARE

## 2022-08-16 DIAGNOSIS — H90.3 SENSORINEURAL HEARING LOSS (SNHL) OF BOTH EARS: ICD-10-CM

## 2022-08-16 DIAGNOSIS — E55.9 VITAMIN D DEFICIENCY: ICD-10-CM

## 2022-08-16 DIAGNOSIS — H90.A22 SENSORINEURAL HEARING LOSS (SNHL) OF LEFT EAR WITH RESTRICTED HEARING OF RIGHT EAR: Primary | ICD-10-CM

## 2022-08-16 DIAGNOSIS — E11.59 HYPERTENSION ASSOCIATED WITH DIABETES: ICD-10-CM

## 2022-08-16 DIAGNOSIS — I15.2 HYPERTENSION ASSOCIATED WITH DIABETES: ICD-10-CM

## 2022-08-16 LAB
25(OH)D3+25(OH)D2 SERPL-MCNC: 42 NG/ML (ref 30–96)
BASOPHILS # BLD AUTO: 0.05 K/UL (ref 0–0.2)
BASOPHILS NFR BLD: 0.6 % (ref 0–1.9)
DIFFERENTIAL METHOD: NORMAL
EOSINOPHIL # BLD AUTO: 0.3 K/UL (ref 0–0.5)
EOSINOPHIL NFR BLD: 3.1 % (ref 0–8)
ERYTHROCYTE [DISTWIDTH] IN BLOOD BY AUTOMATED COUNT: 13.8 % (ref 11.5–14.5)
HCT VFR BLD AUTO: 38.6 % (ref 37–48.5)
HGB BLD-MCNC: 12.9 G/DL (ref 12–16)
IMM GRANULOCYTES # BLD AUTO: 0.03 K/UL (ref 0–0.04)
IMM GRANULOCYTES NFR BLD AUTO: 0.3 % (ref 0–0.5)
LYMPHOCYTES # BLD AUTO: 2.6 K/UL (ref 1–4.8)
LYMPHOCYTES NFR BLD: 29.1 % (ref 18–48)
MCH RBC QN AUTO: 29.7 PG (ref 27–31)
MCHC RBC AUTO-ENTMCNC: 33.4 G/DL (ref 32–36)
MCV RBC AUTO: 89 FL (ref 82–98)
MONOCYTES # BLD AUTO: 0.6 K/UL (ref 0.3–1)
MONOCYTES NFR BLD: 6.8 % (ref 4–15)
NEUTROPHILS # BLD AUTO: 5.3 K/UL (ref 1.8–7.7)
NEUTROPHILS NFR BLD: 60.1 % (ref 38–73)
NRBC BLD-RTO: 0 /100 WBC
PLATELET # BLD AUTO: 261 K/UL (ref 150–450)
PMV BLD AUTO: 10 FL (ref 9.2–12.9)
RBC # BLD AUTO: 4.34 M/UL (ref 4–5.4)
WBC # BLD AUTO: 8.79 K/UL (ref 3.9–12.7)

## 2022-08-16 PROCEDURE — 99999 PR PBB SHADOW E&M-EST. PATIENT-LVL I: CPT | Mod: PBBFAC,,, | Performed by: AUDIOLOGIST

## 2022-08-16 PROCEDURE — 99999 PR PBB SHADOW E&M-EST. PATIENT-LVL I: ICD-10-PCS | Mod: PBBFAC,,, | Performed by: AUDIOLOGIST

## 2022-08-16 PROCEDURE — 92567 TYMPANOMETRY: CPT | Mod: PBBFAC | Performed by: AUDIOLOGIST

## 2022-08-16 PROCEDURE — 99211 OFF/OP EST MAY X REQ PHY/QHP: CPT | Mod: PBBFAC | Performed by: AUDIOLOGIST

## 2022-08-16 PROCEDURE — 85025 COMPLETE CBC W/AUTO DIFF WBC: CPT | Performed by: INTERNAL MEDICINE

## 2022-08-16 PROCEDURE — 92557 COMPREHENSIVE HEARING TEST: CPT | Mod: PBBFAC | Performed by: AUDIOLOGIST

## 2022-08-16 PROCEDURE — 82306 VITAMIN D 25 HYDROXY: CPT | Performed by: INTERNAL MEDICINE

## 2022-08-16 NOTE — PROGRESS NOTES
Linda Fong was seen today in the clinic for an audiologic evaluation.  Mrs. Fong reported a history of diabetes that is medically managed.  She denied any hearing loss, tinnitus, otalgia, or aural pressure.  She reported a history of working around machine noise without hearing protection.    Tympanometry revealed Type A in the right ear and Type A in the left ear.     Audiogram results revealed essentially normal hearing with a mild hearing loss at 8000 Hz in the right ear and normal hearing sloping to a mild to moderate SNHL 4157-4575 Hz in the left ear.      Speech reception thresholds were noted at 20 dB in the right ear and 10 dB in the left ear.    Speech discrimination scores were 96% in the right ear and 100% in the left ear.    Recommendations:  1. Otologic evaluation  2. Annual audiogram  3. Hearing protection when in noise

## 2022-08-17 ENCOUNTER — PATIENT MESSAGE (OUTPATIENT)
Dept: INTERNAL MEDICINE | Facility: CLINIC | Age: 72
End: 2022-08-17
Payer: MEDICARE

## 2022-08-17 ENCOUNTER — TELEPHONE (OUTPATIENT)
Dept: INTERNAL MEDICINE | Facility: CLINIC | Age: 72
End: 2022-08-17
Payer: MEDICARE

## 2022-08-17 NOTE — TELEPHONE ENCOUNTER
Called patient and informed blood counts and vitamin D level are normal, follow up as scheduled. Patient verbalized understanding.

## 2022-08-23 NOTE — PROGRESS NOTES
Established Patient - Audio Only Telehealth Visit     The patient location is: home   The chief complaint leading to consultation is:  Type 2 dm   Visit type: Virtual visit with audio only (telephone)  Total time spent with patient: 15 mins        The reason for the audio only service rather than synchronous audio and video virtual visit was related to technical difficulties or patient preference/necessity.     Each patient to whom I provide medical services by telemedicine is:  (1) informed of the relationship between the physician and patient and the respective role of any other health care provider with respect to management of the patient; and (2) notified that they may decline to receive medical services by telemedicine and may withdraw from such care at any time. Patient verbally consented to receive this service via voice-only telephone call.       HPI: Type 2 DM     Highest 250s mg/dl   Lowest 56 mg/dl, not happening often     Reviewed labs  Cbc normal spring 2022   tsh normal   On lt4   C/o fatigue at times    Current meds: alexsander 1 use, vgo 30 , trulicity 1.5 mg weekly, metformin 1000 mg twice a day, novolog insulin vials    On boluses 3x a day, uses pump  Testing 4 x a day  Patient is willing and able to use the device  Demonstrated an understanding of the technology and is motivated to use CGM  Patient expected to adhere to a comprehensive diabetes treatment plan and patient has adequate medical supervision  Patient experiences multiple impaired awareness of hypoglycemia (hypoglycemia unawareness)    Assessment and plan:    1. Type 2 diabetes mellitus with diabetic polyneuropathy, with long-term current use of insulin  Hemoglobin A1C    dulaglutide (TRULICITY) 1.5 mg/0.5 mL pen injector   2. Hypothyroidism, acquired     3. Hypertension associated with diabetes     4. Hyperlipidemia associated with type 2 diabetes mellitus       1-4. Follow up in Dec, labs booked already  Send rx for all antihyperglycemic  agents  Using patch for alexsander 1   [facility] for vgo   [facility] for insulins, alexsander 1   Discussed dietary habits, stressors  Lab Results   Component Value Date    LDLCALC 71.6 01/11/2022     At goal        This service was not originating from a related E/M service provided within the previous 7 days nor will  to an E/M service or procedure within the next 24 hours or my soonest available appointment.  Prevailing standard of care was able to be met in this audio-only visit.

## 2022-08-24 ENCOUNTER — OFFICE VISIT (OUTPATIENT)
Dept: INTERNAL MEDICINE | Facility: CLINIC | Age: 72
End: 2022-08-24
Payer: MEDICARE

## 2022-08-24 DIAGNOSIS — I15.2 HYPERTENSION ASSOCIATED WITH DIABETES: Chronic | ICD-10-CM

## 2022-08-24 DIAGNOSIS — E11.42 TYPE 2 DIABETES MELLITUS WITH DIABETIC POLYNEUROPATHY, WITH LONG-TERM CURRENT USE OF INSULIN: Primary | Chronic | ICD-10-CM

## 2022-08-24 DIAGNOSIS — E11.59 HYPERTENSION ASSOCIATED WITH DIABETES: Chronic | ICD-10-CM

## 2022-08-24 DIAGNOSIS — E03.9 HYPOTHYROIDISM, ACQUIRED: Chronic | ICD-10-CM

## 2022-08-24 DIAGNOSIS — E11.69 HYPERLIPIDEMIA ASSOCIATED WITH TYPE 2 DIABETES MELLITUS: Chronic | ICD-10-CM

## 2022-08-24 DIAGNOSIS — E78.5 HYPERLIPIDEMIA ASSOCIATED WITH TYPE 2 DIABETES MELLITUS: Chronic | ICD-10-CM

## 2022-08-24 DIAGNOSIS — Z79.4 TYPE 2 DIABETES MELLITUS WITH DIABETIC POLYNEUROPATHY, WITH LONG-TERM CURRENT USE OF INSULIN: Primary | Chronic | ICD-10-CM

## 2022-08-24 PROCEDURE — 99442 PR PHYSICIAN TELEPHONE EVALUATION 11-20 MIN: ICD-10-PCS | Mod: 95,,, | Performed by: NURSE PRACTITIONER

## 2022-08-24 PROCEDURE — 99442 PR PHYSICIAN TELEPHONE EVALUATION 11-20 MIN: CPT | Mod: 95,,, | Performed by: NURSE PRACTITIONER

## 2022-08-24 RX ORDER — FLASH GLUCOSE SENSOR
KIT MISCELLANEOUS
Qty: 7 KIT | Refills: 3 | Status: SHIPPED | OUTPATIENT
Start: 2022-08-24 | End: 2022-12-19 | Stop reason: SDUPTHER

## 2022-08-24 RX ORDER — INSULIN PUMP SYRINGE, 3 ML
EACH MISCELLANEOUS
Qty: 1 EACH | Refills: 0 | Status: SHIPPED | OUTPATIENT
Start: 2022-08-24 | End: 2024-01-31

## 2022-08-24 RX ORDER — LEVOTHYROXINE SODIUM 25 UG/1
TABLET ORAL
Qty: 90 TABLET | Refills: 3 | Status: SHIPPED | OUTPATIENT
Start: 2022-08-24 | End: 2022-08-27 | Stop reason: SDUPTHER

## 2022-08-24 RX ORDER — DULAGLUTIDE 1.5 MG/.5ML
1.5 INJECTION, SOLUTION SUBCUTANEOUS
Qty: 12 PEN | Refills: 3 | Status: SHIPPED | OUTPATIENT
Start: 2022-08-24 | End: 2022-09-19

## 2022-08-24 RX ORDER — SUB-Q INSULIN DEVICE, 40 UNIT
EACH MISCELLANEOUS
Qty: 90 EACH | Refills: 3 | Status: SHIPPED | OUTPATIENT
Start: 2022-08-24 | End: 2022-12-19 | Stop reason: SDUPTHER

## 2022-08-24 RX ORDER — INSULIN ASPART 100 [IU]/ML
INJECTION, SOLUTION INTRAVENOUS; SUBCUTANEOUS
Qty: 120 ML | Refills: 3 | Status: SHIPPED | OUTPATIENT
Start: 2022-08-24 | End: 2023-05-01 | Stop reason: SDUPTHER

## 2022-12-14 ENCOUNTER — LAB VISIT (OUTPATIENT)
Dept: LAB | Facility: HOSPITAL | Age: 72
End: 2022-12-14
Payer: MEDICARE

## 2022-12-14 DIAGNOSIS — E11.42 TYPE 2 DIABETES MELLITUS WITH DIABETIC POLYNEUROPATHY, WITH LONG-TERM CURRENT USE OF INSULIN: Chronic | ICD-10-CM

## 2022-12-14 DIAGNOSIS — Z79.4 TYPE 2 DIABETES MELLITUS WITH DIABETIC POLYNEUROPATHY, WITH LONG-TERM CURRENT USE OF INSULIN: Chronic | ICD-10-CM

## 2022-12-14 LAB
ESTIMATED AVG GLUCOSE: 148 MG/DL (ref 68–131)
HBA1C MFR BLD: 6.8 % (ref 4–5.6)

## 2022-12-14 PROCEDURE — 83036 HEMOGLOBIN GLYCOSYLATED A1C: CPT | Performed by: NURSE PRACTITIONER

## 2022-12-14 PROCEDURE — 36415 COLL VENOUS BLD VENIPUNCTURE: CPT | Performed by: NURSE PRACTITIONER
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
## 2022-12-19 ENCOUNTER — OFFICE VISIT (OUTPATIENT)
Dept: INTERNAL MEDICINE | Facility: CLINIC | Age: 72
End: 2022-12-19
Payer: MEDICARE

## 2022-12-19 VITALS
SYSTOLIC BLOOD PRESSURE: 138 MMHG | WEIGHT: 174.19 LBS | BODY MASS INDEX: 29.74 KG/M2 | OXYGEN SATURATION: 97 % | DIASTOLIC BLOOD PRESSURE: 74 MMHG | HEIGHT: 64 IN | HEART RATE: 75 BPM

## 2022-12-19 DIAGNOSIS — Z12.31 ENCOUNTER FOR SCREENING MAMMOGRAM FOR MALIGNANT NEOPLASM OF BREAST: ICD-10-CM

## 2022-12-19 DIAGNOSIS — I15.2 HYPERTENSION ASSOCIATED WITH DIABETES: Chronic | ICD-10-CM

## 2022-12-19 DIAGNOSIS — E03.9 HYPOTHYROIDISM, ACQUIRED: Chronic | ICD-10-CM

## 2022-12-19 DIAGNOSIS — E11.42 TYPE 2 DIABETES MELLITUS WITH DIABETIC POLYNEUROPATHY, WITH LONG-TERM CURRENT USE OF INSULIN: Primary | Chronic | ICD-10-CM

## 2022-12-19 DIAGNOSIS — E11.69 HYPERLIPIDEMIA ASSOCIATED WITH TYPE 2 DIABETES MELLITUS: Chronic | ICD-10-CM

## 2022-12-19 DIAGNOSIS — Z78.0 POSTMENOPAUSAL STATUS: ICD-10-CM

## 2022-12-19 DIAGNOSIS — E78.5 HYPERLIPIDEMIA ASSOCIATED WITH TYPE 2 DIABETES MELLITUS: Chronic | ICD-10-CM

## 2022-12-19 DIAGNOSIS — E11.59 HYPERTENSION ASSOCIATED WITH DIABETES: Chronic | ICD-10-CM

## 2022-12-19 DIAGNOSIS — Z79.4 TYPE 2 DIABETES MELLITUS WITH DIABETIC POLYNEUROPATHY, WITH LONG-TERM CURRENT USE OF INSULIN: Primary | Chronic | ICD-10-CM

## 2022-12-19 PROCEDURE — 95251 PR GLUCOSE MONITOR, 72 HOUR, PHYS INTERP: ICD-10-PCS | Mod: ,,, | Performed by: NURSE PRACTITIONER

## 2022-12-19 PROCEDURE — 99215 OFFICE O/P EST HI 40 MIN: CPT | Mod: PBBFAC | Performed by: NURSE PRACTITIONER

## 2022-12-19 PROCEDURE — 95251 CONT GLUC MNTR ANALYSIS I&R: CPT | Mod: ,,, | Performed by: NURSE PRACTITIONER

## 2022-12-19 PROCEDURE — 99214 OFFICE O/P EST MOD 30 MIN: CPT | Mod: S$PBB,,, | Performed by: NURSE PRACTITIONER

## 2022-12-19 PROCEDURE — 99999 PR PBB SHADOW E&M-EST. PATIENT-LVL V: ICD-10-PCS | Mod: PBBFAC,,, | Performed by: NURSE PRACTITIONER

## 2022-12-19 PROCEDURE — 99214 PR OFFICE/OUTPT VISIT, EST, LEVL IV, 30-39 MIN: ICD-10-PCS | Mod: S$PBB,,, | Performed by: NURSE PRACTITIONER

## 2022-12-19 PROCEDURE — 99999 PR PBB SHADOW E&M-EST. PATIENT-LVL V: CPT | Mod: PBBFAC,,, | Performed by: NURSE PRACTITIONER

## 2022-12-19 RX ORDER — FLASH GLUCOSE SENSOR
KIT MISCELLANEOUS
Qty: 7 KIT | Refills: 3 | Status: SHIPPED | OUTPATIENT
Start: 2022-12-19 | End: 2023-04-26

## 2022-12-19 RX ORDER — FLASH GLUCOSE SENSOR
KIT MISCELLANEOUS
Qty: 7 KIT | Refills: 3 | Status: SHIPPED | OUTPATIENT
Start: 2022-12-19 | End: 2022-12-19 | Stop reason: SDUPTHER

## 2022-12-19 RX ORDER — SUB-Q INSULIN DEVICE, 40 UNIT
EACH MISCELLANEOUS
Qty: 90 EACH | Refills: 3 | Status: SHIPPED | OUTPATIENT
Start: 2022-12-19 | End: 2023-05-01 | Stop reason: SDUPTHER

## 2022-12-19 NOTE — PATIENT INSTRUCTIONS
Follow up in 4 months w/Betty  A1c urine mac prior (1-7 days before appt)   A1c goal less than 7%   Uses alexsander- fanny on phone    Vgo 30  5-6 clicks before meals   Continue metformin twice a day     Lab Results   Component Value Date    HGBA1C 6.8 (H) 12/14/2022   Goal less than 7%     [email or web address]  Eat fit fanny  MyMMISpal fanny  [email or web address]

## 2022-12-28 DIAGNOSIS — E11.42 DIABETIC PERIPHERAL NEUROPATHY ASSOCIATED WITH TYPE 2 DIABETES MELLITUS: ICD-10-CM

## 2022-12-28 RX ORDER — ATORVASTATIN CALCIUM 20 MG/1
TABLET, FILM COATED ORAL
Qty: 90 TABLET | Refills: 3 | Status: SHIPPED | OUTPATIENT
Start: 2022-12-28 | End: 2023-05-01 | Stop reason: SDUPTHER

## 2023-01-11 ENCOUNTER — OFFICE VISIT (OUTPATIENT)
Dept: INTERNAL MEDICINE | Facility: CLINIC | Age: 73
End: 2023-01-11
Payer: MEDICARE

## 2023-01-11 VITALS
HEIGHT: 63 IN | WEIGHT: 171.94 LBS | BODY MASS INDEX: 30.46 KG/M2 | DIASTOLIC BLOOD PRESSURE: 80 MMHG | HEART RATE: 87 BPM | SYSTOLIC BLOOD PRESSURE: 132 MMHG | OXYGEN SATURATION: 96 %

## 2023-01-11 DIAGNOSIS — E11.42 TYPE 2 DIABETES MELLITUS WITH DIABETIC POLYNEUROPATHY, WITH LONG-TERM CURRENT USE OF INSULIN: ICD-10-CM

## 2023-01-11 DIAGNOSIS — E03.9 HYPOTHYROIDISM, ACQUIRED: ICD-10-CM

## 2023-01-11 DIAGNOSIS — M85.80 OSTEOPENIA, UNSPECIFIED LOCATION: ICD-10-CM

## 2023-01-11 DIAGNOSIS — I15.2 HYPERTENSION ASSOCIATED WITH DIABETES: ICD-10-CM

## 2023-01-11 DIAGNOSIS — E78.5 HYPERLIPIDEMIA ASSOCIATED WITH TYPE 2 DIABETES MELLITUS: ICD-10-CM

## 2023-01-11 DIAGNOSIS — E11.42 DIABETIC PERIPHERAL NEUROPATHY ASSOCIATED WITH TYPE 2 DIABETES MELLITUS: ICD-10-CM

## 2023-01-11 DIAGNOSIS — E11.59 HYPERTENSION ASSOCIATED WITH DIABETES: ICD-10-CM

## 2023-01-11 DIAGNOSIS — E11.69 HYPERLIPIDEMIA ASSOCIATED WITH TYPE 2 DIABETES MELLITUS: ICD-10-CM

## 2023-01-11 DIAGNOSIS — E55.9 VITAMIN D DEFICIENCY: ICD-10-CM

## 2023-01-11 DIAGNOSIS — I70.0 AORTIC ATHEROSCLEROSIS: ICD-10-CM

## 2023-01-11 DIAGNOSIS — Z00.00 ENCOUNTER FOR ANNUAL PHYSICAL EXAM: Primary | ICD-10-CM

## 2023-01-11 DIAGNOSIS — H90.3 SENSORINEURAL HEARING LOSS (SNHL) OF BOTH EARS: ICD-10-CM

## 2023-01-11 DIAGNOSIS — G43.109 OCULAR MIGRAINE: ICD-10-CM

## 2023-01-11 DIAGNOSIS — Z79.4 TYPE 2 DIABETES MELLITUS WITH DIABETIC POLYNEUROPATHY, WITH LONG-TERM CURRENT USE OF INSULIN: ICD-10-CM

## 2023-01-11 PROCEDURE — 99397 PER PM REEVAL EST PAT 65+ YR: CPT | Mod: S$PBB,GZ,, | Performed by: INTERNAL MEDICINE

## 2023-01-11 PROCEDURE — 99999 PR PBB SHADOW E&M-EST. PATIENT-LVL IV: ICD-10-PCS | Mod: PBBFAC,,, | Performed by: INTERNAL MEDICINE

## 2023-01-11 PROCEDURE — 99397 PR PREVENTIVE VISIT,EST,65 & OVER: ICD-10-PCS | Mod: S$PBB,GZ,, | Performed by: INTERNAL MEDICINE

## 2023-01-11 PROCEDURE — 99999 PR PBB SHADOW E&M-EST. PATIENT-LVL IV: CPT | Mod: PBBFAC,,, | Performed by: INTERNAL MEDICINE

## 2023-01-11 PROCEDURE — 99214 OFFICE O/P EST MOD 30 MIN: CPT | Mod: PBBFAC | Performed by: INTERNAL MEDICINE

## 2023-01-11 NOTE — PROGRESS NOTES
Subjective:       Patient ID: Linda Fong is a 72 y.o. female.    Chief Complaint: Follow-up (6 mth follow up )      HPI  Linda Fong is a 72 y.o. year old female with t2DM, HTN, HLD, hypothyroidism, vit D deficiency, environmental and seasonal allergies, occular migraines, osteopenia, aortic atherosclerosis presents for annual exam. Recent URI symptoms which she is recovering from.  also here with increased dyspnea on exertion and URI symptoms.     Review of Systems   Constitutional:  Negative for activity change, appetite change, fatigue, fever and unexpected weight change.   HENT:  Negative for congestion, hearing loss, postnasal drip, sneezing, sore throat, trouble swallowing and voice change.    Eyes:  Negative for pain and discharge.   Respiratory:  Negative for cough, choking, chest tightness, shortness of breath and wheezing.    Cardiovascular:  Negative for chest pain, palpitations and leg swelling.   Gastrointestinal:  Negative for abdominal distention, abdominal pain, blood in stool, constipation, diarrhea, nausea and vomiting.   Endocrine: Negative for polydipsia and polyuria.   Genitourinary:  Negative for difficulty urinating and flank pain.   Musculoskeletal:  Negative for arthralgias, back pain, joint swelling, myalgias and neck pain.   Skin:  Negative for rash.   Neurological:  Negative for dizziness, tremors, seizures, weakness, numbness and headaches.   Psychiatric/Behavioral:  Negative for agitation. The patient is not nervous/anxious.        Past Medical History:   Diagnosis Date    Altered mental status 1/5/2019    CVA (cerebral vascular accident) 1/5/2019    Diabetic nephropathy     DJD (degenerative joint disease)     Goiter     HTN (hypertension)     Hyperlipidemia     Intracranial bleeding     12/10    Osteopenia     PN (peripheral neuropathy)     Spell of altered cognition 5/19/2014    TIA (transient ischemic attack)     Traumatic brain injury Dec 2010    Type II or  unspecified type diabetes mellitus with renal manifestations, uncontrolled(250.42)         Prior to Admission medications    Medication Sig Start Date End Date Taking? Authorizing Provider   ascorbic acid, vitamin C, (VITAMIN C) 100 MG tablet Take 100 mg by mouth once daily.   Yes Historical Provider   aspirin (ECOTRIN) 81 MG EC tablet Take 81 mg by mouth once daily.   Yes Historical Provider   atorvastatin (LIPITOR) 20 MG tablet TAKE 1 TABLET ONCE DAILY 12/28/22  Yes LOVE Dailey FNP   biotin 1 mg Cap Take 1 capsule by mouth once daily.   Yes Historical Provider   blood sugar diagnostic Strp Accuchek guide meter. To check BG 3  times daily, to use with insurance preferred meter, e 11.65 8/24/22  Yes LOVE Dailey FNP   blood-glucose meter kit Accuchek guide meter. To check BG 3  times daily, to use with insurance preferred meter, e 11.65 8/24/22 8/24/23 Yes LOVE Dailey FNP   dulaglutide (TRULICITY) 1.5 mg/0.5 mL pen injector INJECT 0.5ML (=1.5 MG)     SUBCUTANEOUSLY EVERY 7 DAYS 9/19/22  Yes Vibha Gutierrez NP   ergocalciferol (ERGOCALCIFEROL) 50,000 unit Cap TAKE 1 CAPSULE EVERY 30    DAYS 9/20/22  Yes Rob Phillips MD   flash glucose sensor (FREESTYLE SIDNEY 14 DAY SENSOR) Kit Change every 14 days 12/19/22  Yes LOVE Dailey FNP   insulin aspart U-100 (NOVOLOG U-100 INSULIN ASPART) 100 unit/mL injection Uses vgo 40, max daily 130 units, 12 vials per 90 days, 6 clicks with meals, correction scale 180-230+2, 231-280+4, 281-330+6, 331-380+8, >380+10. 8/24/22  Yes LOVE Dailey FNP   lancets Misc To check BG 3  times daily, to use with insurance preferred meter, e 11.65 8/10/21  Yes LOVE Dailey FNP   levothyroxine (SYNTHROID) 25 MCG tablet TAKE 1 TABLET DAILY 8/27/22  Yes LOVE Dailey FNP   losartan-hydrochlorothiazide 50-12.5 mg (HYZAAR) 50-12.5 mg per tablet TAKE 1 TABLET ONCE DAILY 9/20/22  Yes Rob Phillips MD   metFORMIN (GLUCOPHAGE) 1000 MG  "tablet TAKE 1 TABLET TWICE DAILY  WITH MEALS 8/22/22  Yes LOVE Dailey FNP   multivitamin capsule Take 1 capsule by mouth once daily.   Yes Historical Provider   omega-3s/dha/epa/fish oil/D3 (VITAMIN-D + OMEGA-3 ORAL) Take by mouth.   Yes Historical Provider   sub-q insulin device, 30 unit (V-GO 30) Mariel Use daily. 12/19/22  Yes LOVE Dailey FNP   zinc gluconate 50 mg tablet Take 50 mg by mouth once daily.   Yes Historical Provider        Past medical history, surgical history, and family medical history reviewed and updated as appropriate.    Medications and allergies reviewed.     Objective:          Vitals:    01/11/23 0956   BP: 132/80   BP Location: Right arm   Patient Position: Sitting   BP Method: Medium (Manual)   Pulse: 87   SpO2: 96%   Weight: 78 kg (171 lb 15.3 oz)   Height: 5' 3" (1.6 m)     Body mass index is 30.46 kg/m².  Physical Exam  Constitutional:       Appearance: She is well-developed.   HENT:      Head: Normocephalic and atraumatic.   Eyes:      Extraocular Movements: Extraocular movements intact.   Cardiovascular:      Rate and Rhythm: Normal rate and regular rhythm.      Heart sounds: Normal heart sounds.   Pulmonary:      Effort: Pulmonary effort is normal. No respiratory distress.      Breath sounds: Normal breath sounds. No wheezing.   Abdominal:      General: Bowel sounds are normal. There is no distension.      Palpations: Abdomen is soft.      Tenderness: There is no abdominal tenderness.   Musculoskeletal:         General: No tenderness. Normal range of motion.      Cervical back: Normal range of motion.   Skin:     General: Skin is warm and dry.   Neurological:      Mental Status: She is alert and oriented to person, place, and time.      Cranial Nerves: No cranial nerve deficit.      Deep Tendon Reflexes: Reflexes are normal and symmetric.       Lab Results   Component Value Date    WBC 8.79 08/16/2022    HGB 12.9 08/16/2022    HCT 38.6 08/16/2022     " 08/16/2022    CHOL 170 01/11/2022    TRIG 217 (H) 01/11/2022    HDL 55 01/11/2022    ALT 15 05/04/2022    AST 19 05/04/2022     05/04/2022    K 4.3 05/04/2022     05/04/2022    CREATININE 0.8 05/04/2022    BUN 24 (H) 05/04/2022    CO2 31 (H) 05/04/2022    TSH 2.403 05/16/2022    INR 1.0 01/05/2019    HGBA1C 6.8 (H) 12/14/2022       Assessment:       1. Encounter for annual physical exam    2. Type 2 diabetes mellitus with diabetic polyneuropathy, with long-term current use of insulin    3. Hypertension associated with diabetes    4. Hypothyroidism, acquired    5. Hyperlipidemia associated with type 2 diabetes mellitus    6. Diabetic peripheral neuropathy associated with type 2 diabetes mellitus    7. Sensorineural hearing loss (SNHL) of both ears    8. Vitamin D deficiency    9. Osteopenia, unspecified location    10. Ocular migraine    11. Aortic atherosclerosis          Plan:     Linda was seen today for follow-up.    Diagnoses and all orders for this visit:    Encounter for annual physical exam    Type 2 diabetes mellitus with diabetic polyneuropathy, with long-term current use of insulin  Comments:  v-go 30; trulicity 1.5, metformin; controlled  Orders:  -     Microalbumin/Creatinine Ratio, Urine; Future  -     Ambulatory referral/consult to Optometry; Future  -     Lipid Panel; Future    Hypertension associated with diabetes  Comments:  controlled on current medications, no changes to management  Orders:  -     Lipid Panel; Future  -     TSH; Future  -     CBC W/ AUTO DIFFERENTIAL; Future  -     COMPREHENSIVE METABOLIC PANEL; Future    Hypothyroidism, acquired  Comments:  on levothryoxine 25 mcg daily, clinically euthyroid, no changes to management    Hyperlipidemia associated with type 2 diabetes mellitus  Comments:  controlled on current medications, no changes to management    Diabetic peripheral neuropathy associated with type 2 diabetes mellitus  Comments:  not taking any medications,  stable    Sensorineural hearing loss (SNHL) of both ears    Vitamin D deficiency  Comments:  on vitamin d 50k units qweek    Osteopenia, unspecified location  Comments:  seen on last dxa scan, on vitamin D / calcium    Ocular migraine  Comments:  stable, no changes to current management    Aortic atherosclerosis  Comments:  on ASA, statin, bp controlled    Benign physical examination, no issues identified. Will obtain routine labwork and age appropriate health screenings.     Health maintenance reviewed with patient. Patient is due for flu, covid-19 vaccination (refused). Will obtain fasting labwork with lipid panel with blood work due 4/2022.     Follow up in about 6 months (around 7/11/2023).    Rob Phillips MD  Internal Medicine / Primary Care  [facility]  1/11/2023

## 2023-01-11 NOTE — PATIENT INSTRUCTIONS
Labwork ordered and attached to upcoming lab appointment in April  Schedule optometry appointment    Bone density scan on 1/31/2023 already scheduled.   Mammogram on 2/9/2023 already scheduled.    Return to clinic in 6 months or sooner if needed.

## 2023-01-31 ENCOUNTER — HOSPITAL ENCOUNTER (OUTPATIENT)
Dept: RADIOLOGY | Facility: CLINIC | Age: 73
Discharge: HOME | End: 2023-01-31
Attending: NURSE PRACTITIONER
Payer: MEDICARE

## 2023-01-31 DIAGNOSIS — Z78.0 POSTMENOPAUSAL STATUS: ICD-10-CM

## 2023-01-31 PROCEDURE — 77080 DXA BONE DENSITY AXIAL: CPT | Mod: TC

## 2023-01-31 PROCEDURE — 77080 DXA BONE DENSITY AXIAL: CPT | Mod: 26,,, | Performed by: INTERNAL MEDICINE

## 2023-01-31 PROCEDURE — 77080 DEXA BONE DENSITY SPINE HIP: ICD-10-PCS | Mod: 26,,, | Performed by: INTERNAL MEDICINE

## 2023-02-09 ENCOUNTER — HOSPITAL ENCOUNTER (OUTPATIENT)
Dept: RADIOLOGY | Facility: HOSPITAL | Age: 73
Discharge: HOME | End: 2023-02-09
Attending: NURSE PRACTITIONER
Payer: MEDICARE

## 2023-02-09 DIAGNOSIS — Z12.31 ENCOUNTER FOR SCREENING MAMMOGRAM FOR MALIGNANT NEOPLASM OF BREAST: ICD-10-CM

## 2023-02-09 PROCEDURE — 77063 MAMMO DIGITAL SCREENING BILAT WITH TOMO: ICD-10-PCS | Mod: 26,,, | Performed by: RADIOLOGY

## 2023-02-09 PROCEDURE — 77063 BREAST TOMOSYNTHESIS BI: CPT | Mod: 26,,, | Performed by: RADIOLOGY

## 2023-02-09 PROCEDURE — 77067 SCR MAMMO BI INCL CAD: CPT | Mod: 26,,, | Performed by: RADIOLOGY

## 2023-02-09 PROCEDURE — 77067 SCR MAMMO BI INCL CAD: CPT | Mod: TC

## 2023-02-09 PROCEDURE — 77067 MAMMO DIGITAL SCREENING BILAT WITH TOMO: ICD-10-PCS | Mod: 26,,, | Performed by: RADIOLOGY

## 2023-02-12 ENCOUNTER — PATIENT MESSAGE (OUTPATIENT)
Dept: INTERNAL MEDICINE | Facility: CLINIC | Age: 73
End: 2023-02-12
Payer: MEDICARE

## 2023-03-25 DIAGNOSIS — E11.42 TYPE 2 DIABETES MELLITUS WITH DIABETIC POLYNEUROPATHY, WITH LONG-TERM CURRENT USE OF INSULIN: Chronic | ICD-10-CM

## 2023-03-25 DIAGNOSIS — Z79.4 TYPE 2 DIABETES MELLITUS WITH DIABETIC POLYNEUROPATHY, WITH LONG-TERM CURRENT USE OF INSULIN: Chronic | ICD-10-CM

## 2023-03-27 RX ORDER — DULAGLUTIDE 1.5 MG/.5ML
INJECTION, SOLUTION SUBCUTANEOUS
Qty: 12 PEN | Refills: 2 | Status: SHIPPED | OUTPATIENT
Start: 2023-03-27 | End: 2023-05-01 | Stop reason: SDUPTHER

## 2023-03-30 ENCOUNTER — TELEPHONE (OUTPATIENT)
Dept: INTERNAL MEDICINE | Facility: CLINIC | Age: 73
End: 2023-03-30
Payer: MEDICARE

## 2023-04-03 ENCOUNTER — PATIENT MESSAGE (OUTPATIENT)
Dept: ADMINISTRATIVE | Facility: HOSPITAL | Age: 73
End: 2023-04-03
Payer: MEDICARE

## 2023-04-04 ENCOUNTER — TELEPHONE (OUTPATIENT)
Dept: OPHTHALMOLOGY | Facility: CLINIC | Age: 73
End: 2023-04-04
Payer: MEDICARE

## 2023-04-04 NOTE — TELEPHONE ENCOUNTER
----- Message from Bell Dukes sent at 4/4/2023  1:10 PM CDT -----  Regarding: Referral  Contact: Pt  Pt is requesting a callback in regards tos scheduling appt. From referral in [facility]. Pt stated she need to be seen sooner due to vision and her glasses is broken.  Please adv pt.         Confirmed contact below:   Contact Name:Linda Priceillan  Phone Number: [phone]

## 2023-04-26 RX ORDER — FLASH GLUCOSE SENSOR
KIT MISCELLANEOUS
Qty: 2 KIT | Refills: 6 | Status: SHIPPED | OUTPATIENT
Start: 2023-04-26 | End: 2023-05-01 | Stop reason: SDUPTHER

## 2023-04-27 ENCOUNTER — LAB VISIT (OUTPATIENT)
Dept: LAB | Facility: HOSPITAL | Age: 73
End: 2023-04-27
Payer: MEDICARE

## 2023-04-27 DIAGNOSIS — Z79.4 TYPE 2 DIABETES MELLITUS WITH DIABETIC POLYNEUROPATHY, WITH LONG-TERM CURRENT USE OF INSULIN: Chronic | ICD-10-CM

## 2023-04-27 DIAGNOSIS — E11.59 HYPERTENSION ASSOCIATED WITH DIABETES: ICD-10-CM

## 2023-04-27 DIAGNOSIS — I15.2 HYPERTENSION ASSOCIATED WITH DIABETES: ICD-10-CM

## 2023-04-27 DIAGNOSIS — E11.42 TYPE 2 DIABETES MELLITUS WITH DIABETIC POLYNEUROPATHY, WITH LONG-TERM CURRENT USE OF INSULIN: ICD-10-CM

## 2023-04-27 DIAGNOSIS — E55.9 VITAMIN D DEFICIENCY: ICD-10-CM

## 2023-04-27 DIAGNOSIS — Z79.4 TYPE 2 DIABETES MELLITUS WITH DIABETIC POLYNEUROPATHY, WITH LONG-TERM CURRENT USE OF INSULIN: ICD-10-CM

## 2023-04-27 DIAGNOSIS — E11.42 TYPE 2 DIABETES MELLITUS WITH DIABETIC POLYNEUROPATHY, WITH LONG-TERM CURRENT USE OF INSULIN: Chronic | ICD-10-CM

## 2023-04-27 LAB
25(OH)D3+25(OH)D2 SERPL-MCNC: 60 NG/ML (ref 30–96)
ALBUMIN SERPL BCP-MCNC: 3.5 G/DL (ref 3.5–5.2)
ALP SERPL-CCNC: 82 U/L (ref 55–135)
ALT SERPL W/O P-5'-P-CCNC: 15 U/L (ref 10–44)
ANION GAP SERPL CALC-SCNC: 9 MMOL/L (ref 8–16)
AST SERPL-CCNC: 21 U/L (ref 10–40)
BASOPHILS # BLD AUTO: 0.05 K/UL (ref 0–0.2)
BASOPHILS NFR BLD: 0.5 % (ref 0–1.9)
BILIRUB SERPL-MCNC: 1 MG/DL (ref 0.1–1)
BUN SERPL-MCNC: 13 MG/DL (ref 8–23)
CALCIUM SERPL-MCNC: 9.9 MG/DL (ref 8.7–10.5)
CHLORIDE SERPL-SCNC: 102 MMOL/L (ref 95–110)
CHOLEST SERPL-MCNC: 150 MG/DL (ref 120–199)
CHOLEST/HDLC SERPL: 2.6 {RATIO} (ref 2–5)
CO2 SERPL-SCNC: 30 MMOL/L (ref 23–29)
CREAT SERPL-MCNC: 0.8 MG/DL (ref 0.5–1.4)
DIFFERENTIAL METHOD: ABNORMAL
EOSINOPHIL # BLD AUTO: 0.4 K/UL (ref 0–0.5)
EOSINOPHIL NFR BLD: 4 % (ref 0–8)
ERYTHROCYTE [DISTWIDTH] IN BLOOD BY AUTOMATED COUNT: 14.1 % (ref 11.5–14.5)
EST. GFR  (NO RACE VARIABLE): >60 ML/MIN/1.73 M^2
ESTIMATED AVG GLUCOSE: 146 MG/DL (ref 68–131)
GLUCOSE SERPL-MCNC: 167 MG/DL (ref 70–110)
HBA1C MFR BLD: 6.7 % (ref 4–5.6)
HCT VFR BLD AUTO: 40 % (ref 37–48.5)
HDLC SERPL-MCNC: 57 MG/DL (ref 40–75)
HDLC SERPL: 38 % (ref 20–50)
HGB BLD-MCNC: 12.7 G/DL (ref 12–16)
IMM GRANULOCYTES # BLD AUTO: 0.02 K/UL (ref 0–0.04)
IMM GRANULOCYTES NFR BLD AUTO: 0.2 % (ref 0–0.5)
LDLC SERPL CALC-MCNC: 70.2 MG/DL (ref 63–159)
LYMPHOCYTES # BLD AUTO: 2.8 K/UL (ref 1–4.8)
LYMPHOCYTES NFR BLD: 29.7 % (ref 18–48)
MCH RBC QN AUTO: 28.5 PG (ref 27–31)
MCHC RBC AUTO-ENTMCNC: 31.8 G/DL (ref 32–36)
MCV RBC AUTO: 90 FL (ref 82–98)
MONOCYTES # BLD AUTO: 0.6 K/UL (ref 0.3–1)
MONOCYTES NFR BLD: 6.9 % (ref 4–15)
NEUTROPHILS # BLD AUTO: 5.4 K/UL (ref 1.8–7.7)
NEUTROPHILS NFR BLD: 58.7 % (ref 38–73)
NONHDLC SERPL-MCNC: 93 MG/DL
NRBC BLD-RTO: 0 /100 WBC
PLATELET # BLD AUTO: 258 K/UL (ref 150–450)
PMV BLD AUTO: 11 FL (ref 9.2–12.9)
POTASSIUM SERPL-SCNC: 4.3 MMOL/L (ref 3.5–5.1)
PROT SERPL-MCNC: 8 G/DL (ref 6–8.4)
RBC # BLD AUTO: 4.46 M/UL (ref 4–5.4)
SODIUM SERPL-SCNC: 141 MMOL/L (ref 136–145)
T4 FREE SERPL-MCNC: 1.02 NG/DL (ref 0.71–1.51)
TRIGL SERPL-MCNC: 114 MG/DL (ref 30–150)
TSH SERPL DL<=0.005 MIU/L-ACNC: 7.85 UIU/ML (ref 0.4–4)
WBC # BLD AUTO: 9.27 K/UL (ref 3.9–12.7)

## 2023-04-27 PROCEDURE — 80053 COMPREHEN METABOLIC PANEL: CPT | Performed by: INTERNAL MEDICINE

## 2023-04-27 PROCEDURE — 84443 ASSAY THYROID STIM HORMONE: CPT | Performed by: INTERNAL MEDICINE

## 2023-04-27 PROCEDURE — 82306 VITAMIN D 25 HYDROXY: CPT | Performed by: INTERNAL MEDICINE

## 2023-04-27 PROCEDURE — 83036 HEMOGLOBIN GLYCOSYLATED A1C: CPT | Performed by: NURSE PRACTITIONER

## 2023-04-27 PROCEDURE — 84439 ASSAY OF FREE THYROXINE: CPT | Performed by: INTERNAL MEDICINE

## 2023-04-27 PROCEDURE — 36415 COLL VENOUS BLD VENIPUNCTURE: CPT | Performed by: INTERNAL MEDICINE

## 2023-04-27 PROCEDURE — 85025 COMPLETE CBC W/AUTO DIFF WBC: CPT | Performed by: INTERNAL MEDICINE

## 2023-04-27 PROCEDURE — 80061 LIPID PANEL: CPT | Performed by: INTERNAL MEDICINE

## 2023-04-28 ENCOUNTER — TELEPHONE (OUTPATIENT)
Dept: INTERNAL MEDICINE | Facility: CLINIC | Age: 73
End: 2023-04-28
Payer: MEDICARE

## 2023-04-28 NOTE — TELEPHONE ENCOUNTER
----- Message from Rob Phillips MD sent at 4/27/2023  1:45 PM CDT -----  Tried calling patient, no answer. Did not leave VM. Tried calling , did not answer, did not leave VM.     Please ask pt if she is taking her levothyroxine 25 mcg daily (without missing doses). If not, repeat TSH in 4 weeks. If so, will increase to 37.5 mcg daily.

## 2023-05-01 ENCOUNTER — OFFICE VISIT (OUTPATIENT)
Dept: INTERNAL MEDICINE | Facility: CLINIC | Age: 73
End: 2023-05-01
Payer: MEDICARE

## 2023-05-01 ENCOUNTER — TELEPHONE (OUTPATIENT)
Dept: INTERNAL MEDICINE | Facility: CLINIC | Age: 73
End: 2023-05-01
Payer: MEDICARE

## 2023-05-01 VITALS
BODY MASS INDEX: 30 KG/M2 | WEIGHT: 169.31 LBS | SYSTOLIC BLOOD PRESSURE: 122 MMHG | DIASTOLIC BLOOD PRESSURE: 70 MMHG | HEART RATE: 83 BPM | HEIGHT: 63 IN | OXYGEN SATURATION: 97 %

## 2023-05-01 DIAGNOSIS — Z79.4 TYPE 2 DIABETES MELLITUS WITH DIABETIC POLYNEUROPATHY, WITH LONG-TERM CURRENT USE OF INSULIN: Primary | Chronic | ICD-10-CM

## 2023-05-01 DIAGNOSIS — E78.5 HYPERLIPIDEMIA ASSOCIATED WITH TYPE 2 DIABETES MELLITUS: Chronic | ICD-10-CM

## 2023-05-01 DIAGNOSIS — E55.9 VITAMIN D DEFICIENCY DISEASE: ICD-10-CM

## 2023-05-01 DIAGNOSIS — I15.2 HYPERTENSION ASSOCIATED WITH DIABETES: Chronic | ICD-10-CM

## 2023-05-01 DIAGNOSIS — E03.8 SUBCLINICAL HYPOTHYROIDISM: ICD-10-CM

## 2023-05-01 DIAGNOSIS — E11.42 TYPE 2 DIABETES MELLITUS WITH DIABETIC POLYNEUROPATHY, WITH LONG-TERM CURRENT USE OF INSULIN: Primary | Chronic | ICD-10-CM

## 2023-05-01 DIAGNOSIS — E11.42 DIABETIC PERIPHERAL NEUROPATHY ASSOCIATED WITH TYPE 2 DIABETES MELLITUS: Chronic | ICD-10-CM

## 2023-05-01 DIAGNOSIS — E03.9 HYPOTHYROIDISM, ACQUIRED: Chronic | ICD-10-CM

## 2023-05-01 DIAGNOSIS — E11.59 HYPERTENSION ASSOCIATED WITH DIABETES: Chronic | ICD-10-CM

## 2023-05-01 DIAGNOSIS — E11.69 HYPERLIPIDEMIA ASSOCIATED WITH TYPE 2 DIABETES MELLITUS: Chronic | ICD-10-CM

## 2023-05-01 PROCEDURE — 99999 PR PBB SHADOW E&M-EST. PATIENT-LVL IV: ICD-10-PCS | Mod: PBBFAC,,, | Performed by: NURSE PRACTITIONER

## 2023-05-01 PROCEDURE — 99214 OFFICE O/P EST MOD 30 MIN: CPT | Mod: S$PBB,,, | Performed by: NURSE PRACTITIONER

## 2023-05-01 PROCEDURE — 99214 PR OFFICE/OUTPT VISIT, EST, LEVL IV, 30-39 MIN: ICD-10-PCS | Mod: S$PBB,,, | Performed by: NURSE PRACTITIONER

## 2023-05-01 PROCEDURE — 99999 PR PBB SHADOW E&M-EST. PATIENT-LVL IV: CPT | Mod: PBBFAC,,, | Performed by: NURSE PRACTITIONER

## 2023-05-01 PROCEDURE — 99214 OFFICE O/P EST MOD 30 MIN: CPT | Mod: PBBFAC | Performed by: NURSE PRACTITIONER

## 2023-05-01 RX ORDER — ERGOCALCIFEROL 1.25 MG/1
50000 CAPSULE ORAL
Qty: 3 CAPSULE | Refills: 3 | Status: SHIPPED | OUTPATIENT
Start: 2023-05-01 | End: 2023-06-06

## 2023-05-01 RX ORDER — LOSARTAN POTASSIUM AND HYDROCHLOROTHIAZIDE 12.5; 5 MG/1; MG/1
1 TABLET ORAL DAILY
Qty: 90 TABLET | Refills: 3 | Status: SHIPPED | OUTPATIENT
Start: 2023-05-01

## 2023-05-01 RX ORDER — METFORMIN HYDROCHLORIDE 1000 MG/1
1000 TABLET ORAL 2 TIMES DAILY WITH MEALS
Qty: 90 TABLET | Refills: 3 | Status: SHIPPED | OUTPATIENT
Start: 2023-05-01 | End: 2023-05-02

## 2023-05-01 RX ORDER — SUB-Q INSULIN DEVICE, 40 UNIT
EACH MISCELLANEOUS
Qty: 90 EACH | Refills: 3 | Status: SHIPPED | OUTPATIENT
Start: 2023-05-01 | End: 2023-09-07 | Stop reason: SDUPTHER

## 2023-05-01 RX ORDER — ATORVASTATIN CALCIUM 20 MG/1
20 TABLET, FILM COATED ORAL DAILY
Qty: 90 TABLET | Refills: 3 | Status: SHIPPED | OUTPATIENT
Start: 2023-05-01

## 2023-05-01 RX ORDER — FLASH GLUCOSE SENSOR
KIT MISCELLANEOUS
Qty: 7 KIT | Refills: 3 | Status: SHIPPED | OUTPATIENT
Start: 2023-05-01 | End: 2023-05-11 | Stop reason: SDUPTHER

## 2023-05-01 RX ORDER — LEVOTHYROXINE SODIUM 25 UG/1
TABLET ORAL
Qty: 90 TABLET | Refills: 0 | Status: SHIPPED | OUTPATIENT
Start: 2023-05-01 | End: 2023-06-05 | Stop reason: SDUPTHER

## 2023-05-01 RX ORDER — INSULIN ASPART 100 [IU]/ML
INJECTION, SOLUTION INTRAVENOUS; SUBCUTANEOUS
Qty: 120 ML | Refills: 3 | Status: SHIPPED | OUTPATIENT
Start: 2023-05-01 | End: 2024-01-25 | Stop reason: SDUPTHER

## 2023-05-01 RX ORDER — DULAGLUTIDE 1.5 MG/.5ML
1.5 INJECTION, SOLUTION SUBCUTANEOUS
Qty: 12 PEN | Refills: 2 | Status: SHIPPED | OUTPATIENT
Start: 2023-05-01 | End: 2024-01-25
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
## 2023-05-01 NOTE — PATIENT INSTRUCTIONS
Follow up in 4 months w/ Irielle  Tsh in 6 weeks   A1c urine  tsh in 4 months     Lab Results   Component Value Date    HGBA1C 6.7 (H) 04/27/2023     Goal less than 7%    [email or web address]  Eat fit fanny  Myfitnesspal fanny  [email or web address]     Vgo 30, 5 clicks at dinner, 5-6 clicks with breakfast , lunch   Novolog vials   Metformin 1000 mg in am   Trulicity 1.5 mg weekly     Jarod, [facility], [place] ,[facility] medical -for alexsander

## 2023-05-01 NOTE — Clinical Note
Brand vs generic levothyroxine Has not started generic Eating at night when taking it here and there Repeat tsh in 6 weeks fyi

## 2023-05-01 NOTE — TELEPHONE ENCOUNTER
Pt just wanted to let you know that her thyroid level was high and that she saw NP suárez and they will recheck her levels in 6 weeks

## 2023-05-02 RX ORDER — METFORMIN HYDROCHLORIDE 1000 MG/1
TABLET ORAL
Qty: 90 TABLET | Refills: 3 | Status: SHIPPED | OUTPATIENT
Start: 2023-05-02

## 2023-05-11 RX ORDER — FLASH GLUCOSE SENSOR
KIT MISCELLANEOUS
Qty: 7 KIT | Refills: 3 | Status: SHIPPED | OUTPATIENT
Start: 2023-05-11 | End: 2023-06-06 | Stop reason: SDUPTHER

## 2023-05-11 NOTE — TELEPHONE ENCOUNTER
----- Message from Kellen Forman sent at 5/11/2023  2:45 PM CDT -----  Contact: [phone]  Requesting an RX refill or new RX.  Is this a refill or new RX: Refill 1  RX name and strength (copy/paste from chart):  free stile alexsander 14 days sensor  Is this a 30 day or 90 day RX: 3 moths rx   Pharmacy name and phone # (copy/paste from chart):  [facility] in [facility].  # [phone]      The doctors have asked that we provide their patients with the following 2 reminders -- prescription refills can take up to 72 hours, and a friendly reminder that in the future you can use your [facility] account to request refills:

## 2023-05-25 ENCOUNTER — TELEPHONE (OUTPATIENT)
Dept: INTERNAL MEDICINE | Facility: CLINIC | Age: 73
End: 2023-05-25
Payer: MEDICARE

## 2023-05-25 NOTE — TELEPHONE ENCOUNTER
[facility] was told the PA # [id number] for V-go 30 is in progress. Ref # [id number].

## 2023-05-29 ENCOUNTER — TELEPHONE (OUTPATIENT)
Dept: INTERNAL MEDICINE | Facility: CLINIC | Age: 73
End: 2023-05-29
Payer: MEDICARE

## 2023-05-29 NOTE — TELEPHONE ENCOUNTER
Denied-Partial for sub-q insulin device, 30 unit (V-GO 30) Mariel.      The amount requested is over the limit allowed by your plan. You have met the drug criteria. However, we cannot approve your request of 90 for a 30 day supply. Therefore, this drug has been approved with a quantity limit of 30 per 30 days from 05/25/2023 until further notice. We may be able to make an exception to the quantity limit. Your prescriber will need to send us a statement explaining why you need this amount. Case ID: [id number]      Payer:  [employer] Patient's Payer    [phone]    Electronic appeal:  Not supported   Prior auth initiated by: [facility] - RADHIKA Mcmillan - [facility] AT Portal to Registered [facility] Sites    [phone]

## 2023-06-05 ENCOUNTER — PATIENT MESSAGE (OUTPATIENT)
Dept: INTERNAL MEDICINE | Facility: CLINIC | Age: 73
End: 2023-06-05
Payer: MEDICARE

## 2023-06-05 ENCOUNTER — TELEPHONE (OUTPATIENT)
Dept: INTERNAL MEDICINE | Facility: CLINIC | Age: 73
End: 2023-06-05
Payer: MEDICARE

## 2023-06-05 DIAGNOSIS — E55.9 VITAMIN D DEFICIENCY DISEASE: ICD-10-CM

## 2023-06-05 RX ORDER — LEVOTHYROXINE SODIUM 25 UG/1
TABLET ORAL
Qty: 90 TABLET | Refills: 2 | Status: SHIPPED | OUTPATIENT
Start: 2023-06-05 | End: 2023-06-05 | Stop reason: SDUPTHER

## 2023-06-05 RX ORDER — LEVOTHYROXINE SODIUM 25 UG/1
TABLET ORAL
Qty: 90 TABLET | Refills: 2 | Status: SHIPPED | OUTPATIENT
Start: 2023-06-05 | End: 2023-06-15 | Stop reason: SDUPTHER

## 2023-06-05 NOTE — TELEPHONE ENCOUNTER
Spoke to rep. She stated this call was in regards to an appeal then noticed an appeal was submitted today. Rep stated nothing further was needed. She gave ref #[id number]

## 2023-06-05 NOTE — TELEPHONE ENCOUNTER
----- Message from Sasha Argueta sent at 6/5/2023 11:15 AM CDT -----  Contact: hudson Cardozo [phone]  Cas medrano call back sub-q insulin device, 30 unit (V-GO 30) Mariel. Please call back her back

## 2023-06-06 RX ORDER — ERGOCALCIFEROL 1.25 MG/1
CAPSULE ORAL
Qty: 3 CAPSULE | Refills: 3 | Status: SHIPPED | OUTPATIENT
Start: 2023-06-06

## 2023-06-06 RX ORDER — FLASH GLUCOSE SENSOR
KIT MISCELLANEOUS
Qty: 7 KIT | Refills: 3 | Status: SHIPPED | OUTPATIENT
Start: 2023-06-06

## 2023-06-06 NOTE — TELEPHONE ENCOUNTER
Update- we rec'd a fax from pt's insurer stating aproved the V-go.  Notified the pt and she explained she has not rec'd the Freestyle Jerald.  She said she will check w/ [facility].    I lm on pharmacy VM asking for a returned call to discuss Freestyle Jerald.

## 2023-06-07 ENCOUNTER — TELEPHONE (OUTPATIENT)
Dept: INTERNAL MEDICINE | Facility: CLINIC | Age: 73
End: 2023-06-07
Payer: MEDICARE

## 2023-06-07 NOTE — TELEPHONE ENCOUNTER
Spoke to pt. Pt was unsure who left massage and when message was received. Did not see any note in chart regarding recent phone call. Let pt know to call if anything is needed.

## 2023-06-07 NOTE — TELEPHONE ENCOUNTER
----- Message from Paz Bishop sent at 6/7/2023 10:22 AM CDT -----  Contact: [phone] Patient  Patient is returning a phone call.  Who left a message for the patient: ?  Does patient know what this is regarding:  ?  Would you like a call back, or a response through your [facility] portal?:  call back  Comments:

## 2023-06-07 NOTE — TELEPHONE ENCOUNTER
[facility]'d the Rx for Freestyle Jerald sensor kit and it is ready.

## 2023-06-15 ENCOUNTER — OFFICE VISIT (OUTPATIENT)
Dept: OPTOMETRY | Facility: CLINIC | Age: 73
End: 2023-06-15
Payer: MEDICARE

## 2023-06-15 DIAGNOSIS — E11.36 TYPE 2 DIABETES MELLITUS WITH CATARACT: ICD-10-CM

## 2023-06-15 DIAGNOSIS — H25.13 SENILE NUCLEAR SCLEROSIS, BILATERAL: ICD-10-CM

## 2023-06-15 DIAGNOSIS — H52.03 HYPEROPIA WITH ASTIGMATISM AND PRESBYOPIA, BILATERAL: ICD-10-CM

## 2023-06-15 DIAGNOSIS — G43.109 OCULAR MIGRAINE: ICD-10-CM

## 2023-06-15 DIAGNOSIS — E03.9 HYPOTHYROIDISM, ACQUIRED: Primary | Chronic | ICD-10-CM

## 2023-06-15 DIAGNOSIS — H52.4 HYPEROPIA WITH ASTIGMATISM AND PRESBYOPIA, BILATERAL: ICD-10-CM

## 2023-06-15 DIAGNOSIS — H01.024 SQUAMOUS BLEPHARITIS OF UPPER EYELIDS OF BOTH EYES: ICD-10-CM

## 2023-06-15 DIAGNOSIS — E11.9 TYPE 2 DIABETES MELLITUS WITHOUT RETINOPATHY: ICD-10-CM

## 2023-06-15 DIAGNOSIS — H52.203 HYPEROPIA WITH ASTIGMATISM AND PRESBYOPIA, BILATERAL: ICD-10-CM

## 2023-06-15 DIAGNOSIS — H01.021 SQUAMOUS BLEPHARITIS OF UPPER EYELIDS OF BOTH EYES: ICD-10-CM

## 2023-06-15 PROCEDURE — 99999 PR PBB SHADOW E&M-EST. PATIENT-LVL III: ICD-10-PCS | Mod: PBBFAC,,, | Performed by: OPTOMETRIST

## 2023-06-15 PROCEDURE — 92015 PR REFRACTION: ICD-10-PCS | Mod: ,,, | Performed by: OPTOMETRIST

## 2023-06-15 PROCEDURE — 92014 COMPRE OPH EXAM EST PT 1/>: CPT | Mod: S$PBB,,, | Performed by: OPTOMETRIST

## 2023-06-15 PROCEDURE — 92015 DETERMINE REFRACTIVE STATE: CPT | Mod: ,,, | Performed by: OPTOMETRIST

## 2023-06-15 PROCEDURE — 99213 OFFICE O/P EST LOW 20 MIN: CPT | Mod: PBBFAC,PO | Performed by: OPTOMETRIST

## 2023-06-15 PROCEDURE — 99999 PR PBB SHADOW E&M-EST. PATIENT-LVL III: CPT | Mod: PBBFAC,,, | Performed by: OPTOMETRIST

## 2023-06-15 PROCEDURE — 92014 PR EYE EXAM, EST PATIENT,COMPREHESV: ICD-10-PCS | Mod: S$PBB,,, | Performed by: OPTOMETRIST

## 2023-06-15 RX ORDER — NEOMYCIN SULFATE, POLYMYXIN B SULFATE AND DEXAMETHASONE 3.5; 10000; 1 MG/ML; [USP'U]/ML; MG/ML
1 SUSPENSION/ DROPS OPHTHALMIC 4 TIMES DAILY
Qty: 5 ML | Refills: 0 | Status: SHIPPED | OUTPATIENT
Start: 2023-06-15 | End: 2023-06-22

## 2023-06-15 RX ORDER — LEVOTHYROXINE SODIUM 25 UG/1
TABLET ORAL
Qty: 90 TABLET | Refills: 2 | Status: SHIPPED | OUTPATIENT
Start: 2023-06-15 | End: 2023-09-07

## 2023-06-15 NOTE — PROGRESS NOTES
"HPI     Diabetic Eye Exam     Additional comments: Patient Linda Fong is a 73 year old female.           Comments    Pt here for annual diabetic eye exam.  Pt states that her glasses broke and went to [facility] in March 2023, states   that VA OU is not as clear as they should be.  Pt states occasional ocular migraines but has not had "in a while,"   episodes last 20 minutes.  Pt states that she has "crud" along her eyelids in the mornings and uses a   washcloth to remove.  Pt denies any eye pain.    DLS: 06/14/2021 with Dr. Wilson    Gtts: None    POHx:  1. Type 2 diabetes mellitus without retinopathy  2. Type 2 diabetes mellitus with diabetic polyneuropathy, with long-term   current use of insulin  3. Hypertensive retinopathy of both eyes  4. Nuclear sclerosis of both eyes  5. Squamous blepharitis of upper eyelids of both eyes  6. Ocular migraine  7. Hyperopia with astigmatism and presbyopia, bilateral    (+)DM  Hemoglobin A1C       Date                     Value               Ref Range             Status                04/27/2023               6.7 (H)             4.0 - 5.6 %           Final                   12/14/2022               6.8 (H)             4.0 - 5.6 %           Final                   08/16/2022               6.5 (H)             4.0 - 5.6 %           Final               Last edited by Shameka Delvalle on 6/15/2023  1:50 PM.            Assessment /Plan     For exam results, see Encounter Report.      Hypothyroidism, acquired  No e/o ocular manifestations. Pt reports she was taking Synthroid prescribed by J Luis Stevens. Pt was then placed on Levothyroxine by Dr Phillips. Pt reports she was instructed to stay on Synthroid but needs refills. Message sent to SUMAYA Stevens.     Type 2 diabetes mellitus without retinopathy  BS control. No signs of diabetic retinopathy. Monitor with annual exam.    Senile nuclear sclerosis, bilateral  Type 2 diabetes mellitus with cataract  Nuclear sclerotic cataract - not visually " significant. Observe.    Squamous blepharitis of upper eyelids of both eyes  Rx Maxitrol QID OU x 7 days.     Ocular migraine  Stable. Monitor.     Hyperopia with astigmatism and presbyopia, bilateral  SRx released to patient. Patient educated on lens options. Normal ocular health. RTC 1 year for routine exam.

## 2023-07-10 ENCOUNTER — LAB VISIT (OUTPATIENT)
Dept: LAB | Facility: HOSPITAL | Age: 73
End: 2023-07-10
Attending: INTERNAL MEDICINE
Payer: MEDICARE

## 2023-07-10 ENCOUNTER — OFFICE VISIT (OUTPATIENT)
Dept: INTERNAL MEDICINE | Facility: CLINIC | Age: 73
End: 2023-07-10
Payer: MEDICARE

## 2023-07-10 VITALS
WEIGHT: 171.94 LBS | OXYGEN SATURATION: 96 % | SYSTOLIC BLOOD PRESSURE: 118 MMHG | DIASTOLIC BLOOD PRESSURE: 68 MMHG | BODY MASS INDEX: 30.46 KG/M2 | HEIGHT: 63 IN | HEART RATE: 88 BPM

## 2023-07-10 DIAGNOSIS — E55.9 VITAMIN D DEFICIENCY DISEASE: ICD-10-CM

## 2023-07-10 DIAGNOSIS — E11.42 TYPE 2 DIABETES MELLITUS WITH DIABETIC POLYNEUROPATHY, WITH LONG-TERM CURRENT USE OF INSULIN: ICD-10-CM

## 2023-07-10 DIAGNOSIS — E78.5 HYPERLIPIDEMIA ASSOCIATED WITH TYPE 2 DIABETES MELLITUS: ICD-10-CM

## 2023-07-10 DIAGNOSIS — E03.9 HYPOTHYROIDISM, ACQUIRED: ICD-10-CM

## 2023-07-10 DIAGNOSIS — E11.69 HYPERLIPIDEMIA ASSOCIATED WITH TYPE 2 DIABETES MELLITUS: ICD-10-CM

## 2023-07-10 DIAGNOSIS — Z79.4 TYPE 2 DIABETES MELLITUS WITH DIABETIC POLYNEUROPATHY, WITH LONG-TERM CURRENT USE OF INSULIN: ICD-10-CM

## 2023-07-10 DIAGNOSIS — I15.2 HYPERTENSION ASSOCIATED WITH DIABETES: Primary | ICD-10-CM

## 2023-07-10 DIAGNOSIS — E11.59 HYPERTENSION ASSOCIATED WITH DIABETES: Primary | ICD-10-CM

## 2023-07-10 LAB — TSH SERPL DL<=0.005 MIU/L-ACNC: 2.82 UIU/ML (ref 0.4–4)

## 2023-07-10 PROCEDURE — 84443 ASSAY THYROID STIM HORMONE: CPT | Performed by: INTERNAL MEDICINE

## 2023-07-10 PROCEDURE — 36415 COLL VENOUS BLD VENIPUNCTURE: CPT | Performed by: INTERNAL MEDICINE

## 2023-07-10 PROCEDURE — 99214 OFFICE O/P EST MOD 30 MIN: CPT | Mod: PBBFAC | Performed by: INTERNAL MEDICINE

## 2023-07-10 PROCEDURE — 99999 PR PBB SHADOW E&M-EST. PATIENT-LVL IV: CPT | Mod: PBBFAC,,, | Performed by: INTERNAL MEDICINE

## 2023-07-10 PROCEDURE — 99214 PR OFFICE/OUTPT VISIT, EST, LEVL IV, 30-39 MIN: ICD-10-PCS | Mod: S$PBB,,, | Performed by: INTERNAL MEDICINE

## 2023-07-10 PROCEDURE — 99214 OFFICE O/P EST MOD 30 MIN: CPT | Mod: S$PBB,,, | Performed by: INTERNAL MEDICINE

## 2023-07-10 PROCEDURE — 99999 PR PBB SHADOW E&M-EST. PATIENT-LVL IV: ICD-10-PCS | Mod: PBBFAC,,, | Performed by: INTERNAL MEDICINE

## 2023-07-10 NOTE — PROGRESS NOTES
Subjective:       Patient ID: Linda Fong is a 73 y.o. female.    Chief Complaint: Follow-up      HPI  Linda Fong is a 73 y.o. year old female with t2DM, HTN, HLD, hypothyroidism, vit D deficiency, environmental and seasonal allergies, occular migraines, osteopenia, aortic atherosclerosis presents for follow up. Doing well, at baseline health.    Review of Systems   Constitutional:  Negative for activity change, appetite change, fatigue, fever and unexpected weight change.   HENT:  Negative for congestion, hearing loss, postnasal drip, sneezing, sore throat, trouble swallowing and voice change.    Eyes:  Negative for pain and discharge.   Respiratory:  Negative for cough, choking, chest tightness, shortness of breath and wheezing.    Cardiovascular:  Negative for chest pain, palpitations and leg swelling.   Gastrointestinal:  Negative for abdominal distention, abdominal pain, blood in stool, constipation, diarrhea, nausea and vomiting.   Endocrine: Negative for polydipsia and polyuria.   Genitourinary:  Negative for difficulty urinating and flank pain.   Musculoskeletal:  Negative for arthralgias, back pain, joint swelling, myalgias and neck pain.   Skin:  Negative for rash.   Neurological:  Negative for dizziness, tremors, seizures, weakness, numbness and headaches.   Psychiatric/Behavioral:  Negative for agitation. The patient is not nervous/anxious.        Past Medical History:   Diagnosis Date    Altered mental status 1/5/2019    CVA (cerebral vascular accident) 1/5/2019    Diabetic nephropathy     DJD (degenerative joint disease)     Goiter     HTN (hypertension)     Hyperlipidemia     Intracranial bleeding     12/10    Osteopenia     PN (peripheral neuropathy)     Spell of altered cognition 5/19/2014    TIA (transient ischemic attack)     Traumatic brain injury Dec 2010    Type II or unspecified type diabetes mellitus with renal manifestations, uncontrolled(250.42)         Prior to Admission medications     Medication Sig Start Date End Date Taking? Authorizing Provider   ascorbic acid, vitamin C, (VITAMIN C) 100 MG tablet Take 100 mg by mouth once daily.   Yes Historical Provider   aspirin (ECOTRIN) 81 MG EC tablet Take 81 mg by mouth once daily.   Yes Historical Provider   atorvastatin (LIPITOR) 20 MG tablet Take 1 tablet (20 mg total) by mouth once daily. 5/1/23  Yes LOVE Dailey FNP   biotin 1 mg Cap Take 1 capsule by mouth once daily.   Yes Historical Provider   blood sugar diagnostic Strp Accuchek guide meter. To check BG 3  times daily, to use with insurance preferred meter, e 11.65 8/24/22  Yes LOVE Dailey FNP   blood-glucose meter kit Accuchek guide meter. To check BG 3  times daily, to use with insurance preferred meter, e 11.65 8/24/22 8/24/23 Yes LOVE Dailey FNP   dulaglutide (TRULICITY) 1.5 mg/0.5 mL pen injector Inject 1.5 mg into the skin every 7 days. 5/1/23  Yes LOVE Dailey FNP   ergocalciferol (ERGOCALCIFEROL) 50,000 unit Cap TAKE 1 CAPSULE EVERY 30    DAYS 6/6/23  Yes Rob Phillips MD   flash glucose sensor (FREESTYLE SIDNEY 14 DAY SENSOR) Kit Change every 14 days e 11.65 6/6/23  Yes LOVE Dailey FNP   insulin aspart U-100 (NOVOLOG U-100 INSULIN ASPART) 100 unit/mL injection Uses vgo 40, max daily 130 units, 12 vials per 90 days, 6 clicks with meals, correction scale 180-230+2, 231-280+4, 281-330+6, 331-380+8, >380+10. 5/1/23  Yes LOVE Dailey FNP   lancets Misc To check BG 3  times daily, to use with insurance preferred meter, e 11.65 8/10/21  Yes LOVE Dailey FNP   losartan-hydrochlorothiazide 50-12.5 mg (HYZAAR) 50-12.5 mg per tablet Take 1 tablet by mouth once daily. 5/1/23  Yes Irielle L. Stevens, APRN, FNP   metFORMIN (GLUCOPHAGE) 1000 MG tablet Take 1 tablet daily 5/2/23  Yes LOVE Dailey, MILAP   multivitamin capsule Take 1 capsule by mouth once daily.   Yes Historical Provider   omega-3s/dha/epa/fish oil/D3  "(VITAMIN-D + OMEGA-3 ORAL) Take by mouth.   Yes Historical Provider   sub-q insulin device, 30 unit (V-GO 30) Mariel Use daily. 5/1/23  Yes LOVE Dailey FNP   SYNTHROID 25 mcg tablet TAKE 1 TABLET DAILY no generic 6/15/23  Yes LOVE Dailey FNP   zinc gluconate 50 mg tablet Take 50 mg by mouth once daily.   Yes Historical Provider        Past medical history, surgical history, and family medical history reviewed and updated as appropriate.    Medications and allergies reviewed.     Objective:          Vitals:    07/10/23 1025   BP: 118/68   BP Location: Right arm   Patient Position: Sitting   Pulse: 88   SpO2: 96%   Weight: 78 kg (171 lb 15.3 oz)   Height: 5' 3" (1.6 m)     Body mass index is 30.46 kg/m².  Physical Exam  Constitutional:       Appearance: She is well-developed.   HENT:      Head: Normocephalic and atraumatic.   Eyes:      Extraocular Movements: Extraocular movements intact.   Cardiovascular:      Rate and Rhythm: Normal rate and regular rhythm.      Heart sounds: Normal heart sounds.   Pulmonary:      Effort: Pulmonary effort is normal. No respiratory distress.      Breath sounds: Normal breath sounds. No wheezing.   Abdominal:      General: Bowel sounds are normal. There is no distension.      Palpations: Abdomen is soft.      Tenderness: There is no abdominal tenderness.   Musculoskeletal:         General: No tenderness. Normal range of motion.      Cervical back: Normal range of motion.   Skin:     General: Skin is warm and dry.   Neurological:      Mental Status: She is alert and oriented to person, place, and time.      Cranial Nerves: No cranial nerve deficit.      Deep Tendon Reflexes: Reflexes are normal and symmetric.       Protective Sensation (w/ 10 gram monofilament):  Right: Intact  Left: Intact    Visual Inspection:  Normal -  Bilateral    Pedal Pulses:   Right: Present  Left: Present    Posterior Tibialis Pulses:   Right:Present  Left: Present      Lab Results "   Component Value Date    WBC 9.27 04/27/2023    HGB 12.7 04/27/2023    HCT 40.0 04/27/2023     04/27/2023    CHOL 150 04/27/2023    TRIG 114 04/27/2023    HDL 57 04/27/2023    ALT 15 04/27/2023    AST 21 04/27/2023     04/27/2023    K 4.3 04/27/2023     04/27/2023    CREATININE 0.8 04/27/2023    BUN 13 04/27/2023    CO2 30 (H) 04/27/2023    TSH 5.940 (H) 06/12/2023    INR 1.0 01/05/2019    HGBA1C 6.7 (H) 04/27/2023       Assessment:       1. Hypertension associated with diabetes    2. Hyperlipidemia associated with type 2 diabetes mellitus    3. Type 2 diabetes mellitus with diabetic polyneuropathy, with long-term current use of insulin    4. Hypothyroidism, acquired    5. Vitamin D deficiency disease          Plan:     Linda was seen today for follow-up.    Diagnoses and all orders for this visit:    Hypertension associated with diabetes  Comments:  controlled, no changes to current management    Hyperlipidemia associated with type 2 diabetes mellitus  Comments:  controlled, no changes to current management    Type 2 diabetes mellitus with diabetic polyneuropathy, with long-term current use of insulin  Comments:  a1c 6.7, controlled, no changes to current management    Hypothyroidism, acquired  Comments:  on synthroid 25, has been more compliant.   Orders:  -     TSH; Future    Vitamin D deficiency disease  Comments:  last vitamin D 60, adequate supplementation    Benign physical examination, no issues identified. Will obtain routine labwork and age appropriate health screenings.     Health maintenance reviewed with patient.     Follow up in about 6 months (around 1/10/2024).    Rob Phillips MD  Internal Medicine / Primary Care  [facility]  7/10/2023

## 2023-08-31 ENCOUNTER — PATIENT MESSAGE (OUTPATIENT)
Dept: INTERNAL MEDICINE | Facility: CLINIC | Age: 73
End: 2023-08-31
Payer: MEDICARE

## 2023-09-01 ENCOUNTER — PATIENT MESSAGE (OUTPATIENT)
Dept: INTERNAL MEDICINE | Facility: CLINIC | Age: 73
End: 2023-09-01
Payer: MEDICARE
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
## 2023-09-07 ENCOUNTER — OFFICE VISIT (OUTPATIENT)
Dept: INTERNAL MEDICINE | Facility: CLINIC | Age: 73
End: 2023-09-07
Payer: MEDICARE

## 2023-09-07 VITALS
HEIGHT: 63 IN | HEART RATE: 84 BPM | DIASTOLIC BLOOD PRESSURE: 76 MMHG | BODY MASS INDEX: 30.2 KG/M2 | OXYGEN SATURATION: 95 % | WEIGHT: 170.44 LBS | SYSTOLIC BLOOD PRESSURE: 128 MMHG

## 2023-09-07 DIAGNOSIS — E03.9 HYPOTHYROIDISM, ACQUIRED: Chronic | ICD-10-CM

## 2023-09-07 DIAGNOSIS — E11.42 TYPE 2 DIABETES MELLITUS WITH DIABETIC POLYNEUROPATHY, WITH LONG-TERM CURRENT USE OF INSULIN: Primary | Chronic | ICD-10-CM

## 2023-09-07 DIAGNOSIS — Z79.4 TYPE 2 DIABETES MELLITUS WITH DIABETIC POLYNEUROPATHY, WITH LONG-TERM CURRENT USE OF INSULIN: Primary | Chronic | ICD-10-CM

## 2023-09-07 DIAGNOSIS — I15.2 HYPERTENSION ASSOCIATED WITH DIABETES: Chronic | ICD-10-CM

## 2023-09-07 DIAGNOSIS — E11.42 DIABETIC PERIPHERAL NEUROPATHY ASSOCIATED WITH TYPE 2 DIABETES MELLITUS: Chronic | ICD-10-CM

## 2023-09-07 DIAGNOSIS — E11.69 HYPERLIPIDEMIA ASSOCIATED WITH TYPE 2 DIABETES MELLITUS: Chronic | ICD-10-CM

## 2023-09-07 DIAGNOSIS — E78.5 HYPERLIPIDEMIA ASSOCIATED WITH TYPE 2 DIABETES MELLITUS: Chronic | ICD-10-CM

## 2023-09-07 DIAGNOSIS — E11.59 HYPERTENSION ASSOCIATED WITH DIABETES: Chronic | ICD-10-CM

## 2023-09-07 PROCEDURE — 99214 OFFICE O/P EST MOD 30 MIN: CPT | Mod: S$PBB,,, | Performed by: NURSE PRACTITIONER

## 2023-09-07 PROCEDURE — 99999 PR PBB SHADOW E&M-EST. PATIENT-LVL IV: ICD-10-PCS | Mod: PBBFAC,,, | Performed by: NURSE PRACTITIONER

## 2023-09-07 PROCEDURE — 99999 PR PBB SHADOW E&M-EST. PATIENT-LVL IV: CPT | Mod: PBBFAC,,, | Performed by: NURSE PRACTITIONER

## 2023-09-07 PROCEDURE — 99214 PR OFFICE/OUTPT VISIT, EST, LEVL IV, 30-39 MIN: ICD-10-PCS | Mod: S$PBB,,, | Performed by: NURSE PRACTITIONER

## 2023-09-07 PROCEDURE — 99214 OFFICE O/P EST MOD 30 MIN: CPT | Mod: PBBFAC | Performed by: NURSE PRACTITIONER

## 2023-09-07 RX ORDER — SUB-Q INSULIN DEVICE, 40 UNIT
EACH MISCELLANEOUS
Qty: 90 EACH | Refills: 3 | Status: SHIPPED | OUTPATIENT
Start: 2023-09-07 | End: 2024-01-25 | Stop reason: SDUPTHER

## 2023-09-07 RX ORDER — LEVOTHYROXINE SODIUM 25 UG/1
TABLET ORAL
Qty: 90 TABLET | Refills: 2
Start: 2023-09-07 | End: 2024-01-05 | Stop reason: SDUPTHER

## 2023-09-07 NOTE — PATIENT INSTRUCTIONS
Follow up in 4 months w/Irielle   A1c tsh prior -4 months   Tsh -- in 6 weeks     HOLD synthroid on Saturday, Sunday   Take synthroid 25 mcg daily Monday thru Friday   Lab Results   Component Value Date    HGBA1C 6.9 (H) 08/31/2023     Goal less than 7%     [email or web address]  Eat fit fanny  Ticket ABCpal fanny  [email or web address]      Goal  no higher than 200       Vgo 30, 5-6 clicks   Metformin 1000 mg in am   Trulicity 1.5 mg weekly   Novolog vials  Click before meals

## 2023-10-16 ENCOUNTER — PATIENT MESSAGE (OUTPATIENT)
Dept: INTERNAL MEDICINE | Facility: CLINIC | Age: 73
End: 2023-10-16
Payer: MEDICARE

## 2023-10-26 ENCOUNTER — PATIENT MESSAGE (OUTPATIENT)
Dept: INTERNAL MEDICINE | Facility: CLINIC | Age: 73
End: 2023-10-26
Payer: MEDICARE
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
## 2023-12-01 ENCOUNTER — TELEPHONE (OUTPATIENT)
Dept: INTERNAL MEDICINE | Facility: CLINIC | Age: 73
End: 2023-12-01
Payer: MEDICARE

## 2023-12-01 NOTE — TELEPHONE ENCOUNTER
Pt came by this morning w/ a letter to ask for assistance w/ a decision re: medication.  She is in Novolog w/ V-go and Freestyle Jerald kit.  She has a Medicare Gap plan currently but may have to move to [place].  Her  is recovering from an illness also and doesn't know where to turn. She has to make a decision re: Insurance plan before Dec. 7th.

## 2023-12-01 NOTE — TELEPHONE ENCOUNTER
Notified pt of message from Provider re: insulin, pump and monitor...         Pt stated she believes she know what she has to do now (possibly change insurer) because she would like to continue using the FS Jerald and the V-go products.  She has info to discuss w/ the [facility] and [employer] groups.  Once the insurance clears we will have pharmacy info and med clarification.       Sorry to hear of this-  Fiasp is preferred over novolog with current plan.    Fiasp has been used, it is a good insulin if you forget to give novolog 15 mins before meals.    This is fine moving forward, but question is if vgo is not covered- is omnipod pump covered?             If not she will have to do lantus or toujeo nightly for 24 hour insulin.    If so which pharmacy to send these to?    Also if jerald is not covered, is dexcom g6 or  g7 covered?    Several questions in one message fyi -spread out each response.

## 2023-12-07 ENCOUNTER — OFFICE VISIT (OUTPATIENT)
Dept: DERMATOLOGY | Facility: CLINIC | Age: 73
End: 2023-12-07
Payer: MEDICARE

## 2023-12-07 DIAGNOSIS — D18.01 CHERRY ANGIOMA: ICD-10-CM

## 2023-12-07 DIAGNOSIS — L73.8 SEBACEOUS GLAND HYPERPLASIA: ICD-10-CM

## 2023-12-07 DIAGNOSIS — D22.9 NEVUS: ICD-10-CM

## 2023-12-07 DIAGNOSIS — L81.4 LENTIGO: Primary | ICD-10-CM

## 2023-12-07 DIAGNOSIS — L82.1 SK (SEBORRHEIC KERATOSIS): ICD-10-CM

## 2023-12-07 PROCEDURE — 99213 PR OFFICE/OUTPT VISIT, EST, LEVL III, 20-29 MIN: ICD-10-PCS | Mod: S$PBB,,, | Performed by: DERMATOLOGY

## 2023-12-07 PROCEDURE — 99213 OFFICE O/P EST LOW 20 MIN: CPT | Mod: PBBFAC,PO | Performed by: DERMATOLOGY

## 2023-12-07 PROCEDURE — 99213 OFFICE O/P EST LOW 20 MIN: CPT | Mod: S$PBB,,, | Performed by: DERMATOLOGY

## 2023-12-07 PROCEDURE — 99999 PR PBB SHADOW E&M-EST. PATIENT-LVL III: CPT | Mod: PBBFAC,,, | Performed by: DERMATOLOGY

## 2023-12-07 PROCEDURE — 99999 PR PBB SHADOW E&M-EST. PATIENT-LVL III: ICD-10-PCS | Mod: PBBFAC,,, | Performed by: DERMATOLOGY

## 2023-12-07 NOTE — PATIENT INSTRUCTIONS
We would like to see you back in the clinic in 12 months.  You will be able to schedule this appointment by calling or by using your [facility] portal 3 months before this time. Because our schedule fills so quickly, please set a reminder in your phone or on your calendar to schedule 3 months before you are due to come in so that we can see you in a timely fashion.  You should also receive a reminder from us in the mail. This will help us ensure we can continue to provide excellent healthcare for you. Thank you.     fair minus

## 2023-12-07 NOTE — PROGRESS NOTES
"  Subjective:      Patient ID:  Linda Fong is a 73 y.o. female who presents for   Chief Complaint   Patient presents with    Skin Check     ubse     Patient is here today for a "UBSE" check.   Pt has a history of normal sun exposure in the past.   Pt recalls several blistering sunburns in the past- no  Pt has history of tanning bed use- no  Pt has had moles removed in the past- no  Pt has history of melanoma in first degree relatives- no      Pt denies development of any new or changing lesions.          Review of Systems   Skin:  Negative for daily sunscreen use, activity-related sunscreen use and tendency to form keloidal scars.   Hematologic/Lymphatic: Does not bruise/bleed easily.       Objective:   Physical Exam   Constitutional: She appears well-developed and well-nourished. No distress.   Neurological: She is alert and oriented to person, place, and time. She is not disoriented.   Psychiatric: She has a normal mood and affect.   Skin:   Areas Examined (abnormalities noted in diagram):   Scalp / Hair Palpated and Inspected  Head / Face Inspection Performed  Neck Inspection Performed  Chest / Axilla Inspection Performed  Abdomen Inspection Performed  Back Inspection Performed  RUE Inspected  LUE Inspection Performed                 Diagram Legend     Erythematous scaling macule/papule c/w actinic keratosis       Vascular papule c/w angioma      Pigmented verrucoid papule/plaque c/w seborrheic keratosis      Yellow umbilicated papule c/w sebaceous hyperplasia      Irregularly shaped tan macule c/w lentigo     1-2 mm smooth white papules consistent with Milia      Movable subcutaneous cyst with punctum c/w epidermal inclusion cyst      Subcutaneous movable cyst c/w pilar cyst      Firm pink to brown papule c/w dermatofibroma      Pedunculated fleshy papule(s) c/w skin tag(s)      Evenly pigmented macule c/w junctional nevus     Mildly variegated pigmented, slightly irregular-bordered macule c/w mildly " atypical nevus      Flesh colored to evenly pigmented papule c/w intradermal nevus       Pink pearly papule/plaque c/w basal cell carcinoma      Erythematous hyperkeratotic cursted plaque c/w SCC      Surgical scar with no sign of skin cancer recurrence      Open and closed comedones      Inflammatory papules and pustules      Verrucoid papule consistent consistent with wart     Erythematous eczematous patches and plaques     Dystrophic onycholytic nail with subungual debris c/w onychomycosis     Umbilicated papule    Erythematous-base heme-crusted tan verrucoid plaque consistent with inflamed seborrheic keratosis     Erythematous Silvery Scaling Plaque c/w Psoriasis     See annotation      Assessment / Plan:        Lentigo  This is a benign hyperpigmented sun induced lesion. Recommend daily sun protection/avoidance and use of at least SPF 30, broad spectrum sunscreen (OTC drug) will reduce the number of new lesions. Treatment of these benign lesions are considered cosmetic.  The nature of sun-induced photo-aging and skin cancers is discussed.  Sun avoidance, protective clothing, and the use of 30-SPF sunscreens is advised. Observe closely for skin damage/changes, and call if such occurs.    Nevus  Discussed ABCDE's of nevi.  Monitor for new mole or moles that are becoming bigger, darker, irritated, or developing irregular borders. Brochure provided. Instructed patient to observe lesion(s) for changes and follow up in clinic if changes are noted. Patient to monitor skin at home for new or changing lesions.     SK (seborrheic keratosis)  These are benign inherited growths without a malignant potential. Reassurance given to patient. No treatment is necessary.     Cherry angioma  These are benign vascular lesions that are inherited.  Treatment is not necessary.    Sebaceous gland hyperplasia  This is a common condition representing benign enlargement of the sebaceous lobule. It typically occurs in adulthood.  Reassurance given to patient.       Upper body skin examination performed today including at least 6 points as noted in physical examination. No lesions suspicious for malignancy noted.    Recommend daily sun protection/avoidance and use of at least SPF 30, broad spectrum sunscreen (OTC drug).                   Follow up in about 1 year (around 12/7/2024) for UBSE.

## 2024-01-05 RX ORDER — LEVOTHYROXINE SODIUM 25 UG/1
TABLET ORAL
Qty: 90 TABLET | Refills: 2 | Status: SHIPPED | OUTPATIENT
Start: 2024-01-05 | End: 2024-01-19

## 2024-01-05 NOTE — TELEPHONE ENCOUNTER
----- Message from Jessy Azul sent at 1/5/2024  2:50 PM CST -----  Contact: Linda  Requesting an RX refill or new RX.  Is this a refill or new RX: refill   RX name and strength (copy/paste from chart):  Synthroid   Is this a 30 day or 90 day RX:   Pharmacy name and phone # (copy/paste from chart):  [facility] in [place]   The doctors have asked that we provide their patients with the following 2 reminders -- prescription refills can take up to 72 hours, and a friendly reminder that in the future you can use your [facility] account to request refills

## 2024-01-11 ENCOUNTER — OFFICE VISIT (OUTPATIENT)
Dept: OPTOMETRY | Facility: CLINIC | Age: 74
End: 2024-01-11
Payer: MEDICARE

## 2024-01-11 DIAGNOSIS — E11.9 TYPE 2 DIABETES MELLITUS WITHOUT RETINOPATHY: Primary | ICD-10-CM

## 2024-01-11 DIAGNOSIS — H52.03 HYPEROPIA WITH ASTIGMATISM AND PRESBYOPIA, BILATERAL: ICD-10-CM

## 2024-01-11 DIAGNOSIS — E11.36 TYPE 2 DIABETES MELLITUS WITH CATARACT: ICD-10-CM

## 2024-01-11 DIAGNOSIS — H52.4 HYPEROPIA WITH ASTIGMATISM AND PRESBYOPIA, BILATERAL: ICD-10-CM

## 2024-01-11 DIAGNOSIS — H52.203 HYPEROPIA WITH ASTIGMATISM AND PRESBYOPIA, BILATERAL: ICD-10-CM

## 2024-01-11 DIAGNOSIS — H25.13 SENILE NUCLEAR SCLEROSIS, BILATERAL: ICD-10-CM

## 2024-01-11 PROCEDURE — 92014 COMPRE OPH EXAM EST PT 1/>: CPT | Mod: S$PBB,,, | Performed by: OPTOMETRIST

## 2024-01-11 PROCEDURE — 99999 PR PBB SHADOW E&M-EST. PATIENT-LVL I: CPT | Mod: PBBFAC,,, | Performed by: OPTOMETRIST

## 2024-01-11 PROCEDURE — 92015 DETERMINE REFRACTIVE STATE: CPT | Mod: ,,, | Performed by: OPTOMETRIST

## 2024-01-11 PROCEDURE — 99211 OFF/OP EST MAY X REQ PHY/QHP: CPT | Mod: PBBFAC,PO | Performed by: OPTOMETRIST

## 2024-01-11 NOTE — PROGRESS NOTES
ESSENCE    KAMI: 06/23  Chief complaint (CC): Patient is back early because she has noticed a   change in her vision.  Patient states her vision feels off and she is   constantly trying to adjust them to see better. Patient has noticed that   her vision is not as clear as it was before in the past few months.  Glasses? + 6 months  Contacts? -  H/o eye surgery, injections or laser: -  H/o eye injury: -  Known eye conditions? See above  Family h/o eye conditions? -  Eye gtts? -      (-) Flashes (-)  Floaters (-) Mucous   (-)  Tearing (-) Itching (-) Burning   (-) Headaches (-) Eye Pain/discomfort (-) Irritation   (-)  Redness (-) Double vision (-) Blurry vision    Diabetic? +BS 94 this morning  A1c? Hemoglobin A1C       Date                     Value               Ref Range             Status                01/10/2024               6.7 (H)             4.0 - 5.6 %           Final                 08/31/2023               6.9 (H)             4.0 - 5.6 %           Final                 04/27/2023               6.7 (H)             4.0 - 5.6 %           Final                  Last edited by Viviane Santana on 1/11/2024 10:05 AM.            Assessment /Plan     For exam results, see Encounter Report.      Type 2 diabetes mellitus without retinopathy  BS control. No signs of diabetic retinopathy. Monitor with annual exam.     Senile nuclear sclerosis, bilateral  Type 2 diabetes mellitus with cataract  Nuclear sclerotic cataract - not visually significant. Observe.    Hyperopia with astigmatism and presbyopia, bilateral  SRx released to patient. Patient educated on lens options. Normal ocular health. RTC 1 year for routine exam.

## 2024-01-12 ENCOUNTER — OFFICE VISIT (OUTPATIENT)
Dept: INTERNAL MEDICINE | Facility: CLINIC | Age: 74
End: 2024-01-12
Payer: MEDICARE

## 2024-01-12 VITALS
DIASTOLIC BLOOD PRESSURE: 66 MMHG | OXYGEN SATURATION: 98 % | SYSTOLIC BLOOD PRESSURE: 116 MMHG | HEART RATE: 83 BPM | HEIGHT: 63 IN | BODY MASS INDEX: 30.54 KG/M2 | WEIGHT: 172.38 LBS

## 2024-01-12 DIAGNOSIS — I70.0 AORTIC ATHEROSCLEROSIS: ICD-10-CM

## 2024-01-12 DIAGNOSIS — E78.5 HYPERLIPIDEMIA ASSOCIATED WITH TYPE 2 DIABETES MELLITUS: ICD-10-CM
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
## 2024-01-12 DIAGNOSIS — Z00.00 ENCOUNTER FOR ANNUAL PHYSICAL EXAM: Primary | ICD-10-CM

## 2024-01-12 DIAGNOSIS — E11.59 HYPERTENSION ASSOCIATED WITH DIABETES: ICD-10-CM

## 2024-01-12 DIAGNOSIS — I15.2 HYPERTENSION ASSOCIATED WITH DIABETES: ICD-10-CM

## 2024-01-12 DIAGNOSIS — E11.69 HYPERLIPIDEMIA ASSOCIATED WITH TYPE 2 DIABETES MELLITUS: ICD-10-CM

## 2024-01-12 DIAGNOSIS — Z12.31 BREAST CANCER SCREENING BY MAMMOGRAM: ICD-10-CM

## 2024-01-12 DIAGNOSIS — Z79.4 TYPE 2 DIABETES MELLITUS WITH DIABETIC POLYNEUROPATHY, WITH LONG-TERM CURRENT USE OF INSULIN: ICD-10-CM

## 2024-01-12 DIAGNOSIS — E11.42 TYPE 2 DIABETES MELLITUS WITH DIABETIC POLYNEUROPATHY, WITH LONG-TERM CURRENT USE OF INSULIN: ICD-10-CM

## 2024-01-12 DIAGNOSIS — E03.9 HYPOTHYROIDISM, ACQUIRED: ICD-10-CM

## 2024-01-12 DIAGNOSIS — Z86.010 HISTORY OF COLON POLYPS: ICD-10-CM

## 2024-01-12 PROCEDURE — 99999 PR PBB SHADOW E&M-EST. PATIENT-LVL V: CPT | Mod: PBBFAC,,, | Performed by: INTERNAL MEDICINE

## 2024-01-12 PROCEDURE — 99397 PER PM REEVAL EST PAT 65+ YR: CPT | Mod: S$PBB,GZ,, | Performed by: INTERNAL MEDICINE

## 2024-01-12 PROCEDURE — 99215 OFFICE O/P EST HI 40 MIN: CPT | Mod: PBBFAC | Performed by: INTERNAL MEDICINE

## 2024-01-12 NOTE — PATIENT INSTRUCTIONS
Fasting labwork in 3 months  Schedule mammogram 2/9/2024  Return to clinic in 6 months or sooner if needed.

## 2024-01-12 NOTE — PROGRESS NOTES
Subjective:       Patient ID: Linda Fong is a 73 y.o. female.    Chief Complaint: Follow-up      HPI  Linda Fong is a 73 y.o. year old female with  t2DM, HTN, HLD, hypothyroidism, vit D deficiency, environmental and seasonal allergies, occular migraines, osteopenia, aortic atherosclerosis presents for follow up. Fell down bleachers while attending [employer] competition on 10/25/2023. Did not seek medical attention. No head trauma. Did injure her left shoulder, left chest wall and left hip. Took about 4 weeks for her to fully recover. No residual pain / limitations from that fall.     Review of Systems   Constitutional:  Negative for activity change, appetite change, fatigue, fever and unexpected weight change.   HENT:  Negative for congestion, hearing loss, postnasal drip, sneezing, sore throat, trouble swallowing and voice change.    Eyes:  Negative for pain and discharge.   Respiratory:  Negative for cough, choking, chest tightness, shortness of breath and wheezing.    Cardiovascular:  Negative for chest pain, palpitations and leg swelling.   Gastrointestinal:  Negative for abdominal distention, abdominal pain, blood in stool, constipation, diarrhea, nausea and vomiting.   Endocrine: Negative for polydipsia and polyuria.   Genitourinary:  Negative for difficulty urinating and flank pain.   Musculoskeletal:  Negative for arthralgias, back pain, joint swelling, myalgias and neck pain.   Skin:  Negative for rash.   Neurological:  Negative for dizziness, tremors, seizures, weakness, numbness and headaches.   Psychiatric/Behavioral:  Negative for agitation. The patient is not nervous/anxious.          Past Medical History:   Diagnosis Date    Altered mental status 1/5/2019    CVA (cerebral vascular accident) 1/5/2019    Diabetic nephropathy     DJD (degenerative joint disease)     Goiter     HTN (hypertension)     Hyperlipidemia     Intracranial bleeding     12/10    Osteopenia     PN (peripheral  neuropathy)     Spell of altered cognition 5/19/2014    TIA (transient ischemic attack)     Traumatic brain injury Dec 2010    Type II or unspecified type diabetes mellitus with renal manifestations, uncontrolled(250.42)         Prior to Admission medications    Medication Sig Start Date End Date Taking? Authorizing Provider   ascorbic acid, vitamin C, (VITAMIN C) 100 MG tablet Take 100 mg by mouth once daily.   Yes Provider, Historical   aspirin (ECOTRIN) 81 MG EC tablet Take 81 mg by mouth once daily.   Yes Provider, Historical   atorvastatin (LIPITOR) 20 MG tablet Take 1 tablet (20 mg total) by mouth once daily. 5/1/23  Yes J Luis Stevens APRN, JEANNETTE   biotin 1 mg Cap Take 1 capsule by mouth once daily.   Yes Provider, Historical   blood sugar diagnostic Strp Accuchek guide meter. To check BG 3  times daily, to use with insurance preferred meter, e 11.65 8/24/22  Yes J Luis Stevens APRN, MILAP   dulaglutide (TRULICITY) 1.5 mg/0.5 mL pen injector Inject 1.5 mg into the skin every 7 days. 5/1/23  Yes J Luis Stevens APRN, FNP   ergocalciferol (ERGOCALCIFEROL) 50,000 unit Cap TAKE 1 CAPSULE EVERY 30    DAYS 6/6/23  Yes Rob Phillips MD   flash glucose sensor (FREESTYLE SIDNEY 14 DAY SENSOR) Kit Change every 14 days e 11.65 6/6/23  Yes J Luis Stevens APRN, FNP   insulin aspart U-100 (NOVOLOG U-100 INSULIN ASPART) 100 unit/mL injection Uses vgo 40, max daily 130 units, 12 vials per 90 days, 6 clicks with meals, correction scale 180-230+2, 231-280+4, 281-330+6, 331-380+8, >380+10. 5/1/23  Yes J Luis Stevens APRN, JEANNETTE   lancets Misc To check BG 3  times daily, to use with insurance preferred meter, e 11.65 8/10/21  Yes J Luis Stevens APRN, FNP   losartan-hydrochlorothiazide 50-12.5 mg (HYZAAR) 50-12.5 mg per tablet Take 1 tablet by mouth once daily. 5/1/23  Yes J Luis Stevens APRN, FNP   metFORMIN (GLUCOPHAGE) 1000 MG tablet Take 1 tablet daily 5/2/23  Yes J Luis Stevens APRN, MILAP   multivitamin  "capsule Take 1 capsule by mouth once daily.   Yes Provider, Historical   omega-3s/dha/epa/fish oil/D3 (VITAMIN-D + OMEGA-3 ORAL) Take by mouth.   Yes Provider, Historical   sub-q insulin device, 30 unit (V-GO 30) Mariel Use daily. 9/7/23  Yes J Luis Stevens APRN, FNP   SYNTHROID 25 mcg tablet TAKE 1 TABLET Monday through Friday, hold on Saturday and Sunday. 1/5/24  Yes J Luis Stevens APRN, FNP   zinc gluconate 50 mg tablet Take 50 mg by mouth once daily.   Yes Provider, Historical   blood-glucose meter kit Accuchek guide meter. To check BG 3  times daily, to use with insurance preferred meter, e 11.65 8/24/22 9/7/23  J Luis Stevens APRN, FNP        Past medical history, surgical history, and family medical history reviewed and updated as appropriate.    Medications and allergies reviewed.     Objective:          Vitals:    01/12/24 1117   BP: 116/66   BP Location: Right arm   Patient Position: Sitting   Pulse: 83   SpO2: 98%   Weight: 78.2 kg (172 lb 6.4 oz)   Height: 5' 3" (1.6 m)     Body mass index is 30.54 kg/m².  Physical Exam  Constitutional:       Appearance: She is well-developed.   HENT:      Head: Normocephalic and atraumatic.   Eyes:      Extraocular Movements: Extraocular movements intact.   Cardiovascular:      Rate and Rhythm: Normal rate and regular rhythm.      Heart sounds: Normal heart sounds.   Pulmonary:      Effort: Pulmonary effort is normal. No respiratory distress.      Breath sounds: Normal breath sounds. No wheezing.   Abdominal:      General: There is no distension.      Palpations: Abdomen is soft.      Tenderness: There is no abdominal tenderness.   Musculoskeletal:         General: No tenderness. Normal range of motion.      Cervical back: Normal range of motion.   Skin:     General: Skin is warm and dry.   Neurological:      Mental Status: She is alert and oriented to person, place, and time.      Cranial Nerves: No cranial nerve deficit.      Deep Tendon Reflexes: Reflexes " are normal and symmetric.         Lab Results   Component Value Date    WBC 9.27 04/27/2023    HGB 12.7 04/27/2023    HCT 40.0 04/27/2023     04/27/2023    CHOL 150 04/27/2023    TRIG 114 04/27/2023    HDL 57 04/27/2023    ALT 15 04/27/2023    AST 21 04/27/2023     04/27/2023    K 4.3 04/27/2023     04/27/2023    CREATININE 0.8 04/27/2023    BUN 13 04/27/2023    CO2 30 (H) 04/27/2023    TSH 2.070 01/10/2024    INR 1.0 01/05/2019    HGBA1C 6.7 (H) 01/10/2024       Assessment:       1. Encounter for annual physical exam    2. Fall down stairs, initial encounter    3. Type 2 diabetes mellitus with diabetic polyneuropathy, with long-term current use of insulin    4. History of colon polyps    5. Hypertension associated with diabetes    6. Hyperlipidemia associated with type 2 diabetes mellitus    7. Hypothyroidism, acquired    8. Aortic atherosclerosis    9. Breast cancer screening by mammogram          Plan:     Linda was seen today for follow-up.    Diagnoses and all orders for this visit:    Encounter for annual physical exam    Fall down stairs, initial encounter  Comments:  fall on 10/25/2023 - toppled over bleachers onto left side. Left shoulder, chest wall, left hip pain lasted 4 weeks. now back to baseline. no head injury    Type 2 diabetes mellitus with diabetic polyneuropathy, with long-term current use of insulin  Comments:  using Vgo 30, 5 clicks with each meal. last a1c 6.7. Follows with J Luis Stevens NP.  Orders:  -     Ambulatory referral/consult to Endo Procedure ; Future  -     HEMOGLOBIN A1C; Future    History of colon polyps    Hypertension associated with diabetes  -     CBC W/ AUTO DIFFERENTIAL; Future  -     COMPREHENSIVE METABOLIC PANEL; Future    Hyperlipidemia associated with type 2 diabetes mellitus  -     Lipid Panel; Future    Hypothyroidism, acquired    Aortic atherosclerosis    Breast cancer screening by mammogram  -     Mammo Digital Screening Bilat;  Future    Benign physical examination, no issues identified. Will obtain routine labwork and age appropriate health screenings.     Health maintenance reviewed with patient.     Follow up in about 6 months (around 7/12/2024).    Rob Phillips MD  Internal Medicine / Primary Care  [facility]  1/12/2024

## 2024-01-15 PROBLEM — E66.9 OBESITY (BMI 30.0-34.9): Status: ACTIVE | Noted: 2024-01-15

## 2024-01-15 PROBLEM — E66.811 OBESITY (BMI 30.0-34.9): Status: ACTIVE | Noted: 2024-01-15

## 2024-01-17 ENCOUNTER — TELEPHONE (OUTPATIENT)
Dept: INTERNAL MEDICINE | Facility: CLINIC | Age: 74
End: 2024-01-17
Payer: MEDICARE

## 2024-01-19 RX ORDER — LEVOTHYROXINE SODIUM 25 UG/1
TABLET ORAL
Qty: 90 TABLET | Refills: 2 | Status: SHIPPED | OUTPATIENT
Start: 2024-01-19 | End: 2024-02-27 | Stop reason: SDUPTHER

## 2024-01-25 ENCOUNTER — PATIENT MESSAGE (OUTPATIENT)
Dept: INTERNAL MEDICINE | Facility: CLINIC | Age: 74
End: 2024-01-25
Payer: MEDICARE

## 2024-01-25 DIAGNOSIS — Z79.4 TYPE 2 DIABETES MELLITUS WITH DIABETIC POLYNEUROPATHY, WITH LONG-TERM CURRENT USE OF INSULIN: Chronic | ICD-10-CM

## 2024-01-25 DIAGNOSIS — E11.42 TYPE 2 DIABETES MELLITUS WITH DIABETIC POLYNEUROPATHY, WITH LONG-TERM CURRENT USE OF INSULIN: Chronic | ICD-10-CM

## 2024-01-25 RX ORDER — DULAGLUTIDE 1.5 MG/.5ML
1.5 INJECTION, SOLUTION SUBCUTANEOUS
Qty: 12 PEN | Refills: 2 | Status: SHIPPED | OUTPATIENT
Start: 2024-01-25

## 2024-01-25 RX ORDER — INSULIN ASPART 100 [IU]/ML
INJECTION, SOLUTION INTRAVENOUS; SUBCUTANEOUS
Qty: 120 ML | Refills: 3 | Status: SHIPPED | OUTPATIENT
Start: 2024-01-25

## 2024-01-25 RX ORDER — SUB-Q INSULIN DEVICE, 40 UNIT
EACH MISCELLANEOUS
Qty: 90 EACH | Refills: 3 | Status: SHIPPED | OUTPATIENT
Start: 2024-01-25

## 2024-01-25 RX ORDER — SUB-Q INSULIN DEVICE, 40 UNIT
EACH MISCELLANEOUS
Qty: 30 EACH | Refills: 3 | Status: SHIPPED | OUTPATIENT
Start: 2024-01-25

## 2024-01-26 ENCOUNTER — TELEPHONE (OUTPATIENT)
Dept: INTERNAL MEDICINE | Facility: CLINIC | Age: 74
End: 2024-01-26
Payer: MEDICARE

## 2024-01-26 NOTE — TELEPHONE ENCOUNTER
----- Message from Zuleika Dickinson sent at 1/26/2024 11:17 AM CST -----  Contact: [facility] Clin[facility] Review [facility]/Misti/[phone] reference#[id number]  Pharmacy is calling to clarify an RX.  RX name:  insulin aspart U-100 (NOVOLOG U-100 INSULIN ASPART) 100 unit/mL injection  What do they need to clarify:  clinical questions that need to be answered   Comments:
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
## 2024-01-31 ENCOUNTER — OFFICE VISIT (OUTPATIENT)
Dept: INTERNAL MEDICINE | Facility: CLINIC | Age: 74
End: 2024-01-31
Payer: MEDICARE

## 2024-01-31 ENCOUNTER — TELEPHONE (OUTPATIENT)
Dept: DIABETES | Facility: CLINIC | Age: 74
End: 2024-01-31
Payer: MEDICARE

## 2024-01-31 VITALS
OXYGEN SATURATION: 97 % | HEART RATE: 74 BPM | HEIGHT: 63 IN | SYSTOLIC BLOOD PRESSURE: 158 MMHG | BODY MASS INDEX: 30.94 KG/M2 | WEIGHT: 174.63 LBS | DIASTOLIC BLOOD PRESSURE: 80 MMHG

## 2024-01-31 DIAGNOSIS — E11.59 HYPERTENSION ASSOCIATED WITH DIABETES: Chronic | ICD-10-CM

## 2024-01-31 DIAGNOSIS — E11.69 HYPERLIPIDEMIA ASSOCIATED WITH TYPE 2 DIABETES MELLITUS: Chronic | ICD-10-CM

## 2024-01-31 DIAGNOSIS — E03.9 HYPOTHYROIDISM, ACQUIRED: Chronic | ICD-10-CM

## 2024-01-31 DIAGNOSIS — E66.9 OBESITY (BMI 30.0-34.9): ICD-10-CM

## 2024-01-31 DIAGNOSIS — Z79.4 TYPE 2 DIABETES MELLITUS WITH DIABETIC POLYNEUROPATHY, WITH LONG-TERM CURRENT USE OF INSULIN: Primary | Chronic | ICD-10-CM

## 2024-01-31 DIAGNOSIS — E78.5 HYPERLIPIDEMIA ASSOCIATED WITH TYPE 2 DIABETES MELLITUS: Chronic | ICD-10-CM

## 2024-01-31 DIAGNOSIS — I15.2 HYPERTENSION ASSOCIATED WITH DIABETES: Chronic | ICD-10-CM

## 2024-01-31 DIAGNOSIS — E11.42 TYPE 2 DIABETES MELLITUS WITH DIABETIC POLYNEUROPATHY, WITH LONG-TERM CURRENT USE OF INSULIN: Primary | Chronic | ICD-10-CM

## 2024-01-31 PROCEDURE — 99999 PR PBB SHADOW E&M-EST. PATIENT-LVL V: CPT | Mod: PBBFAC,,, | Performed by: NURSE PRACTITIONER

## 2024-01-31 PROCEDURE — 99215 OFFICE O/P EST HI 40 MIN: CPT | Mod: PBBFAC | Performed by: NURSE PRACTITIONER

## 2024-01-31 PROCEDURE — 99214 OFFICE O/P EST MOD 30 MIN: CPT | Mod: S$PBB,,, | Performed by: NURSE PRACTITIONER

## 2024-01-31 PROCEDURE — 95251 CONT GLUC MNTR ANALYSIS I&R: CPT | Mod: ,,, | Performed by: NURSE PRACTITIONER

## 2024-01-31 NOTE — PATIENT INSTRUCTIONS
Follow up in 4 months w/Irielle  A1c prior -4 months   YUMIKO Jade 2024     Lab Results   Component Value Date    HGBA1C 6.7 (H) 01/10/2024     Goal less than 7%    Vgo 30   Trulicity 1.5 mg weekly  Metformin 1000 mg twice a day   5 clicks with breakfast and lunc  4 clicks with dinner  1 click with snack   Cut back by 2 clicks if sugar is < 100     [email or web address]  Eat fit fanny  [employer] fanny  [email or web address]    [employer] fanny

## 2024-01-31 NOTE — Clinical Note
Needs appt in feb/march Having cost issues with vgo  Omnipod dash --- kit trial-not sure if this will be expensive as well Vgo 30 -1 month - $280/juan miguel 90 may be cheaper If not pt will have to go via mdi

## 2024-02-02 ENCOUNTER — TELEPHONE (OUTPATIENT)
Dept: INTERNAL MEDICINE | Facility: CLINIC | Age: 74
End: 2024-02-02
Payer: MEDICARE

## 2024-02-02 NOTE — TELEPHONE ENCOUNTER
Lm for pt to contact [facility] directly.      ----- Message from LOVE Dailey, JEANNETTE sent at 1/31/2024 10:29 AM CST -----  Regarding: vgo 30 -[facility]!  Pt needs price of vgo 30 via [facility]  Keep me posted,  J Luis

## 2024-02-07 ENCOUNTER — TELEPHONE (OUTPATIENT)
Dept: ENDOSCOPY | Facility: HOSPITAL | Age: 74
End: 2024-02-07

## 2024-02-07 ENCOUNTER — CLINICAL SUPPORT (OUTPATIENT)
Dept: ENDOSCOPY | Facility: HOSPITAL | Age: 74
End: 2024-02-07
Attending: INTERNAL MEDICINE
Payer: MEDICARE

## 2024-02-07 DIAGNOSIS — Z12.11 SPECIAL SCREENING FOR MALIGNANT NEOPLASMS, COLON: Primary | ICD-10-CM

## 2024-02-07 DIAGNOSIS — Z79.4 TYPE 2 DIABETES MELLITUS WITH DIABETIC POLYNEUROPATHY, WITH LONG-TERM CURRENT USE OF INSULIN: ICD-10-CM

## 2024-02-07 DIAGNOSIS — E11.42 TYPE 2 DIABETES MELLITUS WITH DIABETIC POLYNEUROPATHY, WITH LONG-TERM CURRENT USE OF INSULIN: ICD-10-CM

## 2024-02-07 DIAGNOSIS — Z86.010 HISTORY OF COLON POLYPS: ICD-10-CM

## 2024-02-07 RX ORDER — POLYETHYLENE GLYCOL 3350, SODIUM SULFATE ANHYDROUS, SODIUM BICARBONATE, SODIUM CHLORIDE, POTASSIUM CHLORIDE 236; 22.74; 6.74; 5.86; 2.97 G/4L; G/4L; G/4L; G/4L; G/4L
4 POWDER, FOR SOLUTION ORAL ONCE
Qty: 4000 ML | Refills: 0 | Status: SHIPPED | OUTPATIENT
Start: 2024-02-07 | End: 2024-02-07

## 2024-02-07 NOTE — TELEPHONE ENCOUNTER
Spoke to patient to schedule procedure(s) Colonoscopy       Physician to perform procedure(s) Dr. ASHLI Crum  Date of Procedure (s) 3/13/24  Arrival Time 9:10 AM  Time of Procedure(s) 10:10 AM   Location of Procedure(s) [place] 4th Floor  Type of Rx Prep sent to patient: PEG  Instructions provided to [facility] via [facility]    Patient was informed on the following information and verbalized understanding. Screening questionnaire reviewed with patient and complete. If procedure requires anesthesia, a responsible adult needs to be present to accompany the patient home, patient cannot drive after receiving anesthesia. Appointment details are tentative, especially check-in time. Patient will receive a prep-op call 7 days prior to confirm check-in time for procedure. If applicable the patient should contact their pharmacy to verify Rx for procedure prep is ready for pick-up. Patient was advised to call the scheduling department at [phone] if pharmacy states no Rx is available. Patient was advised to call the endoscopy scheduling department if any questions or concerns arise.      [facility] Scheduling Department

## 2024-02-20 ENCOUNTER — HOSPITAL ENCOUNTER (OUTPATIENT)
Dept: RADIOLOGY | Facility: HOSPITAL | Age: 74
Discharge: HOME | End: 2024-02-20
Attending: INTERNAL MEDICINE
Payer: MEDICARE

## 2024-02-20 VITALS — BODY MASS INDEX: 30.47 KG/M2 | WEIGHT: 172 LBS

## 2024-02-20 DIAGNOSIS — Z12.31 BREAST CANCER SCREENING BY MAMMOGRAM: ICD-10-CM

## 2024-02-20 PROCEDURE — 77067 SCR MAMMO BI INCL CAD: CPT | Mod: TC

## 2024-02-20 PROCEDURE — 77063 BREAST TOMOSYNTHESIS BI: CPT | Mod: 26,,, | Performed by: RADIOLOGY

## 2024-02-20 PROCEDURE — 77067 SCR MAMMO BI INCL CAD: CPT | Mod: 26,,, | Performed by: RADIOLOGY

## 2024-02-22 ENCOUNTER — PATIENT MESSAGE (OUTPATIENT)
Dept: INTERNAL MEDICINE | Facility: CLINIC | Age: 74
End: 2024-02-22
Payer: MEDICARE

## 2024-02-27 RX ORDER — LEVOTHYROXINE SODIUM 25 UG/1
TABLET ORAL
Qty: 90 TABLET | Refills: 2 | Status: SHIPPED | OUTPATIENT
Start: 2024-02-27

## 2024-02-27 NOTE — TELEPHONE ENCOUNTER
----- Message from Brandi Acevedo sent at 2/27/2024  4:03 PM CST -----  Regarding: refill  Contact: [phone]  Pt requesting a refill   Medication name levothyroxine (SYNTHROID) 25 MCG tablet  Pharmacy [facility] [phone]

## 2024-02-29 ENCOUNTER — CLINICAL SUPPORT (OUTPATIENT)
Dept: DIABETES | Facility: CLINIC | Age: 74
End: 2024-02-29
Payer: MEDICARE

## 2024-02-29 DIAGNOSIS — Z79.4 TYPE 2 DIABETES MELLITUS WITH DIABETIC POLYNEUROPATHY, WITH LONG-TERM CURRENT USE OF INSULIN: Chronic | ICD-10-CM

## 2024-02-29 DIAGNOSIS — E11.42 TYPE 2 DIABETES MELLITUS WITH DIABETIC POLYNEUROPATHY, WITH LONG-TERM CURRENT USE OF INSULIN: Chronic | ICD-10-CM

## 2024-02-29 PROCEDURE — 99999 PR PBB SHADOW E&M-EST. PATIENT-LVL I: CPT | Mod: PBBFAC,,,

## 2024-02-29 PROCEDURE — 99211 OFF/OP EST MAY X REQ PHY/QHP: CPT | Mod: PBBFAC

## 2024-02-29 PROCEDURE — 99999PBSHW PR PBB SHADOW TECHNICAL ONLY FILED TO HB: Mod: PBBFAC,,,

## 2024-02-29 PROCEDURE — G0108 DIAB MANAGE TRN  PER INDIV: HCPCS | Mod: PBBFAC

## 2024-02-29 RX ORDER — BOLUS INSULIN PUMP, 200 UNIT 2 UNIT
EACH MISCELLANEOUS
Qty: 10 EACH | Refills: 6 | Status: SHIPPED | OUTPATIENT
Start: 2024-02-29 | End: 2024-05-31 | Stop reason: SDUPTHER

## 2024-02-29 RX ORDER — DIABETIC SUPPLIES,MISCELL
MISCELLANEOUS MISCELLANEOUS
Qty: 1 EACH | Refills: 1 | Status: SHIPPED | OUTPATIENT
Start: 2024-02-29 | End: 2024-05-31 | Stop reason: SDUPTHER

## 2024-03-01 NOTE — PROGRESS NOTES
Diabetes Care Specialist Progress Note  Author: Kalie Enrique RD  Date: 3/1/2024    Program Intake  Reason for Diabetes Program Visit:: Intervention  Type of Intervention:: Individual  Individual: Education  Education: Self-Management Skill Review, Insulin Pump Evaluation  Current diabetes risk level:: low  In the last 12 months, have you:: none  Permission to speak with others about care:: yes (Pt  in office during appt)  Continuous Glucose Monitoring  Patient has CGM: Yes  Personal CGM type:: Freestyle Jerald 3    Lab Results   Component Value Date    HGBA1C 6.7 (H) 01/10/2024     Clinical      There is no height or weight on file to calculate BMI.    Clinical Assessment  Current Diabetes Treatment: Oral Medication, Insulin pump, Insulin    Medication Information  Medication adherence impacting ability to self-manage diabetes?: No    Labs  Do you have regular lab work to monitor your medications?: Yes  Type of Regular Lab Work: A1c, Cholesterol, Microalbumin, CBC  Lab Compliance Barriers: No    Additional Social History    Support  Does anyone support you with your diabetes care?: yes  Who supports you?: self, spouse  Who takes you to your medical appointments?: self  Does the current support meet the patient's needs?: Yes  Is Support an area impacting ability to self-manage diabetes?: No    Access to Mass Media & Technology  Does the patient have access to any of the following devices or technologies?: Smart phone  Media or technology needs impacting ability to self-manage diabetes?: No    Cognitive/Behavioral Health  Alert and Oriented: Yes  Difficulty Thinking: No  Requires Prompting: No  Requires assistance for routine expression?: No  Cognitive or behavioral barriers impacting ability to self-manage diabetes?: No    Communication  Language preference: English  Hearing Problems: No  Vision Problems: No  Communication needs impacting ability to self-manage diabetes?: No    Health Literacy  Preferred  Learning Method: Face to Face, Demonstration  Health literacy needs impacting ability to self-manage diabetes?: No    Diabetes Self-Management Skills Assessment    Diabetes Disease Process/Treatment Options  Diabetes Disease Process/Treatment Options: Skills Assessment Completed: No  Deferred due to:: Time  Area of need?: Deferred    Nutrition/Healthy Eating  Nutrition/Healthy Eating Skills Assessment Completed:: No  Deffered due to:: Time  Area of need?: Deferred    Physical Activity/Exercise  Physical Activity/Exercise Skills Assessment Completed: : No  Deffered due to:: Time  Area of need?: Deferred    Medications  Patient is able to describe current diabetes management routine.: yes  Diabetes management routine:: insulin, insulin pump, oral medications  Patient is able to identify current diabetes medications, dosages, and appropriate timing of medications.: yes (V-Go: 5 clicks before meals, 1-2 clicks before snacks, Metformin)  Patient reports problems or concerns with current medication regimen.: yes  Medication regimen problems/concerns:: financial concerns  Medication Skills Assessment Completed:: Yes  Assessment indicates:: Instruction Needed  Area of need?: Yes    Home Blood Glucose Monitoring  Patient states that blood sugar is checked at home daily.: yes  Monitoring Method:: personal continuous glucose monitor  Personal CGM type:: Freestyle Jerald 3  Home Blood Glucose Monitoring Skills Assessment Completed: : No  Deferred due to:: Time  Area of need?: Deferred    Acute Complications  Acute Complications Skills Assessment Completed: : No  Deffered due to:: Time  Area of need?: Deferred    Chronic Complications  Patient is taking statin?: Yes  Chronic Complications Skills Assessment Completed: : No  Deferred due to:: Time  Area of need?: Deferred    Psychosocial/Coping  Psychosocial/Coping Skills Assessment Completed: : No  Deffered due to:: Time  Area of need?: Deferred    Assessment Summary and  Plan    Based on today's diabetes care assessment, the following areas of need were identified:          2/29/2024    12:01 AM   Social   Support No   Access to Mass Media/Tech No   Cognitive/Behavioral Health No   Communication No   Health Literacy No            2/29/2024    12:01 AM   Clinical   Medication Adherence No   Lab Compliance No            2/29/2024    12:01 AM   Diabetes Self-Management Skills   Diabetes Disease Process/Treatment Options Deferred   Nutrition/Healthy Eating Deferred   Physical Activity/Exercise Deferred   Medication Yes, see care plan.   Home Blood Glucose Monitoring Deferred   Acute Complications Deferred   Chronic Complications Deferred   Psychosocial/Coping Deferred      Today's interventions were provided through individual discussion, instruction, and written materials were provided.      Patient verbalized understanding of instruction and written materials.  Pt was able to return back demonstration of instructions today. Patient understood key points, needs reinforcement and further instruction.     Diabetes Self-Management Care Plan:    Today's Diabetes Self-Management Care Plan was developed with Linda's input. Linda has agreed to work toward the following goal(s) to improve his/her overall diabetes control.      Care Plan: Diabetes Management   Updates made since 1/31/2024 12:00 AM        Problem: Medications         Goal: Patient Agrees to take Diabetes Medications as prescribed.    Start Date: 2/29/2024   Expected End Date: 6/1/2024   This Visit's Progress: Deferred   Priority: High   Barriers: Financial   Note:    Pt reports concern with affordability of V-Go 2/2 insurance. Educator reviewed features of Omnipod DASH with pt and discussed other options of insulin delivery. Educator messaged NP.       Follow Up Plan     Follow up Once pt/pt insurance decide other means of insulin delivery.    Today's care plan and follow up schedule was discussed with patient.  Linda verbalized  understanding of the care plan, goals, and agrees to follow up plan.        The patient was encouraged to communicate with his/her health care provider/physician and care team regarding his/her condition(s) and treatment.  I provided the patient with my contact information today and encouraged to contact me via phone or [facility] Patient Portal as needed.     Length of Visit   Total Time: 45 Minutes

## 2024-03-07 ENCOUNTER — TELEPHONE (OUTPATIENT)
Dept: ENDOSCOPY | Facility: HOSPITAL | Age: 74
End: 2024-03-07
Payer: MEDICARE

## 2024-03-13 ENCOUNTER — ANESTHESIA EVENT (OUTPATIENT)
Dept: ENDOSCOPY | Facility: HOSPITAL | Age: 74
End: 2024-03-13
Payer: MEDICARE

## 2024-03-13 ENCOUNTER — HOSPITAL ENCOUNTER (OUTPATIENT)
Facility: HOSPITAL | Age: 74
Discharge: HOME | End: 2024-03-13
Attending: INTERNAL MEDICINE | Admitting: INTERNAL MEDICINE
Payer: MEDICARE

## 2024-03-13 ENCOUNTER — ANESTHESIA (OUTPATIENT)
Dept: ENDOSCOPY | Facility: HOSPITAL | Age: 74
End: 2024-03-13
Payer: MEDICARE

## 2024-03-13 VITALS
WEIGHT: 172 LBS | SYSTOLIC BLOOD PRESSURE: 141 MMHG | TEMPERATURE: 98 F | BODY MASS INDEX: 30.48 KG/M2 | RESPIRATION RATE: 15 BRPM | OXYGEN SATURATION: 98 % | HEIGHT: 63 IN | HEART RATE: 88 BPM | DIASTOLIC BLOOD PRESSURE: 73 MMHG

## 2024-03-13 DIAGNOSIS — Z86.010 HISTORY OF COLON POLYPS: ICD-10-CM

## 2024-03-13 LAB — POCT GLUCOSE: 175 MG/DL (ref 70–110)

## 2024-03-13 PROCEDURE — 82962 GLUCOSE BLOOD TEST: CPT | Performed by: INTERNAL MEDICINE

## 2024-03-13 PROCEDURE — 37000008 HC ANESTHESIA 1ST 15 MINUTES: Performed by: INTERNAL MEDICINE

## 2024-03-13 PROCEDURE — 63600175 PHARM REV CODE 636 W HCPCS: Performed by: NURSE ANESTHETIST, CERTIFIED REGISTERED

## 2024-03-13 PROCEDURE — G0105 COLORECTAL SCRN; HI RISK IND: HCPCS | Mod: ,,, | Performed by: INTERNAL MEDICINE

## 2024-03-13 PROCEDURE — E9220 PRA ENDO ANESTHESIA: HCPCS | Mod: ,,, | Performed by: NURSE ANESTHETIST, CERTIFIED REGISTERED

## 2024-03-13 PROCEDURE — 25000003 PHARM REV CODE 250: Performed by: NURSE ANESTHETIST, CERTIFIED REGISTERED

## 2024-03-13 PROCEDURE — 37000009 HC ANESTHESIA EA ADD 15 MINS: Performed by: INTERNAL MEDICINE

## 2024-03-13 PROCEDURE — G0105 COLORECTAL SCRN; HI RISK IND: HCPCS | Performed by: INTERNAL MEDICINE

## 2024-03-13 RX ORDER — SODIUM CHLORIDE 9 MG/ML
INJECTION, SOLUTION INTRAVENOUS CONTINUOUS
Status: DISCONTINUED | OUTPATIENT
Start: 2024-03-13 | End: 2024-03-13 | Stop reason: HOSPADM

## 2024-03-13 RX ORDER — LIDOCAINE HYDROCHLORIDE 20 MG/ML
INJECTION INTRAVENOUS
Status: DISCONTINUED | OUTPATIENT
Start: 2024-03-13 | End: 2024-03-13

## 2024-03-13 RX ORDER — PROPOFOL 10 MG/ML
VIAL (ML) INTRAVENOUS
Status: DISCONTINUED | OUTPATIENT
Start: 2024-03-13 | End: 2024-03-13

## 2024-03-13 RX ADMIN — SODIUM CHLORIDE: 0.9 INJECTION, SOLUTION INTRAVENOUS at 09:03

## 2024-03-13 RX ADMIN — LIDOCAINE HYDROCHLORIDE 50 MG: 20 INJECTION INTRAVENOUS at 09:03

## 2024-03-13 RX ADMIN — PROPOFOL 100 MG: 10 INJECTION, EMULSION INTRAVENOUS at 09:03

## 2024-03-13 NOTE — TRANSFER OF CARE
"Anesthesia Transfer of Care Note    Patient: Linda Fong    Procedure(s) Performed: Procedure(s) (LRB):  COLONOSCOPY (N/A)    Patient location: GI    Anesthesia Type: general    Transport from OR: Transported from OR on room air with adequate spontaneous ventilation    Post pain: adequate analgesia    Post assessment: no apparent anesthetic complications and tolerated procedure well    Post vital signs: stable    Level of consciousness: responds to stimulation    Nausea/Vomiting: no vomiting    Complications: none    Transfer of care protocol was followed      Last vitals: Visit Vitals  /72  (BP Location: Left arm, Patient Position: Lying)   Pulse 85   Temp 36.6 °C (97.9 °F)   Resp 14   Ht 5' 3" (1.6 m)   Wt 78 kg (172 lb)   SpO2 97%   Breastfeeding No   BMI 30.47 kg/m²     "

## 2024-03-13 NOTE — ANESTHESIA POSTPROCEDURE EVALUATION
Anesthesia Post Evaluation    Patient: Linda Fong    Procedure(s) Performed: Procedure(s) (LRB):  COLONOSCOPY (N/A)    Final Anesthesia Type: general      Patient location during evaluation: PACU  Patient participation: Yes- Able to Participate  Level of consciousness: awake and alert  Post-procedure vital signs: reviewed and stable  Pain management: adequate  Airway patency: patent    PONV status at discharge: No PONV  Anesthetic complications: no      Cardiovascular status: blood pressure returned to baseline  Respiratory status: unassisted  Follow-up not needed.              Vitals Value Taken Time   /73 03/13/24 1035   Temp 36.4 °C (97.5 °F) 03/13/24 1020   Pulse 88 03/13/24 1035   Resp 15 03/13/24 1035   SpO2 98 % 03/13/24 1035         No case tracking events are documented in the log.      Pain/Mirta Score: Mirta Score: 9 (3/13/2024 10:22 AM)

## 2024-03-13 NOTE — ANESTHESIA PREPROCEDURE EVALUATION
03/13/2024  Linda Fong is a 74 y.o., female.  Past Medical History:   Diagnosis Date    Altered mental status 1/5/2019    CVA (cerebral vascular accident) 1/5/2019    Diabetic nephropathy     DJD (degenerative joint disease)     Goiter     HTN (hypertension)     Hyperlipidemia     Intracranial bleeding     12/10    Osteopenia     PN (peripheral neuropathy)     Spell of altered cognition 5/19/2014    TIA (transient ischemic attack)     Traumatic brain injury Dec 2010    Type II or unspecified type diabetes mellitus with renal manifestations, uncontrolled(250.42)      Past Surgical History:   Procedure Laterality Date    COLONOSCOPY N/A 2/19/2019    Procedure: COLONOSCOPY;  Surgeon: Chinedu Hopkins MD;  Location: [address]);  Service: Endoscopy;  Laterality: N/A;    TONSILLECTOMY     2019  Normal left ventricular systolic function. The estimated ejection fraction is 65%  Grade I (mild) left ventricular diastolic dysfunction consistent with impaired relaxation.  Normal right ventricular systolic function.  Negative bubble study       Pre-op Assessment    I have reviewed the Patient Summary Reports.     I have reviewed the Nursing Notes. I have reviewed the NPO Status.   I have reviewed the Medications.     Review of Systems  Anesthesia Hx:  No problems with previous Anesthesia             Denies Family Hx of Anesthesia complications.    Denies Personal Hx of Anesthesia complications.                    Hematology/Oncology:  Hematology Normal   Oncology Normal                                   EENT/Dental:  EENT/Dental Normal           Cardiovascular:     Hypertension                                        Pulmonary:  Pulmonary Normal                       Renal/:  Chronic Renal Disease                Musculoskeletal:  Arthritis               Neurological:  TIA, CVA, residual symptoms  Neuromuscular Disease,  Headaches                                 Endocrine:  Diabetes Hypothyroidism        Obesity / BMI > 30  Dermatological:  Skin Normal        Physical Exam  General: Well nourished    Airway:  Mallampati: III   Mouth Opening: Normal  TM Distance: Normal  Tongue: Normal  Neck ROM: Normal ROM    Dental:  Intact        Anesthesia Plan  Type of Anesthesia, risks & benefits discussed:    Anesthesia Type: Gen Natural Airway  Intra-op Monitoring Plan: Standard ASA Monitors  Informed Consent: Informed consent signed with the Patient and all parties understand the risks and agree with anesthesia plan.  All questions answered.   ASA Score: 3  Day of Surgery Review of History & Physical: H&P Update referred to the surgeon/provider.I have interviewed and examined the patient. I have reviewed the patient's H&P dated: There are no significant changes.     Ready For Surgery From Anesthesia Perspective.     .

## 2024-03-13 NOTE — PROVATION PATIENT INSTRUCTIONS
Discharge Summary/Instructions after an Endoscopic Procedure  Patient Name: Linda Rob  Patient MRN: [id number]  Patient YOB: 1950 Wednesday, March 13, 2024  Pérez Crum MD  Dear patient,  As a result of recent federal legislation (The Federal Cures Act), you may   receive lab or pathology results from your procedure in your [facility]   account before your physician is able to contact you. Your physician or   their representative will relay the results to you with their   recommendations at their soonest availability.  Thank you,  RESTRICTIONS:  During your procedure today, you received medications for sedation.  These   medications may affect your judgment, balance and coordination.  Therefore,   for 24 hours, you have the following restrictions:   - DO NOT drive a car, operate machinery, make legal/financial decisions,   sign important papers or drink alcohol.    ACTIVITY:  Today: no heavy lifting, straining or running due to procedural   sedation/anesthesia.  The following day: return to full activity including work.  DIET:  Eat and drink normally unless instructed otherwise.     TREATMENT FOR COMMON SIDE EFFECTS:  - Mild abdominal pain, nausea, belching, bloating or excessive gas:  rest,   eat lightly and use a heating pad.  - Sore Throat: treat with throat lozenges and/or gargle with warm salt   water.  - Because air was used during the procedure, expelling large amounts of air   from your rectum or belching is normal.  - If a bowel prep was taken, you may not have a bowel movement for 1-3 days.    This is normal.  SYMPTOMS TO WATCH FOR AND REPORT TO YOUR PHYSICIAN:  1. Abdominal pain or bloating, other than gas cramps.  2. Chest pain.  3. Back pain.  4. Signs of infection such as: chills or fever occurring within 24 hours   after the procedure.  5. Rectal bleeding, which would show as bright red, maroon, or black stools.   (A tablespoon of blood from the rectum is not serious, especially  if   hemorrhoids are present.)  6. Vomiting.  7. Weakness or dizziness.  GO DIRECTLY TO THE NEAREST EMERGENCY ROOM IF YOU HAVE ANY OF THE FOLLOWING:      Difficulty breathing              Chills and/or fever over 101 F   Persistent vomiting and/or vomiting blood   Severe abdominal pain   Severe chest pain   Black, tarry stools   Bleeding- more than one tablespoon   Any other symptom or condition that you feel may need urgent attention  Your doctor recommends these additional instructions:  If any biopsies were taken, your doctors clinic will contact you in 1 to 2   weeks with any results.  - Discharge patient to home.   - High fiber diet indefinitely.   - Continue present medications.   - Use fiber, for example Citrucel, Fibercon, Konsyl or Metamucil.   - Repeat colonoscopy is not recommended due to current age (66 years or   older) for surveillance.   - Further colonoscopy for surveillance/screening purposes is not recommended   given the patient's age and/or comorbidies.  - Return to referring physician as previously scheduled.   - Patient has a contact number available for emergencies.  The signs and   symptoms of potential delayed complications were discussed with the   patient.  Return to normal activities tomorrow.  Written discharge   instructions were provided to the patient.  For questions, problems or results please call your physician - Pérez Crum MD at Work:  [phone].  [facility] [place] [facility], EMERGENCY ROOM PHONE NUMBER: [phone]  IF A COMPLICATION OR EMERGENCY SITUATION ARISES AND YOU ARE UNABLE TO REACH   YOUR PHYSICIAN - GO DIRECTLY TO THE EMERGENCY ROOM.  Pérez Crum MD  3/13/2024 10:19:53 AM  This report has been verified and signed electronically.  Dear patient,  As a result of recent federal legislation (The Federal Cures Act), you may   receive lab or pathology results from your procedure in your [facility]   account before your physician is able to contact you. Your  physician or   their representative will relay the results to you with their   recommendations at their soonest availability.  Thank you,  PROVATION

## 2024-03-13 NOTE — H&P
Short Stay Endoscopy History and Physical    PCP - Rob Phillips MD    Procedure - Colonoscopy  ASA - 3  Mallampati - per anesthesia  History of Anesthesia problems - no  Family history Anesthesia problems -  no     HPI:  This is a 74 y.o. female here for evaluation of :     Average Risk Screening: No  High risk screening: yes  History of polyps: yes  Anemia: No  Blood in stools: No  Diarrhea: No  Abdominal Pain: No    Review of Systems:  CONSTITUTIONAL: Denies weight change,  fatigue, fevers, chills, night sweats.  CARDIOVASCULAR: Denies chest pain, shortness of breath, orthopnea and edema.  RESPIRATORY: Denies cough, hemoptysis, dyspnea, and wheezing.  GI: See HPI.    Medical History:  Past Medical History:   Diagnosis Date    Altered mental status 1/5/2019    CVA (cerebral vascular accident) 1/5/2019    Diabetic nephropathy     DJD (degenerative joint disease)     Goiter     HTN (hypertension)     Hyperlipidemia     Intracranial bleeding     12/10    Osteopenia     PN (peripheral neuropathy)     Spell of altered cognition 5/19/2014    TIA (transient ischemic attack)     Traumatic brain injury Dec 2010    Type II or unspecified type diabetes mellitus with renal manifestations, uncontrolled(250.42)        Surgical History:   Past Surgical History:   Procedure Laterality Date    COLONOSCOPY N/A 2/19/2019    Procedure: COLONOSCOPY;  Surgeon: Chinedu Hopkins MD;  Location: [facility] ([address] [place] [address]);  Service: Endoscopy;  Laterality: N/A;    TONSILLECTOMY         Family History:   Family History   Problem Relation Age of Onset    Diabetes Mother     Breast cancer Mother 83    Thyroid disease Mother     Stroke Mother     Cancer Mother     Hypertension Mother     Diabetes Father     Cataracts Father     Stroke Father     Hypertension Father     Thyroid disease Daughter     Diabetes Maternal Grandmother     Stroke Maternal Grandmother     Hypertension Maternal Grandmother     Stroke Maternal Grandfather     Hypertension  Maternal Grandfather     Diabetes Paternal Grandmother     Stroke Paternal Grandmother     Hypertension Paternal Grandmother     Stroke Paternal Grandfather     Hypertension Paternal Grandfather     Amblyopia Neg Hx     Blindness Neg Hx     Glaucoma Neg Hx     Macular degeneration Neg Hx     Retinal detachment Neg Hx     Strabismus Neg Hx     Melanoma Neg Hx        Social History:   Social History     Tobacco Use    Smoking status: Never    Smokeless tobacco: Never   Substance Use Topics    Alcohol use: No    Drug use: No       Allergies: Reviewed.    Medications:  No current facility-administered medications on file prior to encounter.     Current Outpatient Medications on File Prior to Encounter   Medication Sig Dispense Refill    aspirin (ECOTRIN) 81 MG EC tablet Take 81 mg by mouth once daily.      dulaglutide (TRULICITY) 1.5 mg/0.5 mL pen injector Inject 1.5 mg into the skin every 7 days. 12 pen 2    insulin aspart U-100 (NOVOLOG U-100 INSULIN ASPART) 100 unit/mL injection Uses vgo 40, max daily 130 units, 12 vials per 90 days, 6 clicks with meals, correction scale 180-230+2, 231-280+4, 281-330+6, 331-380+8, >380+10. 120 mL 3    ascorbic acid, vitamin C, (VITAMIN C) 100 MG tablet Take 100 mg by mouth once daily.      atorvastatin (LIPITOR) 20 MG tablet Take 1 tablet (20 mg total) by mouth once daily. 90 tablet 3    biotin 1 mg Cap Take 1 capsule by mouth once daily.      blood sugar diagnostic Strp Accuchek guide meter. To check BG 3  times daily, to use with insurance preferred meter, e 11.65 300 each 3    blood-glucose meter kit Accuchek guide meter. To check BG 3  times daily, to use with insurance preferred meter, e 11.65 1 each 0    ergocalciferol (ERGOCALCIFEROL) 50,000 unit Cap TAKE 1 CAPSULE EVERY 30    DAYS 3 capsule 3    flash glucose sensor (FREESTYLE SIDNEY 14 DAY SENSOR) Kit Change every 14 days e 11.65 7 kit 3    lancets Misc To check BG 3  times daily, to use with insurance preferred meter, e  11.65 300 each 3    losartan-hydrochlorothiazide 50-12.5 mg (HYZAAR) 50-12.5 mg per tablet Take 1 tablet by mouth once daily. 90 tablet 3    metFORMIN (GLUCOPHAGE) 1000 MG tablet Take 1 tablet daily 90 tablet 3    multivitamin capsule Take 1 capsule by mouth once daily.      omega-3s/dha/epa/fish oil/D3 (VITAMIN-D + OMEGA-3 ORAL) Take by mouth.      sub-q insulin device, 30 unit (V-GO 30) Mariel Use daily. 30 each 3    sub-q insulin device, 30 unit (V-GO 30) Mariel Change daily 90 each 3    zinc gluconate 50 mg tablet Take 50 mg by mouth once daily.         Physical Exam:  Vital Signs:   Vitals:    03/13/24 0922   BP: (!) 169/78   Pulse: 85   Resp: 14   Temp: 97.9 °F (36.6 °C)     General Appearance: Well appearing in no acute distress  ENT: OP clear  Chest: CTA B  CV: RRR, no m/r/g  Abd: s/nt/nd/nabs  Ext: no edema    Labs:  Reviewed    IMPRESSION:    Hx of polyps    Plan:  I have explained the risks and benefits of colonoscopy to the patient including but not limited to bleeding, perforation, infection, and death. The patient wishes to proceed with colonoscopy.

## 2024-05-14 ENCOUNTER — PATIENT MESSAGE (OUTPATIENT)
Dept: INTERNAL MEDICINE | Facility: CLINIC | Age: 74
End: 2024-05-14
Payer: MEDICARE

## 2024-05-14 ENCOUNTER — TELEPHONE (OUTPATIENT)
Dept: INTERNAL MEDICINE | Facility: CLINIC | Age: 74
End: 2024-05-14

## 2024-05-14 NOTE — TELEPHONE ENCOUNTER
I spoke to the patient, the pain started the first of May, it felt like I hit or bruised her rib, pain level is the same as it was then. It's not clearing up, so I'm calling for advice. If she takes her finger and press into it it hurts. She can't tell if it's bone or muscle, pain level is 5-6/10, a little more than noticeable it's annoying. I can't lay on my side. No nausea, diarrhea or constipation. No car accidents no falls. Pain is just in one spot, just at the bottom of her breast right in the center of her breast but it could be muscle.No SOB. Nothing aggravates it. 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 Mornings are best. Mammo was negative as of January 2024. FYI

## 2024-05-14 NOTE — TELEPHONE ENCOUNTER
Contacted JENNIFER Knott to contact pt for triage    Consulted with Dr. Alan Phillips gave verbal permission to override schedule for pt     Called pt; pt did not answer- direct to voicemail   Left message asking pt to call back to schedule appt

## 2024-05-15 NOTE — TELEPHONE ENCOUNTER
Called pt; pt did not answer- straight to voice mail    Left message asking pt to call back if she would like to schedule an appointment

## 2024-05-16 ENCOUNTER — TELEPHONE (OUTPATIENT)
Dept: INTERNAL MEDICINE | Facility: CLINIC | Age: 74
End: 2024-05-16
Payer: MEDICARE

## 2024-05-16 NOTE — TELEPHONE ENCOUNTER
----- Message from Lisa Yin sent at 5/16/2024  8:59 AM CDT -----  Contact: Mobile  [phone]  Patient is returning a phone call.  Who left a message for the patient:   Does patient know what this is regarding:    Would you like a call back, or a response through your [facility] portal?:   Call  Comments: Patient said that she received a call on yesterday.

## 2024-05-16 NOTE — TELEPHONE ENCOUNTER
LVM another message to let her know Dr. Phillips wants to see her in clinic because she called us about having chest pain.

## 2024-05-17 ENCOUNTER — TELEPHONE (OUTPATIENT)
Dept: INTERNAL MEDICINE | Facility: CLINIC | Age: 74
End: 2024-05-17
Payer: MEDICARE

## 2024-05-17 NOTE — TELEPHONE ENCOUNTER
----- Message from Phoebe Araujotte Nikos sent at 5/16/2024 12:04 PM CDT -----  Contact: [phone]  Type: Returning a call    Who left a message?Devi Schmitt MA    When did the practice call?today    Comments:pt states if you have something this afternoon she can come within the hour.  Please call her back

## 2024-05-31 ENCOUNTER — HOSPITAL ENCOUNTER (OUTPATIENT)
Dept: RADIOLOGY | Facility: HOSPITAL | Age: 74
Discharge: HOME | End: 2024-05-31
Attending: INTERNAL MEDICINE
Payer: MEDICARE

## 2024-05-31 ENCOUNTER — OFFICE VISIT (OUTPATIENT)
Dept: INTERNAL MEDICINE | Facility: CLINIC | Age: 74
End: 2024-05-31
Payer: MEDICARE

## 2024-05-31 ENCOUNTER — TELEPHONE (OUTPATIENT)
Dept: INTERNAL MEDICINE | Facility: CLINIC | Age: 74
End: 2024-05-31
Payer: MEDICARE

## 2024-05-31 VITALS
SYSTOLIC BLOOD PRESSURE: 142 MMHG | WEIGHT: 177.94 LBS | HEIGHT: 63 IN | BODY MASS INDEX: 31.53 KG/M2 | HEART RATE: 76 BPM | OXYGEN SATURATION: 96 % | DIASTOLIC BLOOD PRESSURE: 72 MMHG

## 2024-05-31 DIAGNOSIS — G89.29 CHRONIC LEFT SHOULDER PAIN: ICD-10-CM

## 2024-05-31 DIAGNOSIS — R07.89 LEFT-SIDED CHEST WALL PAIN: Primary | ICD-10-CM

## 2024-05-31 DIAGNOSIS — M79.10 MYALGIA: ICD-10-CM

## 2024-05-31 DIAGNOSIS — R07.89 LEFT-SIDED CHEST WALL PAIN: ICD-10-CM

## 2024-05-31 DIAGNOSIS — M25.512 CHRONIC LEFT SHOULDER PAIN: ICD-10-CM

## 2024-05-31 PROCEDURE — 71100 X-RAY EXAM RIBS UNI 2 VIEWS: CPT | Mod: TC,LT

## 2024-05-31 PROCEDURE — 99215 OFFICE O/P EST HI 40 MIN: CPT | Mod: PBBFAC,25 | Performed by: INTERNAL MEDICINE

## 2024-05-31 PROCEDURE — 73030 X-RAY EXAM OF SHOULDER: CPT | Mod: 26,LT,, | Performed by: RADIOLOGY

## 2024-05-31 PROCEDURE — 71100 X-RAY EXAM RIBS UNI 2 VIEWS: CPT | Mod: 26,LT,, | Performed by: RADIOLOGY

## 2024-05-31 PROCEDURE — 99214 OFFICE O/P EST MOD 30 MIN: CPT | Mod: S$PBB,,, | Performed by: INTERNAL MEDICINE

## 2024-05-31 PROCEDURE — 73030 X-RAY EXAM OF SHOULDER: CPT | Mod: TC,LT

## 2024-05-31 PROCEDURE — 99999 PR PBB SHADOW E&M-EST. PATIENT-LVL V: CPT | Mod: PBBFAC,,, | Performed by: INTERNAL MEDICINE

## 2024-05-31 RX ORDER — BOLUS INSULIN PUMP, 200 UNIT 2 UNIT
EACH MISCELLANEOUS
Qty: 10 EACH | Refills: 6 | Status: SHIPPED | OUTPATIENT
Start: 2024-05-31 | End: 2024-06-07 | Stop reason: SDUPTHER

## 2024-05-31 RX ORDER — METHOCARBAMOL 500 MG/1
500 TABLET, FILM COATED ORAL 3 TIMES DAILY PRN
Qty: 90 TABLET | Refills: 0 | Status: SHIPPED | OUTPATIENT
Start: 2024-05-31 | End: 2024-06-30

## 2024-05-31 RX ORDER — DIABETIC SUPPLIES,MISCELL
MISCELLANEOUS MISCELLANEOUS
Qty: 1 EACH | Refills: 1 | Status: SHIPPED | OUTPATIENT
Start: 2024-05-31 | End: 2024-06-07 | Stop reason: SDUPTHER

## 2024-05-31 NOTE — PROGRESS NOTES
Subjective:       Patient ID: Linda Fong is a 74 y.o. female.    Chief Complaint: Flank Pain (X 1 mth/Possibly from fall in 10/23)      HPI  Linda Fong is a 74 y.o. year old female presents for left chest wall pain onset 10/2023. Imaging not done at that time as she felt she did not break any ribs and it would not have changed her management. States that it has been aching for the past month intermittently, improves when she takes meloxicam. Denies chest pressure, shortness of breath.     Review of Systems   Respiratory:  Negative for chest tightness and shortness of breath.    Cardiovascular:         Chest wall pain   Gastrointestinal:  Negative for abdominal pain.   Musculoskeletal:  Positive for myalgias.         Past Medical History:   Diagnosis Date    Altered mental status 1/5/2019    CVA (cerebral vascular accident) 1/5/2019    Diabetic nephropathy     DJD (degenerative joint disease)     Goiter     HTN (hypertension)     Hyperlipidemia     Intracranial bleeding     12/10    Osteopenia     PN (peripheral neuropathy)     Spell of altered cognition 5/19/2014    TIA (transient ischemic attack)     Traumatic brain injury Dec 2010    Type II or unspecified type diabetes mellitus with renal manifestations, uncontrolled(250.42)         Prior to Admission medications    Medication Sig Start Date End Date Taking? Authorizing Provider   ascorbic acid, vitamin C, (VITAMIN C) 100 MG tablet Take 100 mg by mouth once daily.   Yes Provider, Historical   aspirin (ECOTRIN) 81 MG EC tablet Take 81 mg by mouth once daily.   Yes Provider, Historical   atorvastatin (LIPITOR) 20 MG tablet Take 1 tablet (20 mg total) by mouth once daily. 5/1/23  Yes J Luis Stevens APRN, FNP   biotin 1 mg Cap Take 1 capsule by mouth once daily.   Yes Provider, Historical   blood sugar diagnostic Strp Accuchek guide meter. To check BG 3  times daily, to use with insurance preferred meter, e 11.65 8/24/22  Yes J Luis Stevens APRN, FNP    blood-glucose meter kit Accuchek guide meter. To check BG 3  times daily, to use with insurance preferred meter, e 11.65 8/24/22 5/31/24 Yes J Luis Stevens APRN, FNP   bolus insulin pump, 200 unit (CEQUR SIMPLICITY) 2 unit Mariel Use as directed, 1-7 clicks before meals, snacks up to 4x a day. 2/29/24  Yes J Luis Stevens APRN, FNP   diabetic supplies, miscellan. (CEQUR SIMPLICITY ) Misc Use as directed. 2/29/24  Yes J Luis Stevens APRN, FNP   dulaglutide (TRULICITY) 1.5 mg/0.5 mL pen injector Inject 1.5 mg into the skin every 7 days. 1/25/24  Yes J Luis Stevens APRN, FNP   ergocalciferol (ERGOCALCIFEROL) 50,000 unit Cap TAKE 1 CAPSULE EVERY 30    DAYS 6/6/23  Yes Rob Phillips MD   flash glucose sensor (FREESTYLE SIDNEY 14 DAY SENSOR) Kit Change every 14 days e 11.65 6/6/23  Yes J Luis Stevens APRN, FNP   insulin aspart U-100 (NOVOLOG U-100 INSULIN ASPART) 100 unit/mL injection Uses vgo 40, max daily 130 units, 12 vials per 90 days, 6 clicks with meals, correction scale 180-230+2, 231-280+4, 281-330+6, 331-380+8, >380+10. 1/25/24  Yes J Luis Stevens APRN, FNP   lancets [facility] To check BG 3  times daily, to use with insurance preferred meter, e 11.65 8/10/21  Yes J Luis Stevens APRN, MILAP   levothyroxine (SYNTHROID) 25 MCG tablet Take 1 tablet in the morning on an empty stomach, water only, wait 30-45 minutes before eating, drinking or taking other medications. 2/27/24  Yes J Luis Stevens APRN, MILAP   losartan-hydrochlorothiazide 50-12.5 mg (HYZAAR) 50-12.5 mg per tablet Take 1 tablet by mouth once daily. 5/1/23  Yes Stevens, Irielle L., APRN, FNP   metFORMIN (GLUCOPHAGE) 1000 MG tablet Take 1 tablet daily 5/2/23  Yes J Luis Stevens APRN, MILAP   multivitamin capsule Take 1 capsule by mouth once daily.   Yes Provider, Historical   omega-3s/dha/epa/fish oil/D3 (VITAMIN-D + OMEGA-3 ORAL) Take by mouth.   Yes Provider, Historical   sub-q insulin device, 30 unit (V-GO 30) Mariel Use daily.  "1/25/24  Yes J Luis Stevens APRN, FNP   sub-q insulin device, 30 unit (V-GO 30) Mariel Change daily 1/25/24  Yes J Luis Stevens APRN, FNP   zinc gluconate 50 mg tablet Take 50 mg by mouth once daily.   Yes Provider, Historical        Past medical history, surgical history, and family medical history reviewed and updated as appropriate.    Medications and allergies reviewed.     Objective:          Vitals:    05/31/24 1047   BP: (!) 142/72   BP Location: Left arm   Patient Position: Sitting   BP Method: Medium (Manual)   Pulse: 76   SpO2: 96%   Weight: 80.7 kg (177 lb 14.6 oz)   Height: 5' 3" (1.6 m)     Body mass index is 31.52 kg/m².  Physical Exam  Constitutional:       Appearance: Normal appearance. She is obese.   Cardiovascular:      Rate and Rhythm: Normal rate and regular rhythm.   Pulmonary:      Effort: Pulmonary effort is normal.      Comments: Reproducible chest wall pain  Musculoskeletal:         General: Tenderness present. Normal range of motion.   Neurological:      Mental Status: She is alert.         Lab Results   Component Value Date    WBC 9.27 04/27/2023    HGB 12.7 04/27/2023    HCT 40.0 04/27/2023     04/27/2023    CHOL 150 04/27/2023    TRIG 114 04/27/2023    HDL 57 04/27/2023    ALT 15 04/27/2023    AST 21 04/27/2023     04/27/2023    K 4.3 04/27/2023     04/27/2023    CREATININE 0.8 04/27/2023    BUN 13 04/27/2023    CO2 30 (H) 04/27/2023    TSH 2.070 01/10/2024    INR 1.0 01/05/2019    HGBA1C 6.7 (H) 01/10/2024       Assessment:       1. Left-sided chest wall pain    2. Chronic left shoulder pain    3. Myalgia          Plan:     Linda was seen today for flank pain.    Diagnoses and all orders for this visit:    Left-sided chest wall pain  -     X-Ray Ribs 2 View Left; Future  -     Ambulatory referral/consult to Physical/Occupational Therapy; Future    Chronic left shoulder pain  -     X-Ray Shoulder 2 or More Views Left; Future  -     Ambulatory referral/consult to " Physical/Occupational Therapy; Future    Myalgia  -     methocarbamoL (ROBAXIN) 500 MG Tab; Take 1 tablet (500 mg total) by mouth 3 (three) times daily as needed (muscle spasms).        Health maintenance reviewed with patient.     Follow up if symptoms worsen or fail to improve.    Rob Phillips MD  Internal Medicine / Primary Care  [facility]  5/31/2024

## 2024-05-31 NOTE — TELEPHONE ENCOUNTER
----- Message from Inocencio Damon sent at 5/31/2024  3:04 PM CDT -----  Contact: [phone] @[employer]  Good afternoon [facility] would like a call back to discuss some clinic notes for the patient med. Please call them to advise [phone] Ref#[id number]

## 2024-05-31 NOTE — PATIENT INSTRUCTIONS
X-ray of chest wall and shoulder today.     Prescription of methocarbamol 500 mg three times a day as needed.     Referral placed to physical therapy, they should be calling you to schedule at your desired location.     Return to clinic on 7/12/2024 as scheduled for follow up.

## 2024-06-07 ENCOUNTER — CLINICAL SUPPORT (OUTPATIENT)
Dept: DIABETES | Facility: CLINIC | Age: 74
End: 2024-06-07
Payer: MEDICARE

## 2024-06-07 DIAGNOSIS — E11.42 TYPE 2 DIABETES MELLITUS WITH DIABETIC POLYNEUROPATHY, WITH LONG-TERM CURRENT USE OF INSULIN: Primary | ICD-10-CM

## 2024-06-07 DIAGNOSIS — Z79.4 TYPE 2 DIABETES MELLITUS WITH DIABETIC POLYNEUROPATHY, WITH LONG-TERM CURRENT USE OF INSULIN: Primary | ICD-10-CM

## 2024-06-07 PROCEDURE — 99211 OFF/OP EST MAY X REQ PHY/QHP: CPT | Mod: PBBFAC

## 2024-06-07 PROCEDURE — G0108 DIAB MANAGE TRN  PER INDIV: HCPCS | Mod: PBBFAC

## 2024-06-07 PROCEDURE — 99999PBSHW PR PBB SHADOW TECHNICAL ONLY FILED TO HB: Mod: PBBFAC,,,

## 2024-06-07 PROCEDURE — 99999 PR PBB SHADOW E&M-EST. PATIENT-LVL I: CPT | Mod: PBBFAC,,,

## 2024-06-07 RX ORDER — BOLUS INSULIN PUMP, 200 UNIT 2 UNIT
EACH MISCELLANEOUS
Qty: 10 EACH | Refills: 6 | Status: SHIPPED | OUTPATIENT
Start: 2024-06-07 | End: 2024-06-07

## 2024-06-07 RX ORDER — DIABETIC SUPPLIES,MISCELL
MISCELLANEOUS MISCELLANEOUS
Qty: 1 EACH | Refills: 1 | Status: SHIPPED | OUTPATIENT
Start: 2024-06-07 | End: 2024-06-10 | Stop reason: SDUPTHER

## 2024-06-07 RX ORDER — BOLUS INSULIN PUMP, 200 UNIT 2 UNIT
EACH MISCELLANEOUS
Qty: 30 EACH | Refills: 3 | Status: SHIPPED | OUTPATIENT
Start: 2024-06-07 | End: 2024-06-10 | Stop reason: SDUPTHER

## 2024-06-08 ENCOUNTER — PATIENT MESSAGE (OUTPATIENT)
Dept: INTERNAL MEDICINE | Facility: CLINIC | Age: 74
End: 2024-06-08
Payer: MEDICARE

## 2024-06-09 NOTE — PROGRESS NOTES
CC: Patient is here for management of Type 2 diabetes and review of medical conditions as listed in visit diagnosis. She is accompanied by her .      HPI: Ms. Linda Fong is a 74 y.o. female who was diagnosed with Type 2 in 1993, on employment physical exam with labs. She has never been hospitalized r/t DM. She suffered a ICH after a fall years ago and has had decreased memory since then.  Previously tried Novolog AC.    Pt has h/o hypothyroidism, HTN, PN, AMS, TIA like symptoms, ocular HAs,     ER visit 1/2019- AMS, work up for stroke, NSTEMI- both negative, MRI of  Brain, EEG done.       Denies personal history of pancreatitis or pancreatic cancer. Denies family hx of thyroid cancer.     Seen by FARZANEH Tomas DNP, MANAV Hutton NP.   Being seen by me again today. Established care > 4 years w/ me.   Last seen by me in winter 2024.   Had fall 10/25/24, injured rib (L) sided, starting PT on Wednesday. Fell down bleachers at game.  Loss balance, had different set of glasses.  Since then has new pair.   Needs another DE appointment for cequr and lantus start.  Awaiting shipment with trulicity.    Continues with vgo.   Using alexsander 3  TIR 79%   Avg 149 mg/dl   Gmi 6.9%   Glucose variablity 29.3%   1% hypoglycemia     Has fanny on phone    Price alexsander -$75 total medical   Vgo 30 -30 day --- $280s--$460s    Tsh suppressed, only on synthroid 25 mcg daily    notices that she is more sleepy at times.     Has interest in alexsander 3.   Lab Results   Component Value Date    TSH 2.070 01/10/2024     Lab Results   Component Value Date    HGBA1C 6.8 (H) 06/07/2024       Having memory deficit issues -more pronounced lately-short and long term    Exercises: cycle  a1c below goal   Trends over the past 2 years.   Doing well overall     Previous medications:   Trulicity  Novolog  Lantus   Metformin   Humalog  Vgo     CURRENT DIABETIC MEDS:  novolog Insulin pump 1.67 u/hr , 5 clicks before meals (10 units), self adjustment per  scale (insulin) 180-230+2, 231-280+4, 281-330+6, etc,  Metformin 1,000 mg daily, Trulicity 1.5 mg weekly-Sundays-need refill   Changes vgo in am    Cequr-on hold  Lantus or touejo  -will need rx if she goes with cequr    Takes synthroid at 7f260a each morning-dispensed levothyroxine- has not started, previously on brand synthroid   Uses Vials or Pens: Pens  Rarely missing doses of diabetes medications.     Type of Glucose Meter:  alexsander sensor -reader and fanny (alexsander 1)     On insulin pump, boluses 4x a day  Testing 4 x a day  Patient is willing and able to use the device  Demonstrated an understanding of the technology and is motivated to use CGM  Patient expected to adhere to a comprehensive diabetes treatment plan and patient has adequate medical supervision  Patient experiences multiple impaired awareness of hypoglycemia (hypoglycemia unawareness)    D/t pandemic (covid19) it is imperative for pt to have BG levels monitored closely for optimal health  Not currently consistently exercising, but has membership to [facility] [employer]    Diabetes Management Status    Statin: Taking  ACE/ARB: Taking    Screening or Prevention Patient's value Goal Complete/Controlled?   HgA1C Testing and Control   Lab Results   Component Value Date    HGBA1C 6.8 (H) 06/07/2024      Annually/Less than 8% Yes   Lipid profile : 04/27/2023 Annually Yes   LDL control Lab Results   Component Value Date    LDLCALC 70.2 04/27/2023    Annually/Less than 100 mg/dl  Yes   Nephropathy screening Lab Results   Component Value Date    LABMICR 52.0 08/31/2023     Lab Results   Component Value Date    PROTEINUA 2+ (A) 03/18/2021    Annually Yes   Blood pressure BP Readings from Last 1 Encounters:   05/31/24 (!) 142/72    Less than 140/90 Yes   Dilated retinal exam : 01/11/2024 Annually Yes   Foot exam   Most Recent Foot Exam Date: Not Found Annually Yes     REVIEW OF SYSTEMS  General: no weakness; c/o fatigue  Eyes: +vision changes- needs appt, no  "blurry vision or eye pain.    Cardiac: no chest pain or palpitations.   Respiratory: no cough or dyspnea. +allergies- on claritin  GI: no abdominal pain or nausea.   Skin: no rashes, wounds, or insulin injection site reactions.  Neuro: occ numbness or tingling; no dizziness, +memory loss, contusion to lower back-healing  Endocrine: no polyuria, polydipsia, polyphagia.   Psych: no anxiety or depression.   MS: chronic right hip    Vital Signs  /70 (BP Location: Left arm, Patient Position: Sitting, BP Method: Medium (Manual))   Pulse 77   Ht 5' 3" (1.6 m)   Wt 80.4 kg (177 lb 4 oz)   SpO2 97%   BMI 31.40 kg/m²     Hemoglobin A1C   Date Value Ref Range Status   06/07/2024 6.8 (H) 4.0 - 5.6 % Final     Comment:     ADA Screening Guidelines:  5.7-6.4%  Consistent with prediabetes  >or=6.5%  Consistent with diabetes    High levels of fetal hemoglobin interfere with the HbA1C  assay. Heterozygous hemoglobin variants (HbS, HgC, etc)do  not significantly interfere with this assay.   However, presence of multiple variants may affect accuracy.     01/10/2024 6.7 (H) 4.0 - 5.6 % Final     Comment:     ADA Screening Guidelines:  5.7-6.4%  Consistent with prediabetes  >or=6.5%  Consistent with diabetes    High levels of fetal hemoglobin interfere with the HbA1C  assay. Heterozygous hemoglobin variants (HbS, HgC, etc)do  not significantly interfere with this assay.   However, presence of multiple variants may affect accuracy.     08/31/2023 6.9 (H) 4.0 - 5.6 % Final     Comment:     ADA Screening Guidelines:  5.7-6.4%  Consistent with prediabetes  >or=6.5%  Consistent with diabetes    High levels of fetal hemoglobin interfere with the HbA1C  assay. Heterozygous hemoglobin variants (HbS, HgC, etc)do  not significantly interfere with this assay.   However, presence of multiple variants may affect accuracy.         Chemistry        Component Value Date/Time     04/27/2023 0725    K 4.3 04/27/2023 0725     " 04/27/2023 0725    CO2 30 (H) 04/27/2023 0725    BUN 13 04/27/2023 0725    CREATININE 0.8 04/27/2023 0725     (H) 04/27/2023 0725        Component Value Date/Time    CALCIUM 9.9 04/27/2023 0725    ALKPHOS 82 04/27/2023 0725    AST 21 04/27/2023 0725    ALT 15 04/27/2023 0725    BILITOT 1.0 04/27/2023 0725        Lab Results   Component Value Date    CHOL 150 04/27/2023    CHOL 170 01/11/2022    CHOL 156 05/12/2020     Lab Results   Component Value Date    HDL 57 04/27/2023    HDL 55 01/11/2022    HDL 49 05/12/2020     Lab Results   Component Value Date    LDLCALC 70.2 04/27/2023    LDLCALC 71.6 01/11/2022    LDLCALC 76.4 05/12/2020     Lab Results   Component Value Date    TRIG 114 04/27/2023    TRIG 217 (H) 01/11/2022    TRIG 153 (H) 05/12/2020     Lab Results   Component Value Date    TSH 2.070 01/10/2024     Lab Results   Component Value Date    MICALBCREAT 61.9 (H) 08/31/2023     PHYSICAL EXAMINATION  Constitutional: Appears well, alert, oriented.  Neck: Supple, trachea midline.   Respiratory:  even and unlabored.  Lymph: no edema.   Skin: warm and dry; no insulin site reactions observed.  Neuro: patient alert and cooperative.  Feet: appropriate footwear    Assessment and Plan    1. Diabetic peripheral neuropathy associated with type 2 diabetes mellitus  Hemoglobin A1C    Ambulatory referral/consult to Diabetes Education      2. Hyperlipidemia associated with type 2 diabetes mellitus  Lipid Panel      3. Hypertension associated with diabetes        4. Hypothyroidism, acquired        5. Obesity (BMI 30.0-34.9)          1-5. Follow up in 4 months w/ me   Briefly discussed omnipod dash, worried about price point, pdm/pod cost  Cost burdens-insurance switch  Vgo transition to cequr  Send lantus 36 units at night   Cequr 1-7 clicks before snacks, meals  1 click if sugar is >180  2 clicks if sugar is >230   Lab Results   Component Value Date    LDLCALC 70.2 04/27/2023     At goal   Lipid next time-pcp will  check 7/2024  Bp controlled   Lab Results   Component Value Date    TSH 2.070 01/10/2024     On lt4 -watch brand vs generic  Body mass index is 31.4 kg/m².  May increase insuiln resistance  A1c at goal  Goal less than 7.5%  Lab Results   Component Value Date    HGBA1C 6.8 (H) 06/07/2024

## 2024-06-10 ENCOUNTER — OFFICE VISIT (OUTPATIENT)
Dept: INTERNAL MEDICINE | Facility: CLINIC | Age: 74
End: 2024-06-10
Payer: MEDICARE

## 2024-06-10 ENCOUNTER — PATIENT MESSAGE (OUTPATIENT)
Dept: INTERNAL MEDICINE | Facility: CLINIC | Age: 74
End: 2024-06-10

## 2024-06-10 ENCOUNTER — TELEPHONE (OUTPATIENT)
Dept: DIABETES | Facility: CLINIC | Age: 74
End: 2024-06-10
Payer: MEDICARE

## 2024-06-10 VITALS
HEIGHT: 63 IN | WEIGHT: 177.25 LBS | DIASTOLIC BLOOD PRESSURE: 70 MMHG | HEART RATE: 77 BPM | OXYGEN SATURATION: 97 % | SYSTOLIC BLOOD PRESSURE: 132 MMHG | BODY MASS INDEX: 31.41 KG/M2

## 2024-06-10 DIAGNOSIS — E11.69 HYPERLIPIDEMIA ASSOCIATED WITH TYPE 2 DIABETES MELLITUS: Chronic | ICD-10-CM

## 2024-06-10 DIAGNOSIS — E66.9 OBESITY (BMI 30.0-34.9): ICD-10-CM

## 2024-06-10 DIAGNOSIS — E11.59 HYPERTENSION ASSOCIATED WITH DIABETES: Chronic | ICD-10-CM

## 2024-06-10 DIAGNOSIS — E03.9 HYPOTHYROIDISM, ACQUIRED: Chronic | ICD-10-CM

## 2024-06-10 DIAGNOSIS — E11.42 DIABETIC PERIPHERAL NEUROPATHY ASSOCIATED WITH TYPE 2 DIABETES MELLITUS: Primary | Chronic | ICD-10-CM

## 2024-06-10 DIAGNOSIS — I15.2 HYPERTENSION ASSOCIATED WITH DIABETES: Chronic | ICD-10-CM

## 2024-06-10 DIAGNOSIS — E78.5 HYPERLIPIDEMIA ASSOCIATED WITH TYPE 2 DIABETES MELLITUS: Chronic | ICD-10-CM

## 2024-06-10 PROCEDURE — 99999 PR PBB SHADOW E&M-EST. PATIENT-LVL V: CPT | Mod: PBBFAC,,, | Performed by: NURSE PRACTITIONER

## 2024-06-10 PROCEDURE — 95251 CONT GLUC MNTR ANALYSIS I&R: CPT | Mod: ,,, | Performed by: NURSE PRACTITIONER

## 2024-06-10 PROCEDURE — 99214 OFFICE O/P EST MOD 30 MIN: CPT | Mod: S$PBB,,, | Performed by: NURSE PRACTITIONER

## 2024-06-10 PROCEDURE — 99215 OFFICE O/P EST HI 40 MIN: CPT | Mod: PBBFAC | Performed by: NURSE PRACTITIONER

## 2024-06-10 RX ORDER — DIABETIC SUPPLIES,MISCELL
MISCELLANEOUS MISCELLANEOUS
Qty: 1 EACH | Refills: 1 | Status: SHIPPED | OUTPATIENT
Start: 2024-06-10

## 2024-06-10 RX ORDER — INSULIN GLARGINE 100 [IU]/ML
INJECTION, SOLUTION SUBCUTANEOUS
Qty: 30 ML | Refills: 3 | Status: SHIPPED | OUTPATIENT
Start: 2024-06-10

## 2024-06-10 RX ORDER — PEN NEEDLE, DIABETIC 30 GX3/16"
NEEDLE, DISPOSABLE MISCELLANEOUS
Qty: 100 EACH | Refills: 3 | Status: SHIPPED | OUTPATIENT
Start: 2024-06-10

## 2024-06-10 RX ORDER — BOLUS INSULIN PUMP, 200 UNIT 2 UNIT
EACH MISCELLANEOUS
Qty: 30 EACH | Refills: 3 | Status: SHIPPED | OUTPATIENT
Start: 2024-06-10

## 2024-06-10 NOTE — PATIENT INSTRUCTIONS
Follow up in 4 months w/Irielle  A1c prior-4 months   DE education-Kalie annr and lantus    Lab Results   Component Value Date    HGBA1C 6.8 (H) 06/07/2024     Goal less than 7%    Jerald 3     Cequr 5 clicks before meals  1-2 clicks w/ snacks  1 click if sugar is > 180  2 clicks if sugar is >230   Lantus 36 units at night     [email or web address]  Eat fit fanny  Myfitnesspal fanny  [email or web address]    mySugr fanny     Goal  no higher than 180/200

## 2024-06-11 NOTE — PROGRESS NOTES
Diabetes Care Specialist Progress Note  Author: Kalie Enrique RD  Date: 6/11/2024    Program Intake  Reason for Diabetes Program Visit:: Intervention  Type of Intervention:: Individual  Individual: Education  Education: Other (Review insulin delivery devices)  Current diabetes risk level:: low  In the last 12 months, have you:: none  Permission to speak with others about care:: yes (Pt  in office during appt)  Continuous Glucose Monitoring  Patient has CGM: Yes  Personal CGM type:: Freestyle Jerald 3  GMI Date: 06/07/24  GMI Value: 6.9 %    Lab Results   Component Value Date    HGBA1C 6.8 (H) 06/07/2024     Clinical      There is no height or weight on file to calculate BMI.    Clinical Assessment  Current Diabetes Treatment: Oral Medication, Insulin, Insulin pump    Medication Information  Medication adherence impacting ability to self-manage diabetes?: No    Labs  Do you have regular lab work to monitor your medications?: Yes  Type of Regular Lab Work: A1c, Cholesterol, Microalbumin, CBC, BMP  Lab Compliance Barriers: No    Additional Social History    Support  Does anyone support you with your diabetes care?: yes  Who supports you?: self, spouse  Who takes you to your medical appointments?: self  Does the current support meet the patient's needs?: Yes  Is Support an area impacting ability to self-manage diabetes?: No    Access to Mass Media & Technology  Does the patient have access to any of the following devices or technologies?: Smart phone  Media or technology needs impacting ability to self-manage diabetes?: No    Cognitive/Behavioral Health  Alert and Oriented: Yes  Difficulty Thinking: No  Requires Prompting: No  Requires assistance for routine expression?: No  Cognitive or behavioral barriers impacting ability to self-manage diabetes?: No    Communication  Language preference: English  Hearing Problems: No  Vision Problems: Yes  Vision problem type:: Decreased Vision  Vision Assistance:  Glasses  Communication needs impacting ability to self-manage diabetes?: No    Health Literacy  Preferred Learning Method: Face to Face, Demonstration  Health literacy needs impacting ability to self-manage diabetes?: No    Diabetes Self-Management Skills Assessment    Diabetes Disease Process/Treatment Options  Diabetes Disease Process/Treatment Options: Skills Assessment Completed: No  Deferred due to:: Time  Area of need?: Deferred    Nutrition/Healthy Eating  Nutrition/Healthy Eating Skills Assessment Completed:: No  Deffered due to:: Time  Area of need?: Deferred    Physical Activity/Exercise  Physical Activity/Exercise Skills Assessment Completed: : No  Deffered due to:: Time  Area of need?: Deferred    Medications  Patient is able to describe current diabetes management routine.: yes  Diabetes management routine:: insulin, insulin pump, oral medications  Patient is able to identify current diabetes medications, dosages, and appropriate timing of medications.: yes (V-Go: 5 clicks before meals, 1-2 clicks before snacks, Metformin)  Patient reports problems or concerns with current medication regimen.: yes  Medication regimen problems/concerns:: financial concerns  Assessment indicates:: Instruction Needed  Area of need?: Yes    Home Blood Glucose Monitoring  Patient states that blood sugar is checked at home daily.: yes  Monitoring Method:: personal continuous glucose monitor  Personal CGM type:: Freestyle Jerald 3  CGM Report reviewed?: yes  Home Blood Glucose Monitoring Skills Assessment Completed: : No  Deferred due to:: Time  Area of need?: Deferred    Acute Complications  Acute Complications Skills Assessment Completed: : No  Deffered due to:: Time  Area of need?: Deferred    Chronic Complications  Patient is taking statin?: Yes  Chronic Complications Skills Assessment Completed: : No  Deferred due to:: Time  Area of need?: Deferred    Psychosocial/Coping  Psychosocial/Coping Skills Assessment Completed: :  No  Deffered due to:: Time  Area of need?: Deferred    Assessment Summary and Plan    Based on today's diabetes care assessment, the following areas of need were identified:          6/7/2024    12:02 AM   Social   Support No   Access to Mass Media/Tech No   Cognitive/Behavioral Health No   Communication No   Health Literacy No            6/7/2024    12:02 AM   Clinical   Medication Adherence No   Lab Compliance No            6/7/2024    12:02 AM   Diabetes Self-Management Skills   Diabetes Disease Process/Treatment Options Deferred   Nutrition/Healthy Eating Deferred   Physical Activity/Exercise Deferred   Medication Yes, see care plan.   Home Blood Glucose Monitoring Deferred   Acute Complications Deferred   Chronic Complications Deferred   Psychosocial/Coping Deferred      Today's interventions were provided through individual discussion, instruction, and written materials were provided.      Patient verbalized understanding of instruction and written materials.  Pt was able to return back demonstration of instructions today. Patient understood key points, needs reinforcement and further instruction.     Diabetes Self-Management Care Plan:    Today's Diabetes Self-Management Care Plan was developed with Linda's input. Linda has agreed to work toward the following goal(s) to improve his/her overall diabetes control.      Care Plan: Diabetes Management   Updates made since 5/12/2024 12:00 AM        Problem: Medications         Goal: Patient Agrees to take Diabetes Medications as prescribed.    Start Date: 2/29/2024   Expected End Date: 9/11/2024   This Visit's Progress: No change   Recent Progress: Deferred   Priority: High   Barriers: Financial   Note:    Pt reports concern with affordability of V-Go 2/2 insurance. Educator reviewed features of Omnipod DASH with pt and discussed other options of insulin delivery. Educator messaged NP.    6/7/24: Pt reports financial concerns with V-Go 2/2 insurance. Educator  reviewed other insulin delivery devices and encouraged pt to reach out to insurance company for coverage.       Jerald report uploaded into media manager.    Follow Up Plan     Follow up Once pt decides on insulin delivery device.    Today's care plan and follow up schedule was discussed with patient.  Linda verbalized understanding of the care plan, goals, and agrees to follow up plan.        The patient was encouraged to communicate with his/her health care provider/physician and care team regarding his/her condition(s) and treatment.  I provided the patient with my contact information today and encouraged to contact me via phone or [facility] as needed.     Length of Visit   Total Time: 30 Minutes

## 2024-06-12 PROBLEM — R52 PAIN: Status: ACTIVE | Noted: 2024-06-12

## 2024-06-12 PROBLEM — R53.1 WEAKNESS: Status: ACTIVE | Noted: 2024-06-12

## 2024-06-24 ENCOUNTER — CLINICAL SUPPORT (OUTPATIENT)
Dept: DIABETES | Facility: CLINIC | Age: 74
End: 2024-06-24
Payer: MEDICARE

## 2024-06-24 DIAGNOSIS — E11.42 DIABETIC PERIPHERAL NEUROPATHY ASSOCIATED WITH TYPE 2 DIABETES MELLITUS: Chronic | ICD-10-CM

## 2024-06-24 PROCEDURE — G0108 DIAB MANAGE TRN  PER INDIV: HCPCS | Mod: PBBFAC,PO | Performed by: DIETITIAN, REGISTERED

## 2024-06-24 PROCEDURE — 99999 PR PBB SHADOW E&M-EST. PATIENT-LVL I: CPT | Mod: PBBFAC,,, | Performed by: DIETITIAN, REGISTERED

## 2024-06-24 PROCEDURE — 99999PBSHW PR PBB SHADOW TECHNICAL ONLY FILED TO HB: Mod: PBBFAC,,,

## 2024-06-24 PROCEDURE — 99211 OFF/OP EST MAY X REQ PHY/QHP: CPT | Mod: PBBFAC,PO | Performed by: DIETITIAN, REGISTERED

## 2024-06-24 NOTE — PROGRESS NOTES
Diabetes Care Specialist Progress Note  Author: Oanh Raygoza RD  Date: 6/24/2024    Program Intake  Reason for Diabetes Program Visit:: Intervention  Individual: Device Training  Device Training: Insulin Pump Start  Current diabetes risk level:: low  In the last 12 months, have you:: none  Permission to speak with others about care:: yes ( Juan Jose)  Continuous Glucose Monitoring  Patient has CGM: No (prescription ran out)  Personal CGM type:: Freestyle Jerald 3    Lab Results   Component Value Date    HGBA1C 6.8 (H) 06/07/2024       Clinical    There is no height or weight on file to calculate BMI.    Clinical Assessment  Current Diabetes Treatment: Injectable, Insulin  Have you ever experienced hypoglycemia (low blood sugar)?: no  Have you ever experienced hyperglycemia (high blood sugar)?: no    Medication Information  How do you obtain your medications?: Family picks up  How many days a week do you miss your medications?: Never  Do you sometimes have difficulty refilling your medications?: No  Medication adherence impacting ability to self-manage diabetes?: No    Labs  Do you have regular lab work to monitor your medications?: Yes  Type of Regular Lab Work: A1c, BMP, Cholesterol  Lab Compliance Barriers: No         Additional Social History    Support  Does anyone support you with your diabetes care?: yes    Access to Mass Media & Technology  Does the patient have access to any of the following devices or technologies?: Smart phone    Health Literacy  Preferred Learning Method: Face to Face, Demonstration      Diabetes Self-Management Skills Assessment    Diabetes Disease Process/Treatment Options  Deferred due to:: Time  Area of need?: Deferred    Nutrition/Healthy Eating  Deffered due to:: Time  Area of need?: Deferred    Physical Activity/Exercise  Deffered due to:: Time  Area of need?: Deferred    Medications  Patient is able to describe current diabetes management routine.: yes  Diabetes management  routine:: insulin, insulin pump, oral medications  Patient is able to identify current diabetes medications, dosages, and appropriate timing of medications.: yes (V-Go: 5 clicks before meals, 1-2 clicks before snacks, Metformin)  Patient reports problems or concerns with current medication regimen.: yes  Medication regimen problems/concerns:: financial concerns  Assessment indicates:: Instruction Needed  Area of need?: Yes    Home Blood Glucose Monitoring  Patient states that blood sugar is checked at home daily.: yes  Monitoring Method:: personal continuous glucose monitor  Personal CGM type:: Freestyle Jerald 3  Deferred due to:: Time  Area of need?: Deferred    Acute Complications  Deffered due to:: Time  Area of need?: Deferred    Chronic Complications  Patient is taking statin?: Yes  Deferred due to:: Time  Area of need?: Deferred    Psychosocial/Coping  Deffered due to:: Time  Area of need?: Deferred      Assessment Summary and Plan    Based on today's diabetes care assessment, the following areas of need were identified:          6/7/2024    12:02 AM   Social   Support No   Access to Mass Media/Tech No   Cognitive/Behavioral Health No   Communication No   Health Literacy No            6/24/2024    12:02 AM   Clinical   Medication Adherence No   Lab Compliance No            6/24/2024    12:02 AM   Diabetes Self-Management Skills   Diabetes Disease Process/Treatment Options Deferred   Nutrition/Healthy Eating Deferred   Physical Activity/Exercise Deferred   Medication Yes, see care plan below   Home Blood Glucose Monitoring Deferred   Acute Complications Deferred   Chronic Complications Deferred   Psychosocial/Coping Deferred          Today's interventions were provided through individual discussion, instruction, and written materials were provided.      Patient verbalized understanding of instruction and written materials.  Pt was able to return back demonstration of instructions today. Patient understood key  points, needs reinforcement and further instruction.     Diabetes Self-Management Care Plan:    Today's Diabetes Self-Management Care Plan was developed with Linda's input. Linda has agreed to work toward the following goal(s) to improve his/her overall diabetes control.      Care Plan: Diabetes Management   Updates made since 5/25/2024 12:00 AM        Problem: Medications         Goal: Patient Agrees to take Diabetes Medications as prescribed.    Start Date: 2/29/2024   Expected End Date: 9/11/2024   Recent Progress: No change   Priority: High   Barriers: Financial   Note:        2/29/24: Pt reports concern with affordability of V-Go 2/2 insurance. Educator reviewed features of Omnipod DASH with pt and discussed other options of insulin delivery. Educator messaged NP.    6/7/24: Pt reports financial concerns with V-Go 2/2 insurance. Educator reviewed other insulin delivery devices and encouraged pt to reach out to insurance company for coverage.    6/24/24: Pt to start Cequr;  Juan Jose present.    CEQUR 3- Day Insulin Patch Training:    Pt here today for training on Cequr insulin delivery device which will provide up to 200 units of on-demand bolus dosing in 2-unit increments.    Patient will only be using Humalog insulin in the Cequr system. He will be giving 5 clicks (10 units) at each meal and 2 clicks at each snack.    Reviewed that pt should DISCONTINUE the following diabetes medications: VGO  Reviewed that pt should also CONTINUE the following diabetes medications: Lantus 36u QHS    Training was provided per Cequr instructions for use guide.    Patient was instructed on the following on how to fill Cequr device , including priming of device and how to appropriately place to injection site. Explain 2 units of insulin is delivered each time the two buttons have been properly squeezed and a click is heard. Explain that squeezing one button will not deliver any insulin  Patient was able to perform successful  return demonstration of all.      All of patient's questions and concerns were addressed. Patient instructed to keep a BG log (log sheets provided) and send BG readings to me or prescribing provider within 1 week for review.    Phone number was provided and patient advised to call with any questions or concerns.   Patient verbalizes understanding of received information/education.             Follow Up Plan     Follow up in about 1 month (around 7/24/2024). Pt will call to schedule next appt. Review use of Cequr, review BG log.    Today's care plan and follow up schedule was discussed with patient.  Linda verbalized understanding of the care plan, goals, and agrees to follow up plan.        The patient was encouraged to communicate with his/her health care provider/physician and care team regarding his/her condition(s) and treatment.  I provided the patient with my contact information today and encouraged to contact me via phone or [facility] Patient Portal as needed.     Length of Visit   Total Time: 60 Minutes

## 2024-06-25 ENCOUNTER — TELEPHONE (OUTPATIENT)
Dept: DIABETES | Facility: CLINIC | Age: 74
End: 2024-06-25
Payer: MEDICARE

## 2024-06-25 RX ORDER — FLASH GLUCOSE SENSOR
KIT MISCELLANEOUS
Qty: 7 KIT | Refills: 3 | Status: SHIPPED | OUTPATIENT
Start: 2024-06-25

## 2024-06-25 NOTE — TELEPHONE ENCOUNTER
----- Message from Oanh Raygoza RD sent at 6/25/2024  7:36 AM CDT -----  Oops! Sorry -  I do need one more thing for this pt. She needs a refill on Freestyle Jerald and asked for the prescription to be sent [facility]. Thank you!  ----- Message -----  From: Becca Nuñez NP  Sent: 6/24/2024  10:32 PM CDT  To: Kalie Enrique RD; Oanh Raygoza RD    Hey   I am covering for [facility].    I believe this patient met with Oanh today because Oanh sent a messasge about this patient   Is anything needed?     Thanks  Becca  ----- Message -----  From: Kalie Enrique RD  Sent: 6/24/2024   4:17 PM CDT  To: LOVE Dailey FNP      ----- Message -----  From: J Luis Stevens APRN, FNP  Sent: 6/12/2024   4:22 PM CDT  To: Kalie Enrique RD    Okay-is an authorization pending or awaiting shipment from mail order?  Cequr and lantus  Keep me posted,  J Luis  ----- Message -----  From: Kalie Enrique RD  Sent: 6/12/2024   3:25 PM CDT  To: LOVE Dailey FNP    One of our schedulers called yesterday and she's waiting to get her pump before scheduling.  ThanksKalie  ----- Message -----  From: J Luis Stevens APRN, FNP  Sent: 6/10/2024   9:37 AM CDT  To: Rob Phillips MD; Kalie Enrique RD    Switching to cequr /lantus from [facility]- awaiting [facility]  Doing well

## 2024-07-12 ENCOUNTER — OFFICE VISIT (OUTPATIENT)
Dept: INTERNAL MEDICINE | Facility: CLINIC | Age: 74
End: 2024-07-12
Payer: MEDICARE

## 2024-07-12 VITALS
SYSTOLIC BLOOD PRESSURE: 114 MMHG | BODY MASS INDEX: 31.25 KG/M2 | OXYGEN SATURATION: 96 % | WEIGHT: 176.38 LBS | HEIGHT: 63 IN | HEART RATE: 94 BPM | DIASTOLIC BLOOD PRESSURE: 62 MMHG

## 2024-07-12 DIAGNOSIS — G89.29 CHRONIC LEFT SHOULDER PAIN: Primary | ICD-10-CM

## 2024-07-12 DIAGNOSIS — I15.2 HYPERTENSION ASSOCIATED WITH DIABETES: ICD-10-CM

## 2024-07-12 DIAGNOSIS — E11.59 HYPERTENSION ASSOCIATED WITH DIABETES: ICD-10-CM

## 2024-07-12 DIAGNOSIS — M25.512 CHRONIC LEFT SHOULDER PAIN: Primary | ICD-10-CM

## 2024-07-12 DIAGNOSIS — Z79.4 TYPE 2 DIABETES MELLITUS WITH DIABETIC POLYNEUROPATHY, WITH LONG-TERM CURRENT USE OF INSULIN: ICD-10-CM

## 2024-07-12 DIAGNOSIS — E11.42 TYPE 2 DIABETES MELLITUS WITH DIABETIC POLYNEUROPATHY, WITH LONG-TERM CURRENT USE OF INSULIN: ICD-10-CM

## 2024-07-12 PROCEDURE — 99215 OFFICE O/P EST HI 40 MIN: CPT | Mod: PBBFAC | Performed by: INTERNAL MEDICINE

## 2024-07-12 PROCEDURE — 99999 PR PBB SHADOW E&M-EST. PATIENT-LVL V: CPT | Mod: PBBFAC,,, | Performed by: INTERNAL MEDICINE

## 2024-07-12 NOTE — PROGRESS NOTES
Subjective:       Patient ID: Linda Fong is a 74 y.o. female.    Chief Complaint: Follow-up      HPI  Linda Fong is a 74 y.o. year old female established patient with medical history below presents for follow up.    Presents today for follow up from left shoulder pain. Had undergone PT with great results. Feels that her range of motion has returned. Does have slight issues with abduction which she is working on still with home exercise program.    Review of Systems   Constitutional:  Negative for activity change, appetite change, fatigue, fever and unexpected weight change.   HENT:  Negative for congestion, hearing loss, postnasal drip, sneezing, sore throat, trouble swallowing and voice change.    Eyes:  Negative for pain and discharge.   Respiratory:  Negative for cough, choking, chest tightness, shortness of breath and wheezing.    Cardiovascular:  Negative for chest pain, palpitations and leg swelling.   Gastrointestinal:  Negative for abdominal distention, abdominal pain, blood in stool, constipation, diarrhea, nausea and vomiting.   Endocrine: Negative for polydipsia and polyuria.   Genitourinary:  Negative for difficulty urinating and flank pain.   Musculoskeletal:  Negative for arthralgias, back pain, joint swelling, myalgias and neck pain.   Skin:  Negative for rash.   Neurological:  Negative for dizziness, tremors, seizures, weakness, numbness and headaches.   Psychiatric/Behavioral:  Negative for agitation. The patient is not nervous/anxious.              Past Medical History:   Diagnosis Date    Altered mental status 1/5/2019    CVA (cerebral vascular accident) 1/5/2019    Diabetic nephropathy     DJD (degenerative joint disease)     Goiter     HTN (hypertension)     Hyperlipidemia     Intracranial bleeding     12/10    Osteopenia     PN (peripheral neuropathy)     Spell of altered cognition 5/19/2014    TIA (transient ischemic attack)     Traumatic brain injury Dec 2010    Type II or unspecified  type diabetes mellitus with renal manifestations, uncontrolled(250.42)         Prior to Admission medications    Medication Sig Start Date End Date Taking? Authorizing Provider   ascorbic acid, vitamin C, (VITAMIN C) 100 MG tablet Take 100 mg by mouth once daily.   Yes Provider, Historical   aspirin (ECOTRIN) 81 MG EC tablet Take 81 mg by mouth once daily.   Yes Provider, Historical   atorvastatin (LIPITOR) 20 MG tablet Take 1 tablet (20 mg total) by mouth once daily. 5/1/23  Yes J Luis Stevens APRN, FNP   biotin 1 mg Cap Take 1 capsule by mouth once daily.   Yes Provider, Historical   blood sugar diagnostic Strp Accuchek guide meter. To check BG 3  times daily, to use with insurance preferred meter, e 11.65 8/24/22  Yes J Luis Stevens APRN, FNP   bolus insulin pump, 200 unit (CEQUR SIMPLICITY) 2 unit Mariel Use as directed, 1-7 clicks before meals, snacks up to 4x a day. 6/10/24  Yes J Luis Stevens APRN, FNP   diabetic supplies, miscellan. (CEQUR SIMPLICITY ) Misc Use as directed. 6/10/24  Yes J Luis Stevens APRN, FNP   dulaglutide (TRULICITY) 1.5 mg/0.5 mL pen injector Inject 1.5 mg into the skin every 7 days. 1/25/24  Yes J Luis Stevens APRN, MILAP   ergocalciferol (ERGOCALCIFEROL) 50,000 unit Cap TAKE 1 CAPSULE EVERY 30    DAYS 6/6/23  Yes Rob Phillips MD   flash glucose sensor (FREESTYLE SIDNEY 14 DAY SENSOR) Kit Change every 14 days e 11.65 6/25/24  Yes Becca Nuñez, NP   insulin aspart U-100 (NOVOLOG U-100 INSULIN ASPART) 100 unit/mL injection Uses vgo 40, max daily 130 units, 12 vials per 90 days, 6 clicks with meals, correction scale 180-230+2, 231-280+4, 281-330+6, 331-380+8, >380+10. 1/25/24  Yes J Luis Stevens APRN, MILAP   insulin glargine U-100, Lantus, (LANTUS SOLOSTAR U-100 INSULIN) 100 unit/mL (3 mL) InPn pen Inject 36 units at night 6/10/24  Yes J Luis Stevens, APRN, FNP   lancets Misc To check BG 3  times daily, to use with insurance preferred meter, e 11.65 8/10/21   "Yes J Luis Stevens APRN, FNP   levothyroxine (SYNTHROID) 25 MCG tablet Take 1 tablet in the morning on an empty stomach, water only, wait 30-45 minutes before eating, drinking or taking other medications. 2/27/24  Yes J Luis Stevens APRN, FNP   losartan-hydrochlorothiazide 50-12.5 mg (HYZAAR) 50-12.5 mg per tablet Take 1 tablet by mouth once daily. 5/1/23  Yes J Luis Stevens APRN, FNP   metFORMIN (GLUCOPHAGE) 1000 MG tablet Take 1 tablet daily 5/2/23  Yes J Luis Stevens APRN, FNP   multivitamin capsule Take 1 capsule by mouth once daily.   Yes Provider, Historical   omega-3s/dha/epa/fish oil/D3 (VITAMIN-D + OMEGA-3 ORAL) Take by mouth.   Yes Provider, Historical   pen needle, diabetic (BD GABI 2ND GEN PEN NEEDLE) 32 gauge x 5/32" Ndle Use 1x a day with lantus 6/10/24  Yes J Luis Stevens APRN, FNP   sub-q insulin device, 30 unit (V-GO 30) Mariel Use daily. 1/25/24  Yes J Luis Stevens APRN, FNP   sub-q insulin device, 30 unit (V-GO 30) Mariel Change daily 1/25/24  Yes J Luis Stevens APRN, FNP   zinc gluconate 50 mg tablet Take 50 mg by mouth once daily.   Yes Provider, Historical   blood-glucose meter kit Accuchek guide meter. To check BG 3  times daily, to use with insurance preferred meter, e 11.65 8/24/22 6/10/24  J Luis Stevens APRN, FNP        Past medical history, surgical history, and family medical history reviewed and updated as appropriate.    Medications and allergies reviewed.     Objective:          Vitals:    07/12/24 0955   BP: 114/62   BP Location: Right arm   Patient Position: Sitting   Pulse: 94   SpO2: 96%   Weight: 80 kg (176 lb 5.9 oz)   Height: 5' 3" (1.6 m)     Body mass index is 31.24 kg/m².  Physical Exam  Constitutional:       Appearance: She is well-developed.   HENT:      Head: Normocephalic and atraumatic.   Eyes:      Extraocular Movements: Extraocular movements intact.   Cardiovascular:      Rate and Rhythm: Normal rate and regular rhythm.      Heart sounds: " Normal heart sounds.   Pulmonary:      Effort: Pulmonary effort is normal. No respiratory distress.      Breath sounds: Normal breath sounds. No wheezing.   Abdominal:      General: Bowel sounds are normal. There is no distension.      Palpations: Abdomen is soft.      Tenderness: There is no abdominal tenderness.   Musculoskeletal:         General: No tenderness. Normal range of motion.      Cervical back: Normal range of motion.   Skin:     General: Skin is warm and dry.   Neurological:      Mental Status: She is alert and oriented to person, place, and time.      Cranial Nerves: No cranial nerve deficit.      Deep Tendon Reflexes: Reflexes are normal and symmetric.         Lab Results   Component Value Date    WBC 8.24 07/03/2024    HGB 12.9 07/03/2024    HCT 39.2 07/03/2024     07/03/2024    CHOL 140 07/03/2024    TRIG 116 07/03/2024    HDL 57 07/03/2024    ALT 15 07/03/2024    AST 17 07/03/2024     07/03/2024    K 4.1 07/03/2024     07/03/2024    CREATININE 0.8 07/03/2024    BUN 13 07/03/2024    CO2 28 07/03/2024    TSH 2.070 01/10/2024    INR 1.0 01/05/2019    HGBA1C 6.7 (H) 07/03/2024       Assessment:       1. Chronic left shoulder pain    2. Type 2 diabetes mellitus with diabetic polyneuropathy, with long-term current use of insulin    3. Hypertension associated with diabetes          Plan:     Linda was seen today for follow-up.    Diagnoses and all orders for this visit:    Chronic left shoulder pain  Comments:  improved significantly with PT.    Type 2 diabetes mellitus with diabetic polyneuropathy, with long-term current use of insulin  Comments:  A1c 6.7, at goal, no changes to current management    Hypertension associated with diabetes  Comments:  controlled, no changes to current management    Doing well, no further needs at this time. Range of motion intact, to continue working with PT.    Follow up in about 6 months (around 1/12/2025).    Rob Phillips MD  Internal Medicine /  Primary Care  [facility]  7/12/2024

## 2024-08-20 ENCOUNTER — TELEPHONE (OUTPATIENT)
Dept: INTERNAL MEDICINE | Facility: CLINIC | Age: 74
End: 2024-08-20
Payer: MEDICARE

## 2024-08-20 DIAGNOSIS — E11.42 DIABETIC PERIPHERAL NEUROPATHY ASSOCIATED WITH TYPE 2 DIABETES MELLITUS: Chronic | ICD-10-CM

## 2024-08-20 DIAGNOSIS — E03.8 SUBCLINICAL HYPOTHYROIDISM: ICD-10-CM

## 2024-08-20 RX ORDER — ATORVASTATIN CALCIUM 20 MG/1
20 TABLET, FILM COATED ORAL DAILY
Qty: 90 TABLET | Refills: 3 | Status: SHIPPED | OUTPATIENT
Start: 2024-08-20

## 2024-08-20 RX ORDER — ATORVASTATIN CALCIUM 20 MG/1
20 TABLET, FILM COATED ORAL DAILY
Qty: 90 TABLET | Refills: 3 | Status: CANCELLED | OUTPATIENT
Start: 2024-08-20

## 2024-08-20 RX ORDER — LOSARTAN POTASSIUM AND HYDROCHLOROTHIAZIDE 12.5; 5 MG/1; MG/1
1 TABLET ORAL DAILY
Qty: 90 TABLET | Refills: 3 | Status: CANCELLED | OUTPATIENT
Start: 2024-08-20

## 2024-08-20 RX ORDER — LOSARTAN POTASSIUM AND HYDROCHLOROTHIAZIDE 12.5; 5 MG/1; MG/1
1 TABLET ORAL DAILY
Qty: 90 TABLET | Refills: 3 | Status: SHIPPED | OUTPATIENT
Start: 2024-08-20

## 2024-08-20 NOTE — TELEPHONE ENCOUNTER
Spoke to pt. Pt requested refills for her atorvastatin and hydrochlorothiazide to be sent to [facility].    Pt also stated her Cequr will be up for a refill in a few weeks however, it will cost $1,200. Pt does not want to spend that. She is asking what Ms Stevens recommends    Alternatives Discussed Intro (Do Not Add Period): I discussed alternative treatments to Mohs surgery and specifically discussed the risks and benefits of

## 2024-08-20 NOTE — TELEPHONE ENCOUNTER
"Spoke to pt. Pt stated her  "is at 100%" and is [facility] She asked how much would the flexpens cost if she went through [facility]. She is confused and does not know which route to take. She is asking for assistance in finding the best and most cost effective route to take with her insulin.   "

## 2024-08-20 NOTE — TELEPHONE ENCOUNTER
----- Message from Cherelle Lu sent at 8/20/2024 10:41 AM CDT -----  Contact: Patient  [phone]  [email or web address]     Patient is calling for Medical Advice regarding:    Patient wants a call back or thru [facility]:Call back     Comments:Pt would like to speck to nurse so nurse can speck with doctor    Please advise patient replies from provider may take up to 48 hours.

## 2024-08-28 ENCOUNTER — PATIENT MESSAGE (OUTPATIENT)
Dept: DIABETES | Facility: CLINIC | Age: 74
End: 2024-08-28
Payer: MEDICARE

## 2024-08-29 RX ORDER — INSULIN GLARGINE 100 [IU]/ML
INJECTION, SOLUTION SUBCUTANEOUS
Qty: 30 ML | Refills: 3 | Status: SHIPPED | OUTPATIENT
Start: 2024-08-29

## 2024-08-29 RX ORDER — INSULIN ASPART 100 [IU]/ML
INJECTION, SOLUTION INTRAVENOUS; SUBCUTANEOUS
Qty: 45 ML | Refills: 3 | Status: SHIPPED | OUTPATIENT
Start: 2024-08-29

## 2024-09-25 ENCOUNTER — PATIENT MESSAGE (OUTPATIENT)
Dept: DIABETES | Facility: CLINIC | Age: 74
End: 2024-09-25
Payer: MEDICARE

## 2024-10-04 ENCOUNTER — TELEPHONE (OUTPATIENT)
Dept: INTERNAL MEDICINE | Facility: CLINIC | Age: 74
End: 2024-10-04
Payer: MEDICARE

## 2024-10-04 NOTE — TELEPHONE ENCOUNTER
----- Message from Zuleika sent at 10/4/2024  1:06 PM CDT -----  Contact: Self/ [phone]  Reason for the appointment:  labs and follow up     Patient preference of timeframe to be scheduled:      Would the patient like a call back, or a response through their [facility] portal?:   call back     Comments:   pt said that she needs to reschedule her labs and Ov

## 2024-10-15 ENCOUNTER — PATIENT MESSAGE (OUTPATIENT)
Dept: DIABETES | Facility: CLINIC | Age: 74
End: 2024-10-15
Payer: MEDICARE

## 2024-10-28 ENCOUNTER — PATIENT MESSAGE (OUTPATIENT)
Dept: PRIMARY CARE CLINIC | Facility: CLINIC | Age: 74
End: 2024-10-28
Payer: MEDICARE

## 2024-11-15 ENCOUNTER — LAB VISIT (OUTPATIENT)
Dept: LAB | Facility: HOSPITAL | Age: 74
End: 2024-11-15
Attending: NURSE PRACTITIONER
Payer: MEDICARE

## 2024-11-15 ENCOUNTER — OFFICE VISIT (OUTPATIENT)
Dept: INTERNAL MEDICINE | Facility: CLINIC | Age: 74
End: 2024-11-15
Payer: MEDICARE

## 2024-11-15 VITALS
HEIGHT: 63 IN | SYSTOLIC BLOOD PRESSURE: 124 MMHG | OXYGEN SATURATION: 98 % | DIASTOLIC BLOOD PRESSURE: 72 MMHG | WEIGHT: 178.13 LBS | BODY MASS INDEX: 31.56 KG/M2 | HEART RATE: 95 BPM

## 2024-11-15 DIAGNOSIS — I15.2 HYPERTENSION ASSOCIATED WITH DIABETES: Chronic | ICD-10-CM

## 2024-11-15 DIAGNOSIS — E11.42 DIABETIC PERIPHERAL NEUROPATHY ASSOCIATED WITH TYPE 2 DIABETES MELLITUS: Chronic | ICD-10-CM

## 2024-11-15 DIAGNOSIS — E66.811 OBESITY (BMI 30.0-34.9): ICD-10-CM

## 2024-11-15 DIAGNOSIS — E11.69 HYPERLIPIDEMIA ASSOCIATED WITH TYPE 2 DIABETES MELLITUS: Chronic | ICD-10-CM

## 2024-11-15 DIAGNOSIS — E78.5 HYPERLIPIDEMIA ASSOCIATED WITH TYPE 2 DIABETES MELLITUS: Chronic | ICD-10-CM

## 2024-11-15 DIAGNOSIS — E11.42 TYPE 2 DIABETES MELLITUS WITH DIABETIC POLYNEUROPATHY, WITH LONG-TERM CURRENT USE OF INSULIN: Primary | Chronic | ICD-10-CM

## 2024-11-15 DIAGNOSIS — Z79.4 TYPE 2 DIABETES MELLITUS WITH DIABETIC POLYNEUROPATHY, WITH LONG-TERM CURRENT USE OF INSULIN: Primary | Chronic | ICD-10-CM

## 2024-11-15 DIAGNOSIS — E55.9 VITAMIN D DEFICIENCY DISEASE: ICD-10-CM

## 2024-11-15 DIAGNOSIS — E11.59 HYPERTENSION ASSOCIATED WITH DIABETES: Chronic | ICD-10-CM

## 2024-11-15 LAB
CHOLEST SERPL-MCNC: 174 MG/DL (ref 120–199)
CHOLEST/HDLC SERPL: 3 {RATIO} (ref 2–5)
ESTIMATED AVG GLUCOSE: 163 MG/DL (ref 68–131)
HBA1C MFR BLD: 7.3 % (ref 4–5.6)
HDLC SERPL-MCNC: 58 MG/DL (ref 40–75)
HDLC SERPL: 33.3 % (ref 20–50)
LDLC SERPL CALC-MCNC: 73.2 MG/DL (ref 63–159)
NONHDLC SERPL-MCNC: 116 MG/DL
TRIGL SERPL-MCNC: 214 MG/DL (ref 30–150)

## 2024-11-15 PROCEDURE — 36415 COLL VENOUS BLD VENIPUNCTURE: CPT | Performed by: NURSE PRACTITIONER

## 2024-11-15 PROCEDURE — 99999 PR PBB SHADOW E&M-EST. PATIENT-LVL IV: CPT | Mod: PBBFAC,,, | Performed by: NURSE PRACTITIONER

## 2024-11-15 PROCEDURE — 83036 HEMOGLOBIN GLYCOSYLATED A1C: CPT | Performed by: NURSE PRACTITIONER

## 2024-11-15 PROCEDURE — 99214 OFFICE O/P EST MOD 30 MIN: CPT | Mod: PBBFAC | Performed by: NURSE PRACTITIONER

## 2024-11-15 PROCEDURE — 80061 LIPID PANEL: CPT | Performed by: NURSE PRACTITIONER

## 2024-11-15 NOTE — PROGRESS NOTES
CC: Patient is here for management of Type 2 diabetes and review of medical conditions as listed in visit diagnosis. She is accompanied by her .      HPI: Ms. Linda Fong is a 74 y.o. female who was diagnosed with Type 2 in 1993, on employment physical exam with labs. She has never been hospitalized r/t DM. She suffered a ICH after a fall years ago and has had decreased memory since then.  Previously tried Novolog AC.    Pt has h/o hypothyroidism, HTN, PN, AMS, TIA like symptoms, ocular HAs,     ER visit 1/2019- AMS, work up for stroke, NSTEMI- both negative, MRI of  Brain, EEG done.       Denies personal history of pancreatitis or pancreatic cancer. Denies family hx of thyroid cancer.     Seen by FARZANEH Tomas DNP, MANAV Hutton NP.   Being seen by me again today. Established care > 4 years w/ me.   Last seen by me in summer 2024.  Met up with YUMIKO Jade a few times this year.  Converted from vgo to cequr  Still price issues.  Currently on basal 36 units at night, 5-6 clicks (10-12 units) ac.     Had fall 10/25/24, injured rib (L) sided, starting PT on Wednesday. Fell down bleachers at game.  Loss balance, had different set of glasses.  Since then has new pair.   Improved.   Has truliicty r/t trulicity  [employer]-prem[employer] rx   [employer] dental, medicare plan g   Stop cgm, alexsander 3 use  Has fanny on phone    In between with moving to [place].  Cost issue with cequr.     Lab Results   Component Value Date    HGBA1C 6.7 (H) 07/03/2024     Exercises: cycle  a1c below goal   Trends over the past 2 years.   Doing well overall     Previous medications:   Trulicity  Novolog  Lantus   Metformin   Humalog  Vgo     CURRENT DIABETIC MEDS:  lantus 36 units at night , cequr 5-6 clicks before meals (10-12 units), self adjustment per scale (insulin) 180-230+2, 231-280+4, 281-330+6, etc,  Metformin 1,000 mg daily, Trulicity 1.5 mg weekly-Sundays    Takes synthroid at [id number] each morning-dispensed levothyroxine- has not started, previously on  brand synthroid   Uses Vials or Pens: Pens  Rarely missing doses of diabetes medications.     On insulin pump, boluses 4x a day  Testing 4 x a day  Patient is willing and able to use the device  Demonstrated an understanding of the technology and is motivated to use CGM  Patient expected to adhere to a comprehensive diabetes treatment plan and patient has adequate medical supervision  Patient experiences multiple impaired awareness of hypoglycemia (hypoglycemia unawareness)    D/t pandemic (covid19) it is imperative for pt to have BG levels monitored closely for optimal health  Not currently consistently exercising, but has membership to [facility]    Diabetes Management Status    Statin: Taking  ACE/ARB: Taking    Screening or Prevention Patient's value Goal Complete/Controlled?   HgA1C Testing and Control   Lab Results   Component Value Date    HGBA1C 6.7 (H) 07/03/2024      Annually/Less than 8% Yes   Lipid profile : 07/03/2024 Annually Yes   LDL control Lab Results   Component Value Date    LDLCALC 59.8 (L) 07/03/2024    Annually/Less than 100 mg/dl  Yes   Nephropathy screening Lab Results   Component Value Date    LABMICR 52.0 08/31/2023     Lab Results   Component Value Date    PROTEINUA 2+ (A) 03/18/2021    Annually Yes   Blood pressure BP Readings from Last 1 Encounters:   07/12/24 114/62    Less than 140/90 Yes   Dilated retinal exam : 01/11/2024 Annually Yes   Foot exam   Most Recent Foot Exam Date: Not Found Annually Yes     REVIEW OF SYSTEMS  General: no weakness; c/o fatigue  Eyes: +vision changes- needs appt, no blurry vision or eye pain.    Cardiac: no chest pain or palpitations.   Respiratory: no cough or dyspnea. +allergies- on claritin  GI: no abdominal pain or nausea.   Skin: no rashes, wounds, or insulin injection site reactions.  Neuro: occ numbness or tingling; no dizziness, +memory loss, contusion to lower back-healing  Endocrine: no polyuria, polydipsia, polyphagia.   Psych: no  "anxiety or depression.   MS: chronic right hip    Vital Signs  /72 (BP Location: Left arm, Patient Position: Sitting)   Pulse 95   Ht 5' 3" (1.6 m)   Wt 80.8 kg (178 lb 2.1 oz)   SpO2 98%   BMI 31.55 kg/m²     Hemoglobin A1C   Date Value Ref Range Status   07/03/2024 6.7 (H) 4.0 - 5.6 % Final     Comment:     ADA Screening Guidelines:  5.7-6.4%  Consistent with prediabetes  >or=6.5%  Consistent with diabetes    High levels of fetal hemoglobin interfere with the HbA1C  assay. Heterozygous hemoglobin variants (HbS, HgC, etc)do  not significantly interfere with this assay.   However, presence of multiple variants may affect accuracy.     06/07/2024 6.8 (H) 4.0 - 5.6 % Final     Comment:     ADA Screening Guidelines:  5.7-6.4%  Consistent with prediabetes  >or=6.5%  Consistent with diabetes    High levels of fetal hemoglobin interfere with the HbA1C  assay. Heterozygous hemoglobin variants (HbS, HgC, etc)do  not significantly interfere with this assay.   However, presence of multiple variants may affect accuracy.     01/10/2024 6.7 (H) 4.0 - 5.6 % Final     Comment:     ADA Screening Guidelines:  5.7-6.4%  Consistent with prediabetes  >or=6.5%  Consistent with diabetes    High levels of fetal hemoglobin interfere with the HbA1C  assay. Heterozygous hemoglobin variants (HbS, HgC, etc)do  not significantly interfere with this assay.   However, presence of multiple variants may affect accuracy.         Chemistry        Component Value Date/Time     07/03/2024 0916    K 4.1 07/03/2024 0916     07/03/2024 0916    CO2 28 07/03/2024 0916    BUN 13 07/03/2024 0916    CREATININE 0.8 07/03/2024 0916     (H) 07/03/2024 0916        Component Value Date/Time    CALCIUM 10.0 07/03/2024 0916    ALKPHOS 80 07/03/2024 0916    AST 17 07/03/2024 0916    ALT 15 07/03/2024 0916    BILITOT 1.5 (H) 07/03/2024 0916        Lab Results   Component Value Date    CHOL 140 07/03/2024    CHOL 150 04/27/2023    CHOL 170 " 01/11/2022     Lab Results   Component Value Date    HDL 57 07/03/2024    HDL 57 04/27/2023    HDL 55 01/11/2022     Lab Results   Component Value Date    LDLCALC 59.8 (L) 07/03/2024    LDLCALC 70.2 04/27/2023    LDLCALC 71.6 01/11/2022     Lab Results   Component Value Date    TRIG 116 07/03/2024    TRIG 114 04/27/2023    TRIG 217 (H) 01/11/2022     Lab Results   Component Value Date    TSH 2.070 01/10/2024     Lab Results   Component Value Date    MICALBCREAT 61.9 (H) 08/31/2023     PHYSICAL EXAMINATION  Constitutional: Appears well, alert, oriented.  Neck: Supple, trachea midline.   Respiratory:  even and unlabored.  Lymph: no edema.   Skin: warm and dry; no insulin site reactions observed.  Neuro: patient alert and cooperative.  Feet: appropriate footwear    Assessment and Plan    1. Type 2 diabetes mellitus with diabetic polyneuropathy, with long-term current use of insulin  Hemoglobin A1C next time  Lab Results   Component Value Date    HGBA1C 6.7 (H) 07/03/2024     Pending A1c today  Urine mac needed before 11/30/24   Continue regimen above    Doing well with cequr  Stable   Has til 12/7/24 for selection of insurance with medicare.       Microalbumin/Creatinine Ratio, Urine      2. Hyperlipidemia associated with type 2 diabetes mellitus  Lab Results   Component Value Date    LDLCALC 59.8 (L) 07/03/2024     At goal   On statin       3. Hypertension associated with diabetes  Microalbumin/Creatinine Ratio, Urine  On arb/acei   Stable       4. Obesity (BMI 30.0-34.9)  Body mass index is 31.55 kg/m².  May increase insulin resistance  On trulicilty       5. Vitamin D deficiency disease  On supplement, ergo  stable

## 2024-12-18 ENCOUNTER — TELEPHONE (OUTPATIENT)
Dept: INTERNAL MEDICINE | Facility: CLINIC | Age: 74
End: 2024-12-18
Payer: MEDICARE

## 2024-12-18 DIAGNOSIS — E11.42 TYPE 2 DIABETES MELLITUS WITH DIABETIC POLYNEUROPATHY, WITH LONG-TERM CURRENT USE OF INSULIN: Chronic | ICD-10-CM

## 2024-12-18 DIAGNOSIS — Z79.4 TYPE 2 DIABETES MELLITUS WITH DIABETIC POLYNEUROPATHY, WITH LONG-TERM CURRENT USE OF INSULIN: Chronic | ICD-10-CM

## 2024-12-18 RX ORDER — INSULIN ASPART 100 [IU]/ML
INJECTION, SOLUTION INTRAVENOUS; SUBCUTANEOUS
Qty: 120 ML | Refills: 3 | Status: SHIPPED | OUTPATIENT
Start: 2024-12-18

## 2024-12-18 RX ORDER — INSULIN ASPART 100 [IU]/ML
INJECTION, SOLUTION INTRAVENOUS; SUBCUTANEOUS
Qty: 120 ML | Refills: 3 | Status: CANCELLED | OUTPATIENT
Start: 2024-12-18

## 2024-12-18 RX ORDER — DULAGLUTIDE 1.5 MG/.5ML
INJECTION, SOLUTION SUBCUTANEOUS
Qty: 12 PEN | Refills: 3 | Status: CANCELLED | OUTPATIENT
Start: 2024-12-18

## 2024-12-18 RX ORDER — DULAGLUTIDE 1.5 MG/.5ML
INJECTION, SOLUTION SUBCUTANEOUS
Qty: 12 PEN | Refills: 3 | Status: SHIPPED | OUTPATIENT
Start: 2024-12-18

## 2024-12-18 NOTE — TELEPHONE ENCOUNTER
----- Message from Lida sent at 12/18/2024  1:11 PM CST -----  Contact: Pt  [phone] or   Pt called in regards to speaking with staff about medication pt did not states which one it was please advise

## 2024-12-18 NOTE — TELEPHONE ENCOUNTER
Spoke to pt. Pt stated she is almost out of vials of Novolog. She recently got a refill of the pens but needs the vials soon. She is also requesting a refill of her [facility]   Pharmacy is [facility]

## 2024-12-19 ENCOUNTER — TELEPHONE (OUTPATIENT)
Dept: INTERNAL MEDICINE | Facility: CLINIC | Age: 74
End: 2024-12-19
Payer: MEDICARE

## 2024-12-19 NOTE — TELEPHONE ENCOUNTER
----- Message from Naomi sent at 12/19/2024  4:14 PM CST -----  Contact: [facility] clinical pharmacy review/Yg [phone]  Is the pt using an external pump to administer insulin? Please call to discuss. Thanks

## 2024-12-20 ENCOUNTER — TELEPHONE (OUTPATIENT)
Dept: INTERNAL MEDICINE | Facility: CLINIC | Age: 74
End: 2024-12-20
Payer: MEDICARE

## 2024-12-20 NOTE — TELEPHONE ENCOUNTER
Called and spoke to [employer]. Started new PA for Cequr patch/pump for next year. Ref #[id number]. Awaiting approval.

## 2024-12-30 ENCOUNTER — TELEPHONE (OUTPATIENT)
Dept: INTERNAL MEDICINE | Facility: CLINIC | Age: 74
End: 2024-12-30
Payer: MEDICARE

## 2024-12-30 DIAGNOSIS — Z79.4 TYPE 2 DIABETES MELLITUS WITH DIABETIC POLYNEUROPATHY, WITH LONG-TERM CURRENT USE OF INSULIN: Chronic | ICD-10-CM

## 2024-12-30 DIAGNOSIS — E11.42 TYPE 2 DIABETES MELLITUS WITH DIABETIC POLYNEUROPATHY, WITH LONG-TERM CURRENT USE OF INSULIN: Chronic | ICD-10-CM

## 2024-12-30 DIAGNOSIS — Z79.4 TYPE 2 DIABETES MELLITUS WITH DIABETIC POLYNEUROPATHY, WITH LONG-TERM CURRENT USE OF INSULIN: Primary | Chronic | ICD-10-CM

## 2024-12-30 DIAGNOSIS — E11.42 TYPE 2 DIABETES MELLITUS WITH DIABETIC POLYNEUROPATHY, WITH LONG-TERM CURRENT USE OF INSULIN: Primary | Chronic | ICD-10-CM

## 2024-12-30 RX ORDER — BLOOD-GLUCOSE SENSOR
EACH MISCELLANEOUS
Qty: 3 EACH | Refills: 3 | OUTPATIENT
Start: 2024-12-30

## 2024-12-30 RX ORDER — BLOOD-GLUCOSE SENSOR
1 EACH MISCELLANEOUS
Qty: 3 EACH | Refills: 3 | Status: SHIPPED | OUTPATIENT
Start: 2024-12-30 | End: 2025-12-30

## 2024-12-30 RX ORDER — BLOOD-GLUCOSE SENSOR
1 EACH MISCELLANEOUS
Qty: 9 EACH | Refills: 2 | Status: SHIPPED | OUTPATIENT
Start: 2024-12-30 | End: 2024-12-30 | Stop reason: SDUPTHER

## 2024-12-30 NOTE — TELEPHONE ENCOUNTER
Pt called and spoke to phone staff. A pt navigator, Joanne Antoine, sent message to staff box.   Unable to route message as a refill request when Rx is not in pt's chart. Rx is a new Rx.  Spoke to pt. Pt is requesting to switch from Jerald to Dexcom. She previously received her Jerald from [facility]. She is requesting Rx for Dexcom to be sent to [facility] because she is out and would like to  Rx asap.

## 2024-12-30 NOTE — TELEPHONE ENCOUNTER
----- Message from Joanne sent at 12/30/2024 10:28 AM CST -----  Contact: [phone] Patient  Requesting an RX refill or new RX.    Is this a refill or new RX: new    RX name and strength (copy/paste from chart):  DEXCOM    Is this a 30 day or 90 day RX:     Pharmacy name and phone # (copy/paste from chart):    [id number] [place] - [address] [place] [address]  [facility]  [place] [address]  Phone: [phone] Fax: [phone]

## 2024-12-30 NOTE — TELEPHONE ENCOUNTER
----- Message from Phoebe sent at 12/30/2024  3:12 PM CST -----  Contact: [phone]  Type: Returning a call    Who left a message?Joanne Mejia MA    When did the practice call?12/30/24    Does patient know what this is regarding:dexcom    Would the patient rather a call back or a response via [facility]? callback    Comments:

## 2024-12-30 NOTE — TELEPHONE ENCOUNTER
Spoke to pt. Pt requesting to switch to Dexcom. She is requesting a [facility] She is out and wants to  asap.   Sent previous message to provider covering for Ms Stevens.

## 2024-12-30 NOTE — TELEPHONE ENCOUNTER
----- Message from Sasha sent at 12/30/2024  2:12 PM CST -----  Contact: [phone]  [email or web address]     Patient is calling for Medical Advice regarding: pt said she needs a call back about her  flash glucose sensor (FREESTYLE SIDNEY 14 DAY SENSOR) Kit was not sent     How long has patient had these symptoms:    Pharmacy name and phone#:    Patient wants a call back or thru [facility]: call back     Comments:    Please advise patient replies from provider may take up to 48 hours.

## 2024-12-30 NOTE — TELEPHONE ENCOUNTER
Spoke to pt's . He stated pt will use Dexcom fanny on her phone.   Pt does not want her Dexcom to be sent to [facility] to be mailed. She is completely out so she want the Rx to be sent to [facility] so she can  today.

## 2024-12-31 NOTE — TELEPHONE ENCOUNTER
----- Message from Nurse Villafuerte sent at 12/31/2024  9:57 AM CST -----  Pt dexcom g7 was denied at pharmacy, this is irrielle pt would you like for me to place an order in parachute ?

## 2025-01-17 ENCOUNTER — OFFICE VISIT (OUTPATIENT)
Dept: INTERNAL MEDICINE | Facility: CLINIC | Age: 75
End: 2025-01-17
Payer: MEDICARE

## 2025-01-17 VITALS
BODY MASS INDEX: 31.33 KG/M2 | SYSTOLIC BLOOD PRESSURE: 122 MMHG | DIASTOLIC BLOOD PRESSURE: 64 MMHG | HEIGHT: 63 IN | WEIGHT: 176.81 LBS | OXYGEN SATURATION: 96 % | HEART RATE: 93 BPM

## 2025-01-17 DIAGNOSIS — E78.5 HYPERLIPIDEMIA ASSOCIATED WITH TYPE 2 DIABETES MELLITUS: ICD-10-CM

## 2025-01-17 DIAGNOSIS — Z00.00 ENCOUNTER FOR ANNUAL PHYSICAL EXAM: Primary | ICD-10-CM

## 2025-01-17 DIAGNOSIS — E03.8 SUBCLINICAL HYPOTHYROIDISM: ICD-10-CM

## 2025-01-17 DIAGNOSIS — E11.42 TYPE 2 DIABETES MELLITUS WITH DIABETIC POLYNEUROPATHY, WITH LONG-TERM CURRENT USE OF INSULIN: ICD-10-CM

## 2025-01-17 DIAGNOSIS — Z79.4 TYPE 2 DIABETES MELLITUS WITH DIABETIC POLYNEUROPATHY, WITH LONG-TERM CURRENT USE OF INSULIN: ICD-10-CM

## 2025-01-17 DIAGNOSIS — M81.0 AGE-RELATED OSTEOPOROSIS WITHOUT CURRENT PATHOLOGICAL FRACTURE: ICD-10-CM

## 2025-01-17 DIAGNOSIS — E11.69 HYPERLIPIDEMIA ASSOCIATED WITH TYPE 2 DIABETES MELLITUS: ICD-10-CM

## 2025-01-17 DIAGNOSIS — E66.811 OBESITY (BMI 30.0-34.9): ICD-10-CM

## 2025-01-17 DIAGNOSIS — Z12.31 BREAST CANCER SCREENING BY MAMMOGRAM: ICD-10-CM

## 2025-01-17 PROCEDURE — 99999 PR PBB SHADOW E&M-EST. PATIENT-LVL V: CPT | Mod: PBBFAC,,, | Performed by: INTERNAL MEDICINE

## 2025-01-17 PROCEDURE — 99397 PER PM REEVAL EST PAT 65+ YR: CPT | Mod: S$PBB,GZ,, | Performed by: INTERNAL MEDICINE

## 2025-01-17 PROCEDURE — 99215 OFFICE O/P EST HI 40 MIN: CPT | Mod: PBBFAC | Performed by: INTERNAL MEDICINE

## 2025-01-17 NOTE — PATIENT INSTRUCTIONS
Schedule bone density scan after 1/31/2025  Schedule mammogram after 2/20/2025  Labwork has been added to your march appointment.   Schedule optometry appointment.     Return to clinic in 6 months or sooner if needed.

## 2025-01-17 NOTE — PROGRESS NOTES
Subjective:       Patient ID: Linda Fong is a 74 y.o. female.    Chief Complaint: Follow-up      HPI  Linda Fong is a 74 y.o. year old female with  t2DM, HTN, HLD, hypothyroidism, vit D deficiency, environmental and seasonal allergies, occular migraines, osteopenia, aortic atherosclerosis presents for annual exam.  diagnosed with pancreatic CA.     Review of Systems   Constitutional:  Negative for activity change, appetite change, fatigue, fever and unexpected weight change.   HENT:  Negative for congestion, hearing loss, postnasal drip, sneezing, sore throat, trouble swallowing and voice change.    Eyes:  Negative for pain and discharge.   Respiratory:  Negative for cough, choking, chest tightness, shortness of breath and wheezing.    Cardiovascular:  Negative for chest pain, palpitations and leg swelling.   Gastrointestinal:  Negative for abdominal distention, abdominal pain, blood in stool, constipation, diarrhea, nausea and vomiting.   Endocrine: Negative for polydipsia and polyuria.   Genitourinary:  Negative for difficulty urinating and flank pain.   Musculoskeletal:  Negative for arthralgias, back pain, joint swelling, myalgias and neck pain.   Skin:  Negative for rash.   Neurological:  Negative for dizziness, tremors, seizures, weakness, numbness and headaches.   Psychiatric/Behavioral:  Negative for agitation. The patient is not nervous/anxious.          Past Medical History:   Diagnosis Date    Altered mental status 1/5/2019    CVA (cerebral vascular accident) 1/5/2019    Diabetic nephropathy     DJD (degenerative joint disease)     Goiter     HTN (hypertension)     Hyperlipidemia     Intracranial bleeding     12/10    Osteopenia     PN (peripheral neuropathy)     Spell of altered cognition 5/19/2014    TIA (transient ischemic attack)     Traumatic brain injury Dec 2010    Type II or unspecified type diabetes mellitus with renal manifestations, uncontrolled(250.42)         Prior to Admission  medications    Medication Sig Start Date End Date Taking? Authorizing Provider   ascorbic acid, vitamin C, (VITAMIN C) 100 MG tablet Take 100 mg by mouth once daily.    Provider, Historical   aspirin (ECOTRIN) 81 MG EC tablet Take 81 mg by mouth once daily.    Provider, Historical   atorvastatin (LIPITOR) 20 MG tablet Take 1 tablet (20 mg total) by mouth once daily. 8/20/24   J Luis Stevens APRN, FNP   biotin 1 mg Cap Take 1 capsule by mouth once daily.    Provider, Historical   blood sugar diagnostic Strp Accuchek guide meter. To check BG 3  times daily, to use with insurance preferred meter, e 11.65 8/24/22   J Luis Stevens APRN, FNP   blood-glucose meter kit Accuchek guide meter. To check BG 3  times daily, to use with insurance preferred meter, e 11.65 8/24/22 6/10/24  J Luis Stevens APRN, FNP   blood-glucose sensor (DEXCOM G7 SENSOR) Mariel 1 Device by Misc.(Non-Drug; Combo Route) route every 10 days. 12/30/24 12/30/25  Becca Nuñez NP   bolus insulin pump, 200 unit (CEQUR SIMPLICITY) 2 unit Mariel Use as directed, 1-7 clicks before meals, snacks up to 4x a day. 6/10/24   J Luis Stevens APRN, JEANNETTE   diabetic supplies, miscellan. (CEQUR SIMPLICITY ) Misc Use as directed. 6/10/24   J Luis Stevens APRN, JEANNETTE   dulaglutide (TRULICITY) 1.5 mg/0.5 mL pen injector INJECT 1.5MG (1 PEN) UNDER THE SKIN EVERY 7 DAYS 12/18/24   J Luis Stevens, LOVE, FNP   ergocalciferol (ERGOCALCIFEROL) 50,000 unit Cap TAKE 1 CAPSULE EVERY 30    DAYS 6/6/23   Rob Phillips MD   insulin aspart U-100 (NOVOLOG U-100 INSULIN ASPART) 100 unit/mL injection Uses vgo 40, max daily 130 units, 12 vials per 90 days, 6 clicks with meals, correction scale 180-230+2, 231-280+4, 281-330+6, 331-380+8, >380+10. 12/18/24   J Luis Stevens, LOVE, MILAP   insulin glargine U-100, Lantus, (LANTUS SOLOSTAR U-100 INSULIN) 100 unit/mL (3 mL) InPn pen Inject 36 units at night 8/29/24   J Luis Stevens APRN, JEANNETTE   lancets [facility] To check  "BG 3  times daily, to use with insurance preferred meter, e 11.65 8/10/21   J Luis Stevens APRN, FNP   levothyroxine (SYNTHROID) 25 MCG tablet Take 1 tablet in the morning on an empty stomach, water only, wait 30-45 minutes before eating, drinking or taking other medications. 2/27/24   J Luis Stevens APRN, FNP   losartan-hydrochlorothiazide 50-12.5 mg (HYZAAR) 50-12.5 mg per tablet Take 1 tablet by mouth once daily. 8/20/24   J Luis Stevens APRN, FNP   metFORMIN (GLUCOPHAGE) 1000 MG tablet Take 1 tablet daily 5/2/23   J Luis Stevens APRN, FNP   multivitamin capsule Take 1 capsule by mouth once daily.    Provider, Historical   NOVOLOG FLEXPEN U-100 INSULIN 100 unit/mL (3 mL) InPn pen Inject 6-10 units before meals plus scale 180-230+2, 231-280+4, 281-330+6, 331-380+8, >380+10. Max daily 60 units. 8/29/24   J Luis Stevens APRN, FNP   omega-3s/dha/epa/fish oil/D3 (VITAMIN-D + OMEGA-3 ORAL) Take by mouth.    Provider, Historical   pen needle, diabetic (BD GABI 2ND GEN PEN NEEDLE) 32 gauge x 5/32" Ndle Use 1x a day with lantus 6/10/24   J Luis Stevens APRN, FNP   sub-q insulin device, 30 unit (V-GO 30) Mariel Use daily.  Patient not taking: Reported on 11/15/2024 1/25/24   J Luis Stevens APRN, FNP   sub-q insulin device, 30 unit (V-GO 30) Mariel Change daily  Patient not taking: Reported on 11/15/2024 1/25/24   J Luis Stevens APRN, FNP   zinc gluconate 50 mg tablet Take 50 mg by mouth once daily.    Provider, Historical        Past medical history, surgical history, and family medical history reviewed and updated as appropriate.    Medications and allergies reviewed.     Objective:          Vitals:    01/17/25 0953   BP: 122/64   BP Location: Right arm   Patient Position: Sitting   Pulse: 93   SpO2: 96%   Weight: 80.2 kg (176 lb 12.9 oz)   Height: 5' 3" (1.6 m)     Body mass index is 31.32 kg/m².  Physical Exam  Constitutional:       Appearance: She is well-developed.   HENT:      Head: " Normocephalic and atraumatic.   Eyes:      Pupils: Pupils are equal, round, and reactive to light.   Cardiovascular:      Rate and Rhythm: Normal rate and regular rhythm.      Heart sounds: Normal heart sounds.   Pulmonary:      Effort: Pulmonary effort is normal. No respiratory distress.      Breath sounds: Normal breath sounds. No wheezing.   Abdominal:      General: Bowel sounds are normal. There is no distension.      Palpations: Abdomen is soft.      Tenderness: There is no abdominal tenderness.   Musculoskeletal:         General: No tenderness. Normal range of motion.      Cervical back: Normal range of motion.   Skin:     General: Skin is warm and dry.   Neurological:      Mental Status: She is alert and oriented to person, place, and time.      Cranial Nerves: No cranial nerve deficit.      Deep Tendon Reflexes: Reflexes are normal and symmetric.         Lab Results   Component Value Date    WBC 8.24 07/03/2024    HGB 12.9 07/03/2024    HCT 39.2 07/03/2024     07/03/2024    CHOL 174 11/15/2024    TRIG 214 (H) 11/15/2024    HDL 58 11/15/2024    ALT 15 07/03/2024    AST 17 07/03/2024     07/03/2024    K 4.1 07/03/2024     07/03/2024    CREATININE 0.8 07/03/2024    BUN 13 07/03/2024    CO2 28 07/03/2024    TSH 2.070 01/10/2024    INR 1.0 01/05/2019    HGBA1C 7.3 (H) 11/15/2024       Assessment:       1. Encounter for annual physical exam    2. Type 2 diabetes mellitus with diabetic polyneuropathy, with long-term current use of insulin    3. Breast cancer screening by mammogram    4. Age-related osteoporosis without current pathological fracture    5. Hyperlipidemia associated with type 2 diabetes mellitus    6. Subclinical hypothyroidism    7. Obesity (BMI 30.0-34.9)          Plan:     Linda was seen today for follow-up.    Diagnoses and all orders for this visit:    Encounter for annual physical exam    Type 2 diabetes mellitus with diabetic polyneuropathy, with long-term current use of  insulin  -     Ambulatory referral/consult to Optometry; Future  -     BASIC METABOLIC PANEL; Future    Breast cancer screening by mammogram  -     Mammo Digital Screening Bilat; Future    Age-related osteoporosis without current pathological fracture  -     DXA Bone Density Axial Skeleton 1 or more sites; Future    Hyperlipidemia associated with type 2 diabetes mellitus    Subclinical hypothyroidism    Obesity (BMI 30.0-34.9)      Schedule bone density scan after 1/31/2025  Schedule mammogram after 2/20/2025  Labwork has been added to your march appointment.   Schedule optometry appointment.     Return to clinic in 6 months or sooner if needed.     Health maintenance reviewed with patient.     Follow up in about 6 months (around 7/17/2025).    Rob Phillips MD  Internal Medicine / Primary Care  [facility]  1/20/2025

## 2025-01-29 RX ORDER — LEVOTHYROXINE SODIUM 25 UG/1
TABLET ORAL
Qty: 90 TABLET | Refills: 3 | Status: SHIPPED | OUTPATIENT
Start: 2025-01-29

## 2025-02-14 ENCOUNTER — TELEPHONE (OUTPATIENT)
Dept: INTERNAL MEDICINE | Facility: CLINIC | Age: 75
End: 2025-02-14
Payer: MEDICARE

## 2025-02-14 NOTE — TELEPHONE ENCOUNTER
Spoke to pt. Pt stated she would like Ms Stevens to get her a CGM through whichever company will be the least out-of-pocket expense

## 2025-03-28 ENCOUNTER — PATIENT MESSAGE (OUTPATIENT)
Dept: INTERNAL MEDICINE | Facility: CLINIC | Age: 75
End: 2025-03-28
Payer: MEDICARE

## 2025-03-29 NOTE — PROGRESS NOTES
CC: Patient is here for management of Type 2 diabetes and review of medical conditions as listed in visit diagnosis.   She is accompanied by her .      HPI: Ms. Linda Fong is a 75 y.o. female who was diagnosed with Type 2 in 1993, on employment physical exam with labs. She has never been hospitalized r/t DM. She suffered a ICH after a fall years ago and has had decreased memory since then.  Previously tried Novolog AC.  Back on mdi now.  Pt has hypothyroidism, HTN, PN, AMS, TIA like symptoms, ocular HAs    ER visit 1/2019- AMS, work up for stroke, NSTEMI- both negative, MRI of  Brain, EEG done.       Denies personal history of pancreatitis or pancreatic cancer. Denies family hx of thyroid cancer.     Seen by FARZANEH Tomas DNP, MANAV Hutton NP.   Being seen by me again today. Established care > 4.5 years w/ me.   Last seen by me in fall 2024.  Being seen by me again today.     Met up with YUMIKO Jade a few times this year.  Converted from vgo to mdi. Tried cequr-price issue    Getting back on alexsander.  ADS supplier.   Price issues with vgo, cequr.    Taking novolog 12 units before meals, lantus 36 units at night.     Had fall 10/25/24, injured rib (L) sided, starting PT on Wednesday. Fell down bleachers at game.  Loss balance, had different set of glasses.  Since then has new pair.   Improved.   [employer]-[facility] rx   [employer] dental, medicare plan g   Has fanny on phone  Starting back this week.     In between with moving to [place].    Stressors:  w/ pancreatitic cancer.   A1c went up from last visit.   Lab Results   Component Value Date    HGBA1C 7.5 (H) 03/27/2025     Exercises: cycle  a1c below goal   Trends over the past 2 years.   Doing well overall     Previous medications:   Trulicity  Novolog  Lantus   Metformin   Humalog  Vgo   Cequr    CURRENT DIABETIC MEDS:  lantus 36 units at night , novolog 12-12-12 units ac, self adjustment per scale (insulin) 180-230+2, 231-280+4, 281-330+6, etc,  Metformin 1,000 mg daily,  Trulicity 1.5 mg weekly-Sundays    Takes synthroid at [id number] each morning-dispensed levothyroxine- has not started, previously on brand synthroid   Uses Vials or Pens: Pens  Rarely missing doses of diabetes medications.     On insulin pump, boluses 4x a day  Testing 4 x a day  Patient is willing and able to use the device  Demonstrated an understanding of the technology and is motivated to use CGM  Patient expected to adhere to a comprehensive diabetes treatment plan and patient has adequate medical supervision  Patient experiences multiple impaired awareness of hypoglycemia (hypoglycemia unawareness)    D/t pandemic (covid19) it is imperative for pt to have BG levels monitored closely for optimal health  Not currently consistently exercising, but has membership to [facility]    Diabetes Management Status    Statin: Taking  ACE/ARB: Taking    Screening or Prevention Patient's value Goal Complete/Controlled?   HgA1C Testing and Control   Lab Results   Component Value Date    HGBA1C 7.5 (H) 03/27/2025      Annually/Less than 8% Yes   Lipid profile : 11/15/2024 Annually Yes   LDL control Lab Results   Component Value Date    LDLCALC 73.2 11/15/2024    Annually/Less than 100 mg/dl  Yes   Nephropathy screening Lab Results   Component Value Date    LABMICR 69.0 03/27/2025     Lab Results   Component Value Date    PROTEINUA 2+ (A) 03/18/2021    Annually Yes   Blood pressure BP Readings from Last 1 Encounters:   01/17/25 122/64    Less than 140/90 Yes   Dilated retinal exam : 01/11/2024 Annually Yes   Foot exam   Most Recent Foot Exam Date: Not Found Annually Yes     REVIEW OF SYSTEMS  General: no weakness; c/o fatigue  Eyes: +vision changes- needs appt, no blurry vision or eye pain.    Cardiac: no chest pain or palpitations.   Respiratory: no cough or dyspnea. +allergies- on claritin  GI: no abdominal pain or nausea.   Skin: no rashes, wounds, or insulin injection site reactions.  Neuro: occ numbness or  "tingling; no dizziness, +memory loss, contusion to lower back-healing  Endocrine: no polyuria, polydipsia, polyphagia.   Psych: no anxiety or depression.   MS: chronic right hip    Vital Signs  /74 (BP Location: Left arm, Patient Position: Sitting)   Pulse 94   Ht 5' 3" (1.6 m)   Wt 80.2 kg (176 lb 12.9 oz)   SpO2 95%   BMI 31.32 kg/m²     Hemoglobin A1C   Date Value Ref Range Status   11/15/2024 7.3 (H) 4.0 - 5.6 % Final     Comment:     ADA Screening Guidelines:  5.7-6.4%  Consistent with prediabetes  >or=6.5%  Consistent with diabetes    High levels of fetal hemoglobin interfere with the HbA1C  assay. Heterozygous hemoglobin variants (HbS, HgC, etc)do  not significantly interfere with this assay.   However, presence of multiple variants may affect accuracy.     07/03/2024 6.7 (H) 4.0 - 5.6 % Final     Comment:     ADA Screening Guidelines:  5.7-6.4%  Consistent with prediabetes  >or=6.5%  Consistent with diabetes    High levels of fetal hemoglobin interfere with the HbA1C  assay. Heterozygous hemoglobin variants (HbS, HgC, etc)do  not significantly interfere with this assay.   However, presence of multiple variants may affect accuracy.     06/07/2024 6.8 (H) 4.0 - 5.6 % Final     Comment:     ADA Screening Guidelines:  5.7-6.4%  Consistent with prediabetes  >or=6.5%  Consistent with diabetes    High levels of fetal hemoglobin interfere with the HbA1C  assay. Heterozygous hemoglobin variants (HbS, HgC, etc)do  not significantly interfere with this assay.   However, presence of multiple variants may affect accuracy.       Hemoglobin A1c   Date Value Ref Range Status   03/27/2025 7.5 (H) 4.0 - 5.6 % Final     Comment:     ADA Screening Guidelines:  5.7-6.4%  Consistent with prediabetes  >=6.5%  Consistent with diabetes    High levels of fetal hemoglobin interfere with the HbA1C  assay. Heterozygous hemoglobin variants (HbS, HgC, etc)do  not significantly interfere with this assay.   However, presence of " multiple variants may affect accuracy.       Chemistry        Component Value Date/Time     03/27/2025 0835     07/03/2024 0916    K 4.0 03/27/2025 0835    K 4.1 07/03/2024 0916     03/27/2025 0835     07/03/2024 0916    CO2 24 03/27/2025 0835    CO2 28 07/03/2024 0916    BUN 22 03/27/2025 0835    CREATININE 0.8 03/27/2025 0835     (H) 07/03/2024 0916        Component Value Date/Time    CALCIUM 9.6 03/27/2025 0835    CALCIUM 10.0 07/03/2024 0916    ALKPHOS 80 07/03/2024 0916    AST 17 07/03/2024 0916    ALT 15 07/03/2024 0916    BILITOT 1.5 (H) 07/03/2024 0916        Lab Results   Component Value Date    CHOL 174 11/15/2024    CHOL 140 07/03/2024    CHOL 150 04/27/2023     Lab Results   Component Value Date    HDL 58 11/15/2024    HDL 57 07/03/2024    HDL 57 04/27/2023     Lab Results   Component Value Date    LDLCALC 73.2 11/15/2024    LDLCALC 59.8 (L) 07/03/2024    LDLCALC 70.2 04/27/2023     Lab Results   Component Value Date    TRIG 214 (H) 11/15/2024    TRIG 116 07/03/2024    TRIG 114 04/27/2023     Lab Results   Component Value Date    TSH 2.070 01/10/2024     Lab Results   Component Value Date    MICALBCREAT 160.5 (H) 03/27/2025     PHYSICAL EXAMINATION  Constitutional: Appears well, alert, oriented.  Neck: Supple, trachea midline.   Respiratory:  even and unlabored.  Lymph: no edema.   Skin: warm and dry; no insulin site reactions observed.  Neuro: patient alert and cooperative.  Feet: appropriate footwear    Assessment and Plan  1. Type 2 diabetes mellitus with diabetic polyneuropathy, with long-term current use of insulin  Hemoglobin A1C next time   F/u in 4 months   A1c goal less than 7.5%  F/u with podiatry   Continue regimen   Starting back alexsander this week       2. Obesity (BMI 30.0-34.9)  Body mass index is 31.32 kg/m².  May increase insulin resistance   On trulicity   Encourage exercise 3-5 x a week        3. Hypothyroidism, acquired  Lab Results   Component Value Date     TSH 2.070 01/10/2024     At goal   On l-t4         4. Hypertension associated with diabetes  Microalbumin/Creatinine Ratio, Urine  On arb/acei  Stable       5. Hyperlipidemia associated with type 2 diabetes mellitus  Lab Results   Component Value Date    LDLCALC 73.2 11/15/2024     At goal   On statin         6. Aortic atherosclerosis  F/u with cards  Stable

## 2025-03-31 ENCOUNTER — OFFICE VISIT (OUTPATIENT)
Dept: INTERNAL MEDICINE | Facility: CLINIC | Age: 75
End: 2025-03-31
Payer: MEDICARE

## 2025-03-31 ENCOUNTER — HOSPITAL ENCOUNTER (OUTPATIENT)
Dept: RADIOLOGY | Facility: CLINIC | Age: 75
Discharge: HOME | End: 2025-03-31
Attending: INTERNAL MEDICINE
Payer: MEDICARE

## 2025-03-31 ENCOUNTER — HOSPITAL ENCOUNTER (OUTPATIENT)
Dept: RADIOLOGY | Facility: HOSPITAL | Age: 75
Discharge: HOME | End: 2025-03-31
Attending: INTERNAL MEDICINE
Payer: MEDICARE

## 2025-03-31 VITALS
DIASTOLIC BLOOD PRESSURE: 74 MMHG | HEIGHT: 63 IN | OXYGEN SATURATION: 95 % | WEIGHT: 176.81 LBS | SYSTOLIC BLOOD PRESSURE: 136 MMHG | HEART RATE: 94 BPM | BODY MASS INDEX: 31.33 KG/M2

## 2025-03-31 DIAGNOSIS — E11.69 HYPERLIPIDEMIA ASSOCIATED WITH TYPE 2 DIABETES MELLITUS: Chronic | ICD-10-CM

## 2025-03-31 DIAGNOSIS — Z79.4 TYPE 2 DIABETES MELLITUS WITH DIABETIC POLYNEUROPATHY, WITH LONG-TERM CURRENT USE OF INSULIN: Primary | Chronic | ICD-10-CM

## 2025-03-31 DIAGNOSIS — Z12.31 BREAST CANCER SCREENING BY MAMMOGRAM: ICD-10-CM

## 2025-03-31 DIAGNOSIS — E66.811 OBESITY (BMI 30.0-34.9): ICD-10-CM

## 2025-03-31 DIAGNOSIS — E11.59 HYPERTENSION ASSOCIATED WITH DIABETES: Chronic | ICD-10-CM

## 2025-03-31 DIAGNOSIS — E03.9 HYPOTHYROIDISM, ACQUIRED: Chronic | ICD-10-CM

## 2025-03-31 DIAGNOSIS — M81.0 AGE-RELATED OSTEOPOROSIS WITHOUT CURRENT PATHOLOGICAL FRACTURE: ICD-10-CM

## 2025-03-31 DIAGNOSIS — E11.42 TYPE 2 DIABETES MELLITUS WITH DIABETIC POLYNEUROPATHY, WITH LONG-TERM CURRENT USE OF INSULIN: Primary | Chronic | ICD-10-CM

## 2025-03-31 DIAGNOSIS — E78.5 HYPERLIPIDEMIA ASSOCIATED WITH TYPE 2 DIABETES MELLITUS: Chronic | ICD-10-CM

## 2025-03-31 DIAGNOSIS — I15.2 HYPERTENSION ASSOCIATED WITH DIABETES: Chronic | ICD-10-CM

## 2025-03-31 DIAGNOSIS — I70.0 AORTIC ATHEROSCLEROSIS: ICD-10-CM

## 2025-03-31 PROCEDURE — 77063 BREAST TOMOSYNTHESIS BI: CPT | Mod: 26,,, | Performed by: RADIOLOGY

## 2025-03-31 PROCEDURE — 99215 OFFICE O/P EST HI 40 MIN: CPT | Mod: PBBFAC,25 | Performed by: NURSE PRACTITIONER

## 2025-03-31 PROCEDURE — 77080 DXA BONE DENSITY AXIAL: CPT | Mod: 26,,, | Performed by: INTERNAL MEDICINE

## 2025-03-31 PROCEDURE — 77080 DXA BONE DENSITY AXIAL: CPT | Mod: TC

## 2025-03-31 PROCEDURE — 99999 PR PBB SHADOW E&M-EST. PATIENT-LVL V: CPT | Mod: PBBFAC,,, | Performed by: NURSE PRACTITIONER

## 2025-03-31 PROCEDURE — 99214 OFFICE O/P EST MOD 30 MIN: CPT | Mod: S$PBB,,, | Performed by: NURSE PRACTITIONER

## 2025-03-31 PROCEDURE — 77067 SCR MAMMO BI INCL CAD: CPT | Mod: 26,,, | Performed by: RADIOLOGY

## 2025-03-31 PROCEDURE — 77067 SCR MAMMO BI INCL CAD: CPT | Mod: TC

## 2025-03-31 RX ORDER — METFORMIN HYDROCHLORIDE 1000 MG/1
TABLET ORAL
Start: 2025-03-31

## 2025-03-31 RX ORDER — INSULIN ASPART 100 [IU]/ML
INJECTION, SOLUTION INTRAVENOUS; SUBCUTANEOUS
Start: 2025-03-31

## 2025-03-31 NOTE — PATIENT INSTRUCTIONS
Follow up in 4 months w/Irielle   A1c urine mac in 4 months     Lab Results   Component Value Date    HGBA1C 7.5 (H) 03/27/2025     Goal less than 7.5%     [email or web address]  Eat fit fanny  Netsizepal fanny  [email or web address]   mySugr fanny    Goal  no higher than 180     Lantus 36 units at night   Novolog 12-12-12 units before meals  Scale 180-230+2, etc.  Trulicity 1.5 mg weekly   Metformin 1000 mg in am

## 2025-04-01 ENCOUNTER — RESULTS FOLLOW-UP (OUTPATIENT)
Dept: INTERNAL MEDICINE | Facility: CLINIC | Age: 75
End: 2025-04-01

## 2025-04-29 ENCOUNTER — TELEPHONE (OUTPATIENT)
Dept: INTERNAL MEDICINE | Facility: CLINIC | Age: 75
End: 2025-04-29
Payer: MEDICARE

## 2025-04-30 ENCOUNTER — TELEPHONE (OUTPATIENT)
Dept: INTERNAL MEDICINE | Facility: CLINIC | Age: 75
End: 2025-04-30
Payer: MEDICARE

## 2025-04-30 ENCOUNTER — OFFICE VISIT (OUTPATIENT)
Dept: INTERNAL MEDICINE | Facility: CLINIC | Age: 75
End: 2025-04-30
Payer: MEDICARE

## 2025-04-30 VITALS
SYSTOLIC BLOOD PRESSURE: 124 MMHG | HEIGHT: 63 IN | OXYGEN SATURATION: 97 % | HEART RATE: 84 BPM | TEMPERATURE: 99 F | WEIGHT: 175.5 LBS | DIASTOLIC BLOOD PRESSURE: 72 MMHG | BODY MASS INDEX: 31.1 KG/M2

## 2025-04-30 DIAGNOSIS — J32.9 RHINOSINUSITIS: Primary | ICD-10-CM

## 2025-04-30 LAB
CTP QC/QA: YES
CTP QC/QA: YES
POC MOLECULAR INFLUENZA A AGN: NEGATIVE
POC MOLECULAR INFLUENZA B AGN: NEGATIVE
SARS-COV-2 RDRP RESP QL NAA+PROBE: NEGATIVE

## 2025-04-30 PROCEDURE — 87635 SARS-COV-2 COVID-19 AMP PRB: CPT | Mod: PBBFAC | Performed by: NURSE PRACTITIONER

## 2025-04-30 PROCEDURE — 99213 OFFICE O/P EST LOW 20 MIN: CPT | Mod: S$PBB,,, | Performed by: NURSE PRACTITIONER

## 2025-04-30 PROCEDURE — 99999PBSHW POCT INFLUENZA A/B MOLECULAR: Mod: PBBFAC,,,

## 2025-04-30 PROCEDURE — 99999PBSHW: Mod: PBBFAC,,,

## 2025-04-30 PROCEDURE — 99999 PR PBB SHADOW E&M-EST. PATIENT-LVL IV: CPT | Mod: PBBFAC,,, | Performed by: NURSE PRACTITIONER

## 2025-04-30 PROCEDURE — 99214 OFFICE O/P EST MOD 30 MIN: CPT | Mod: PBBFAC | Performed by: NURSE PRACTITIONER

## 2025-04-30 PROCEDURE — 87502 INFLUENZA DNA AMP PROBE: CPT | Mod: PBBFAC | Performed by: NURSE PRACTITIONER

## 2025-04-30 RX ORDER — BENZONATATE 100 MG/1
100 CAPSULE ORAL 3 TIMES DAILY PRN
Qty: 30 CAPSULE | Refills: 0 | Status: SHIPPED | OUTPATIENT
Start: 2025-04-30 | End: 2025-05-10

## 2025-04-30 RX ORDER — AZELASTINE 1 MG/ML
1 SPRAY, METERED NASAL 2 TIMES DAILY
Qty: 30 ML | Refills: 0 | Status: SHIPPED | OUTPATIENT
Start: 2025-04-30 | End: 2026-04-30

## 2025-04-30 RX ORDER — FLUTICASONE PROPIONATE 50 MCG
1 SPRAY, SUSPENSION (ML) NASAL DAILY
Qty: 9.9 ML | Refills: 0 | Status: SHIPPED | OUTPATIENT
Start: 2025-04-30

## 2025-04-30 NOTE — TELEPHONE ENCOUNTER
----- Message from Lacy sent at 4/30/2025  9:05 AM CDT -----  .1MEDICALADVICE Patient is calling for Medical Advice regarding: pt would like a call back in reference to an appt that was scheduled for her that she will not be able to make today. How long has patient had these symptoms:Pharmacy name and phone#:Patient wants a call back or thru [facility]:Comments:Please advise patient replies from provider may take up to 48 hours.

## 2025-04-30 NOTE — PROGRESS NOTES
INTERNAL MEDICINE PROGRESS/URGENT CARE NOTE    CHIEF COMPLAINT     Chief Complaint   Patient presents with    Cough       HPI     Linda Fong is a 75 y.o. female who presents for an urgent visit today.    Pt started with post nasal drip and cough x months.   Has been on zyrtec near daily since then.   Cough is nonproductive.    on chemo recently admitted to hospital with possible PNA this past Sunday. Wants to make sure she didn't give anything to him.   Her symptoms are no worse than normal.   No fevers, chills, body aches, sob.       Problem List[1]     Past Medical History:  Past Medical History:   Diagnosis Date    Altered mental status 1/5/2019    CVA (cerebral vascular accident) 1/5/2019    Diabetic nephropathy     DJD (degenerative joint disease)     Goiter     HTN (hypertension)     Hyperlipidemia     Intracranial bleeding     12/10    Osteopenia     PN (peripheral neuropathy)     Spell of altered cognition 5/19/2014    TIA (transient ischemic attack)     Traumatic brain injury Dec 2010    Type II or unspecified type diabetes mellitus with renal manifestations, uncontrolled(250.42)         Past Surgical History:  Past Surgical History:   Procedure Laterality Date    COLONOSCOPY N/A 2/19/2019    Procedure: COLONOSCOPY;  Surgeon: Chinedu Hopkins MD;  Location: [facility] ([address] [place] [address]);  Service: Endoscopy;  Laterality: N/A;    COLONOSCOPY N/A 3/13/2024    Procedure: COLONOSCOPY;  Surgeon: Pérez Crum MD;  Location: [facility] ([address] [place] [address]);  Service: Endoscopy;  Laterality: N/A;  Ref by Dr ELADIO Phillips, Pt has V-Go Insulin Device, [facility]- Trulicity-hold x 7 days prior procedure, PEG, portal - PC  3/7/24- precall complete / Pt confirmed Trulicity hold , took last dose on 3/4/24-ERW    TONSILLECTOMY          Allergies:  Review of patient's allergies indicates:   Allergen Reactions    Penicillins Other (See Comments)     Sore throat as a infant       Home Medications:  Current Medications[2]     Review of  "Systems:  Review of Systems   Constitutional:  Negative for fever.   HENT:  Positive for postnasal drip. Negative for congestion and sore throat.    Respiratory:  Positive for cough. Negative for shortness of breath and wheezing.    Cardiovascular:  Negative for chest pain.   Neurological:  Negative for weakness and headaches.         PHYSICAL EXAM     Vitals:    04/30/25 1322   BP: 124/72   BP Location: Left arm   Patient Position: Sitting   Pulse: 84   Temp: 98.5 °F (36.9 °C)   TempSrc: Oral   SpO2: 97%   Weight: 79.6 kg (175 lb 7.8 oz)   Height: 5' 3" (1.6 m)      Body mass index is 31.09 kg/m².     Physical Exam  Vitals reviewed.   Constitutional:       Appearance: Normal appearance.   HENT:      Head: Normocephalic.      Mouth/Throat:      Mouth: Mucous membranes are moist.      Pharynx: Oropharynx is clear. No posterior oropharyngeal erythema.   Eyes:      Conjunctiva/sclera: Conjunctivae normal.      Pupils: Pupils are equal, round, and reactive to light.   Cardiovascular:      Rate and Rhythm: Normal rate and regular rhythm.   Pulmonary:      Effort: Pulmonary effort is normal.      Breath sounds: Normal breath sounds. No wheezing, rhonchi or rales.   Lymphadenopathy:      Cervical: No cervical adenopathy.   Skin:     General: Skin is warm and dry.   Neurological:      General: No focal deficit present.      Mental Status: She is alert.   Psychiatric:         Mood and Affect: Mood normal.         Behavior: Behavior normal.         LABS     Lab Results   Component Value Date    HGBA1C 7.5 (H) 03/27/2025     CMP  Sodium   Date Value Ref Range Status   03/27/2025 139 136 - 145 mmol/L Final   07/03/2024 140 136 - 145 mmol/L Final     Potassium   Date Value Ref Range Status   03/27/2025 4.0 3.5 - 5.1 mmol/L Final   07/03/2024 4.1 3.5 - 5.1 mmol/L Final     Chloride   Date Value Ref Range Status   03/27/2025 104 95 - 110 mmol/L Final   07/03/2024 102 95 - 110 mmol/L Final     CO2   Date Value Ref Range Status "   03/27/2025 24 23 - 29 mmol/L Final   07/03/2024 28 23 - 29 mmol/L Final     Glucose   Date Value Ref Range Status   03/27/2025 269 (H) 70 - 110 mg/dL Final   07/03/2024 188 (H) 70 - 110 mg/dL Final     BUN   Date Value Ref Range Status   03/27/2025 22 8 - 23 mg/dL Final     Creatinine   Date Value Ref Range Status   03/27/2025 0.8 0.5 - 1.4 mg/dL Final     Calcium   Date Value Ref Range Status   03/27/2025 9.6 8.7 - 10.5 mg/dL Final   07/03/2024 10.0 8.7 - 10.5 mg/dL Final     Total Protein   Date Value Ref Range Status   07/03/2024 7.1 6.0 - 8.4 g/dL Final     Albumin   Date Value Ref Range Status   07/03/2024 3.6 3.5 - 5.2 g/dL Final     Total Bilirubin   Date Value Ref Range Status   07/03/2024 1.5 (H) 0.1 - 1.0 mg/dL Final     Comment:     For infants and newborns, interpretation of results should be based  on gestational age, weight and in agreement with clinical  observations.    Premature Infant recommended reference ranges:  Up to 24 hours.............<8.0 mg/dL  Up to 48 hours............<12.0 mg/dL  3-5 days..................<15.0 mg/dL  6-29 days.................<15.0 mg/dL       Alkaline Phosphatase   Date Value Ref Range Status   07/03/2024 80 55 - 135 U/L Final     AST   Date Value Ref Range Status   07/03/2024 17 10 - 40 U/L Final     ALT   Date Value Ref Range Status   07/03/2024 15 10 - 44 U/L Final     Anion Gap   Date Value Ref Range Status   03/27/2025 11 8 - 16 mmol/L Final     eGFR if    Date Value Ref Range Status   05/04/2022 >60.0 >60 mL/min/1.73 m^2 Final     eGFR if non    Date Value Ref Range Status   05/04/2022 >60.0 >60 mL/min/1.73 m^2 Final     Comment:     Calculation used to obtain the estimated glomerular filtration  rate (eGFR) is the CKD-EPI equation.        Lab Results   Component Value Date    WBC 8.24 07/03/2024    HGB 12.9 07/03/2024    HCT 39.2 07/03/2024    MCV 87 07/03/2024     07/03/2024     Lab Results   Component Value Date     CHOL 174 11/15/2024    CHOL 140 07/03/2024    CHOL 150 04/27/2023     Lab Results   Component Value Date    HDL 58 11/15/2024    HDL 57 07/03/2024    HDL 57 04/27/2023     Lab Results   Component Value Date    LDLCALC 73.2 11/15/2024    LDLCALC 59.8 (L) 07/03/2024    LDLCALC 70.2 04/27/2023     Lab Results   Component Value Date    TRIG 214 (H) 11/15/2024    TRIG 116 07/03/2024    TRIG 114 04/27/2023     Lab Results   Component Value Date    CHOLHDL 33.3 11/15/2024    CHOLHDL 40.7 07/03/2024    CHOLHDL 38.0 04/27/2023     Lab Results   Component Value Date    TSH 2.070 01/10/2024       ASSESSMENT     1. Rhinosinusitis         PLAN  She was concerned for flu and covid which were negative.   Likely just her allergies. These are chronic and no worse than normal. Reassurance given that she likely did not cause the pna.   Supportive meds sent. Recommend she switch from zyrtec to another antihistamine like allegra or claritin since she's been on zyrtec for a while.   She will f/u prn    1. Rhinosinusitis  - benzonatate (TESSALON) 100 MG capsule; Take 1 capsule (100 mg total) by mouth 3 (three) times daily as needed.  Dispense: 30 capsule; Refill: 0  - fluticasone propionate (FLONASE) 50 mcg/actuation nasal spray; 1 spray (50 mcg total) by Each Nostril route once daily.  Dispense: 9.9 mL; Refill: 0  - azelastine (ASTELIN) 137 mcg (0.1 %) nasal spray; 1 spray (137 mcg total) by Nasal route 2 (two) times daily.  Dispense: 30 mL; Refill: 0  - POCT Influenza A/B Molecular  - POCT COVID-19 Rapid Screening       Follow up with PCP     Patient was counseled on when to seek emergent care. Patient's plan/treatment was discussed including medications and possible side effects. Verbalized understanding of all instructions.     This note was partly generated with [employer] voice recognition software. I apologize for any possible typographical errors.          NELLY Merrill  Department of Internal Medicine - [facility]  [address]  04/30/2025          [1]   Patient Active Problem List  Diagnosis    Type 2 diabetes mellitus with diabetic polyneuropathy, with long-term current use of insulin    Hypertension associated with diabetes    Hyperlipidemia associated with type 2 diabetes mellitus    Osteopenia    Vitamin D deficiency disease    Multinodular goiter    Memory deficit    Diabetic peripheral neuropathy associated with type 2 diabetes mellitus    Hypothyroidism, acquired    Environmental and seasonal allergies    Migraine aura without headache    Aortic atherosclerosis    Obesity (BMI 30.0-34.9)    Pain    Weakness   [2]   Current Outpatient Medications:     ascorbic acid, vitamin C, (VITAMIN C) 100 MG tablet, Take 100 mg by mouth once daily., Disp: , Rfl:     aspirin (ECOTRIN) 81 MG EC tablet, Take 81 mg by mouth once daily., Disp: , Rfl:     atorvastatin (LIPITOR) 20 MG tablet, Take 1 tablet (20 mg total) by mouth once daily., Disp: 90 tablet, Rfl: 3    biotin 1 mg Cap, Take 1 capsule by mouth once daily., Disp: , Rfl:     blood sugar diagnostic Strp, Accuchek guide meter. To check BG 3  times daily, to use with insurance preferred meter, e 11.65, Disp: 300 each, Rfl: 3    dulaglutide (TRULICITY) 1.5 mg/0.5 mL pen injector, INJECT 1.5MG (1 PEN) UNDER THE SKIN EVERY 7 DAYS, Disp: 12 pen , Rfl: 3    ergocalciferol (ERGOCALCIFEROL) 50,000 unit Cap, TAKE 1 CAPSULE EVERY 30    DAYS, Disp: 3 capsule, Rfl: 3    insulin glargine U-100, Lantus, (LANTUS SOLOSTAR U-100 INSULIN) 100 unit/mL (3 mL) InPn pen, Inject 36 units at night, Disp: 30 mL, Rfl: 3    lancets Misc, To check BG 3  times daily, to use with insurance preferred meter, e 11.65, Disp: 300 each, Rfl: 3    levothyroxine (SYNTHROID) 25 MCG tablet, TAKE 1 TABLET EVERY MORNING ON EMPTY STOMACH, WATER ONLY, WAIT 30-45 MINS BEFORE EATING, DRINKING OR TAKING OTHER MEDS, Disp: 90 tablet, Rfl: 3    losartan-hydrochlorothiazide 50-12.5 mg (HYZAAR) 50-12.5 mg per tablet, Take 1 tablet by  "mouth once daily., Disp: 90 tablet, Rfl: 3    metFORMIN (GLUCOPHAGE) 1000 MG tablet, Take 1 tablet daily, Disp: , Rfl:     multivitamin capsule, Take 1 capsule by mouth once daily., Disp: , Rfl:     NOVOLOG FLEXPEN U-100 INSULIN 100 unit/mL (3 mL) InPn pen, Inject 8-12 units before meals plus scale 180-230+2, 231-280+4, 281-330+6, 331-380+8, >380+10. Max daily 66 units., Disp: , Rfl:     omega-3s/dha/epa/fish oil/D3 (VITAMIN-D + OMEGA-3 ORAL), Take by mouth., Disp: , Rfl:     pen needle, diabetic (BD AGBI 2ND GEN PEN NEEDLE) 32 gauge x 5/32" Ndle, Use 1x a day with lantus, Disp: 100 each, Rfl: 3    zinc gluconate 50 mg tablet, Take 50 mg by mouth once daily., Disp: , Rfl:     azelastine (ASTELIN) 137 mcg (0.1 %) nasal spray, 1 spray (137 mcg total) by Nasal route 2 (two) times daily., Disp: 30 mL, Rfl: 0    benzonatate (TESSALON) 100 MG capsule, Take 1 capsule (100 mg total) by mouth 3 (three) times daily as needed., Disp: 30 capsule, Rfl: 0    blood-glucose meter kit, Accuchek guide meter. To check BG 3  times daily, to use with insurance preferred meter, e 11.65, Disp: 1 each, Rfl: 0    fluticasone propionate (FLONASE) 50 mcg/actuation nasal spray, 1 spray (50 mcg total) by Each Nostril route once daily., Disp: 9.9 mL, Rfl: 0    "

## 2025-05-14 ENCOUNTER — PATIENT MESSAGE (OUTPATIENT)
Dept: OPTOMETRY | Facility: CLINIC | Age: 75
End: 2025-05-14
Payer: MEDICARE

## 2025-06-24 NOTE — TELEPHONE ENCOUNTER
----- Message from LOVE Vivas,AARTI-C sent at 6/14/2018  4:03 PM CDT -----  I sent her a detailed [facility] message 6/5/18 which she has not read yet.   Can you please call her and relay the info regarding her thyroid to her?  Thanks, Shaniqua    ----- Message -----  From: Lo Mays MD  Sent: 5/31/2018   4:12 PM  To: LOVE Vivas,ANP-C    I would have her see one of us.  Based on +FH of thyroid cancer, she is at risk.  She needs an u/s next year  I would star l4 25 mcg qd   ----- Message -----  From: LOVE Vivas,ANP-C  Sent: 5/31/2018   3:40 PM  To: Lo Mays MD    Previously seen by Clarence 12/2017  TSH slightly high but Free T4 normal.   She is 69y/o   + family hx of thyroid CA - sister  Intermittently c/o hoarseness    Recommendations ?      
Opt out

## 2025-06-25 DIAGNOSIS — E11.42 DIABETIC PERIPHERAL NEUROPATHY ASSOCIATED WITH TYPE 2 DIABETES MELLITUS: Chronic | ICD-10-CM

## 2025-06-25 DIAGNOSIS — E03.8 SUBCLINICAL HYPOTHYROIDISM: ICD-10-CM

## 2025-06-25 RX ORDER — ATORVASTATIN CALCIUM 20 MG/1
20 TABLET, FILM COATED ORAL
Qty: 90 TABLET | Refills: 3 | Status: SHIPPED | OUTPATIENT
Start: 2025-06-25

## 2025-06-25 RX ORDER — LOSARTAN POTASSIUM AND HYDROCHLOROTHIAZIDE 12.5; 5 MG/1; MG/1
1 TABLET ORAL
Qty: 90 TABLET | Refills: 3 | Status: SHIPPED | OUTPATIENT
Start: 2025-06-25

## 2025-08-08 ENCOUNTER — PATIENT MESSAGE (OUTPATIENT)
Dept: INTERNAL MEDICINE | Facility: CLINIC | Age: 75
End: 2025-08-08
Payer: MEDICARE

## 2025-08-12 ENCOUNTER — OFFICE VISIT (OUTPATIENT)
Dept: INTERNAL MEDICINE | Facility: CLINIC | Age: 75
End: 2025-08-12
Payer: MEDICARE

## 2025-08-12 VITALS
HEIGHT: 63 IN | HEART RATE: 74 BPM | SYSTOLIC BLOOD PRESSURE: 128 MMHG | OXYGEN SATURATION: 97 % | DIASTOLIC BLOOD PRESSURE: 70 MMHG | WEIGHT: 181.19 LBS | BODY MASS INDEX: 32.11 KG/M2

## 2025-08-12 DIAGNOSIS — E11.69 HYPERLIPIDEMIA ASSOCIATED WITH TYPE 2 DIABETES MELLITUS: Chronic | ICD-10-CM

## 2025-08-12 DIAGNOSIS — E03.9 HYPOTHYROIDISM, ACQUIRED: Chronic | ICD-10-CM

## 2025-08-12 DIAGNOSIS — E11.42 TYPE 2 DIABETES MELLITUS WITH DIABETIC POLYNEUROPATHY, WITH LONG-TERM CURRENT USE OF INSULIN: Primary | Chronic | ICD-10-CM

## 2025-08-12 DIAGNOSIS — E78.5 HYPERLIPIDEMIA ASSOCIATED WITH TYPE 2 DIABETES MELLITUS: Chronic | ICD-10-CM

## 2025-08-12 DIAGNOSIS — M25.542 ARTHRALGIA OF LEFT HAND: ICD-10-CM

## 2025-08-12 DIAGNOSIS — E11.59 HYPERTENSION ASSOCIATED WITH DIABETES: Chronic | ICD-10-CM

## 2025-08-12 DIAGNOSIS — Z79.4 TYPE 2 DIABETES MELLITUS WITH DIABETIC POLYNEUROPATHY, WITH LONG-TERM CURRENT USE OF INSULIN: Primary | Chronic | ICD-10-CM

## 2025-08-12 DIAGNOSIS — E66.811 OBESITY (BMI 30.0-34.9): ICD-10-CM

## 2025-08-12 DIAGNOSIS — I15.2 HYPERTENSION ASSOCIATED WITH DIABETES: Chronic | ICD-10-CM

## 2025-08-12 PROCEDURE — G2211 COMPLEX E/M VISIT ADD ON: HCPCS | Mod: ,,, | Performed by: NURSE PRACTITIONER

## 2025-08-12 PROCEDURE — 95251 CONT GLUC MNTR ANALYSIS I&R: CPT | Mod: ,,, | Performed by: NURSE PRACTITIONER

## 2025-08-12 PROCEDURE — 99214 OFFICE O/P EST MOD 30 MIN: CPT | Mod: S$PBB,,, | Performed by: NURSE PRACTITIONER

## 2025-08-12 PROCEDURE — 99214 OFFICE O/P EST MOD 30 MIN: CPT | Mod: PBBFAC | Performed by: NURSE PRACTITIONER

## 2025-08-12 PROCEDURE — 99999 PR PBB SHADOW E&M-EST. PATIENT-LVL IV: CPT | Mod: PBBFAC,,, | Performed by: NURSE PRACTITIONER

## 2025-08-12 RX ORDER — DICLOFENAC SODIUM 10 MG/G
2 GEL TOPICAL DAILY
Qty: 20 G | Refills: 2 | Status: SHIPPED | OUTPATIENT
Start: 2025-08-12

## 2025-08-12 RX ORDER — INSULIN GLARGINE 100 [IU]/ML
INJECTION, SOLUTION SUBCUTANEOUS
Start: 2025-08-12

## 2025-08-19 ENCOUNTER — OFFICE VISIT (OUTPATIENT)
Dept: OPTOMETRY | Facility: CLINIC | Age: 75
End: 2025-08-19
Payer: MEDICARE

## 2025-08-19 DIAGNOSIS — E11.36 TYPE 2 DIABETES MELLITUS WITH CATARACT: ICD-10-CM

## 2025-08-19 DIAGNOSIS — H25.13 SENILE NUCLEAR SCLEROSIS, BILATERAL: ICD-10-CM

## 2025-08-19 DIAGNOSIS — H52.4 HYPEROPIA WITH ASTIGMATISM AND PRESBYOPIA, BILATERAL: ICD-10-CM

## 2025-08-19 DIAGNOSIS — H52.03 HYPEROPIA WITH ASTIGMATISM AND PRESBYOPIA, BILATERAL: ICD-10-CM

## 2025-08-19 DIAGNOSIS — E11.9 TYPE 2 DIABETES MELLITUS WITHOUT RETINOPATHY: Primary | ICD-10-CM

## 2025-08-19 DIAGNOSIS — H52.203 HYPEROPIA WITH ASTIGMATISM AND PRESBYOPIA, BILATERAL: ICD-10-CM

## 2025-08-19 PROCEDURE — 99214 OFFICE O/P EST MOD 30 MIN: CPT | Mod: S$PBB,,, | Performed by: OPTOMETRIST

## 2025-08-19 PROCEDURE — 99999 PR PBB SHADOW E&M-EST. PATIENT-LVL III: CPT | Mod: PBBFAC,,, | Performed by: OPTOMETRIST

## 2025-08-19 PROCEDURE — 92015 DETERMINE REFRACTIVE STATE: CPT | Mod: ,,, | Performed by: OPTOMETRIST

## 2025-08-19 PROCEDURE — 99213 OFFICE O/P EST LOW 20 MIN: CPT | Mod: PBBFAC | Performed by: OPTOMETRIST
